# Patient Record
Sex: MALE | Race: WHITE | NOT HISPANIC OR LATINO | Employment: OTHER | URBAN - METROPOLITAN AREA
[De-identification: names, ages, dates, MRNs, and addresses within clinical notes are randomized per-mention and may not be internally consistent; named-entity substitution may affect disease eponyms.]

---

## 2018-11-15 LAB — HCV AB SER-ACNC: NEGATIVE

## 2019-11-20 LAB
CHOLEST SERPL-MSCNC: 86 MG/DL (ref 0–200)
HDLC SERPL-MCNC: 35 MG/DL
LDLC SERPL CALC-MCNC: 34 MG/DL (ref 0–160)
TRIGL SERPL-MCNC: 86 MG/DL

## 2020-11-03 ENCOUNTER — OFFICE VISIT (OUTPATIENT)
Dept: FAMILY MEDICINE | Facility: CLINIC | Age: 70
End: 2020-11-03
Payer: MEDICARE

## 2020-11-03 VITALS
BODY MASS INDEX: 32.74 KG/M2 | DIASTOLIC BLOOD PRESSURE: 74 MMHG | HEIGHT: 73 IN | OXYGEN SATURATION: 97 % | WEIGHT: 247 LBS | HEART RATE: 60 BPM | SYSTOLIC BLOOD PRESSURE: 130 MMHG | RESPIRATION RATE: 18 BRPM | TEMPERATURE: 98 F

## 2020-11-03 DIAGNOSIS — I10 HTN, GOAL BELOW 130/80: ICD-10-CM

## 2020-11-03 DIAGNOSIS — S90.821A BLISTER OF PLANTAR ASPECT OF RIGHT FOOT, INITIAL ENCOUNTER: Primary | ICD-10-CM

## 2020-11-03 PROCEDURE — 99212 PR OFFICE/OUTPT VISIT, EST, LEVL II, 10-19 MIN: ICD-10-PCS | Mod: S$GLB,,, | Performed by: INTERNAL MEDICINE

## 2020-11-03 PROCEDURE — 99212 OFFICE O/P EST SF 10 MIN: CPT | Mod: S$GLB,,, | Performed by: INTERNAL MEDICINE

## 2020-11-03 RX ORDER — LEVOTHYROXINE SODIUM 50 UG/1
TABLET ORAL
COMMUNITY
Start: 2020-08-23 | End: 2020-11-23

## 2020-11-03 RX ORDER — FERROUS SULFATE 325(65) MG
325 TABLET ORAL
COMMUNITY
End: 2022-05-20

## 2020-11-03 RX ORDER — IRBESARTAN 150 MG/1
TABLET ORAL
COMMUNITY
Start: 2020-08-25 | End: 2020-11-23

## 2020-11-03 RX ORDER — PANTOPRAZOLE SODIUM 40 MG/1
TABLET, DELAYED RELEASE ORAL
COMMUNITY
Start: 2020-08-25 | End: 2020-11-23

## 2020-11-03 RX ORDER — ATORVASTATIN CALCIUM 40 MG/1
TABLET, FILM COATED ORAL
COMMUNITY
Start: 2020-08-25 | End: 2020-11-23

## 2020-11-03 RX ORDER — INSULIN DEGLUDEC 200 U/ML
26 INJECTION, SOLUTION SUBCUTANEOUS
COMMUNITY
End: 2020-11-17

## 2020-11-03 RX ORDER — INFLUENZA A VIRUS A/MICHIGAN/45/2015 X-275 (H1N1) ANTIGEN (FORMALDEHYDE INACTIVATED), INFLUENZA A VIRUS A/SINGAPORE/INFIMH-16-0019/2016 IVR-186 (H3N2) ANTIGEN (FORMALDEHYDE INACTIVATED), INFLUENZA B VIRUS B/PHUKET/3073/2013 ANTIGEN (FORMALDEHYDE INACTIVATED), AND INFLUENZA B VIRUS B/MARYLAND/15/2016 BX-69A ANTIGEN (FORMALDEHYDE INACTIVATED) 60; 60; 60; 60 UG/.7ML; UG/.7ML; UG/.7ML; UG/.7ML
INJECTION, SUSPENSION INTRAMUSCULAR
COMMUNITY
Start: 2020-09-17 | End: 2021-03-31

## 2020-11-03 RX ORDER — LORATADINE 10 MG/1
10 TABLET ORAL DAILY
COMMUNITY

## 2020-11-03 RX ORDER — METFORMIN HYDROCHLORIDE 500 MG/1
TABLET, EXTENDED RELEASE ORAL
COMMUNITY
Start: 2020-08-23 | End: 2020-11-23

## 2020-11-03 RX ORDER — AMLODIPINE BESYLATE 5 MG/1
TABLET ORAL
COMMUNITY
Start: 2020-09-15 | End: 2020-12-14

## 2020-11-03 RX ORDER — INSULIN DEGLUDEC 200 U/ML
INJECTION, SOLUTION SUBCUTANEOUS
COMMUNITY
Start: 2020-09-10 | End: 2020-12-01

## 2020-11-03 RX ORDER — GABAPENTIN 300 MG/1
300 CAPSULE ORAL
COMMUNITY
End: 2020-11-30

## 2020-11-03 RX ORDER — CALCIUM CARB/VITAMIN D3/VIT K1 650MG-12.5
1 TABLET,CHEWABLE ORAL
COMMUNITY
End: 2022-05-20

## 2020-11-03 RX ORDER — DAPAGLIFLOZIN AND SAXAGLIPTIN 10; 5 MG/1; MG/1
TABLET, FILM COATED ORAL
COMMUNITY
Start: 2020-09-07 | End: 2020-12-07

## 2020-11-03 NOTE — PROGRESS NOTES
"Subjective:       Patient ID: John Martini is a 70 y.o. male.    Chief Complaint: Follow-up (Blister noted to ball of right foot)    Mr. Martini comes to the office today with a complaint of new:  Blood blister on the Right foot pad between the right 2nd toes area ".  His wife was going to help in trimming his toenails last week and that's when they noticed it.  The area is not painful nor does it appear infected.  There is no surrounding erythema, it is not coming to a punctum, seems to have increased some in size because of positioning, the blood has dissected between the planes and now appears further down on the chronic foot pad callus there.     Follow-up  This is a new problem. The current episode started in the past 7 days. The problem has been gradually worsening. Nothing aggravates the symptoms. He has tried nothing for the symptoms. The treatment provided no relief.     Review of Systems   HENT: Negative for drooling and mouth sores.    Respiratory: Negative for stridor.    Gastrointestinal: Negative for rectal pain.   Genitourinary: Negative for decreased urine volume and enuresis.   Allergic/Immunologic: Negative for frequent infections.   All other systems reviewed and are negative.        Objective:      Physical Exam  Vitals signs and nursing note reviewed.   Musculoskeletal:        Feet:          Assessment:       1. Blister of plantar aspect of right foot, initial encounter    2. HTN, goal below 130/80        Plan:       Pt instructed to wear appropriate shoes and protect the area from trauma or cuts. Watch for signs of infection and contact me asap.  "

## 2020-12-01 ENCOUNTER — OFFICE VISIT (OUTPATIENT)
Dept: FAMILY MEDICINE | Facility: CLINIC | Age: 70
End: 2020-12-01
Payer: MEDICARE

## 2020-12-01 VITALS
DIASTOLIC BLOOD PRESSURE: 76 MMHG | HEART RATE: 64 BPM | SYSTOLIC BLOOD PRESSURE: 136 MMHG | BODY MASS INDEX: 31.83 KG/M2 | TEMPERATURE: 98 F | HEIGHT: 74 IN | WEIGHT: 248 LBS | OXYGEN SATURATION: 97 % | RESPIRATION RATE: 18 BRPM

## 2020-12-01 DIAGNOSIS — I10 ESSENTIAL HYPERTENSION: ICD-10-CM

## 2020-12-01 DIAGNOSIS — E11.621 DIABETIC ULCER OF OTHER PART OF RIGHT FOOT ASSOCIATED WITH TYPE 2 DIABETES MELLITUS, WITH FAT LAYER EXPOSED: Primary | ICD-10-CM

## 2020-12-01 DIAGNOSIS — L97.512 DIABETIC ULCER OF OTHER PART OF RIGHT FOOT ASSOCIATED WITH TYPE 2 DIABETES MELLITUS, WITH FAT LAYER EXPOSED: Primary | ICD-10-CM

## 2020-12-01 PROCEDURE — 99214 OFFICE O/P EST MOD 30 MIN: CPT | Mod: 25,S$GLB,, | Performed by: INTERNAL MEDICINE

## 2020-12-01 PROCEDURE — 90471 TDAP VACCINE GREATER THAN OR EQUAL TO 7YO IM: ICD-10-PCS | Mod: S$GLB,,, | Performed by: INTERNAL MEDICINE

## 2020-12-01 PROCEDURE — 90715 TDAP VACCINE GREATER THAN OR EQUAL TO 7YO IM: ICD-10-PCS | Mod: S$GLB,,, | Performed by: INTERNAL MEDICINE

## 2020-12-01 PROCEDURE — 99214 PR OFFICE/OUTPT VISIT, EST, LEVL IV, 30-39 MIN: ICD-10-PCS | Mod: 25,S$GLB,, | Performed by: INTERNAL MEDICINE

## 2020-12-01 PROCEDURE — 90471 IMMUNIZATION ADMIN: CPT | Mod: S$GLB,,, | Performed by: INTERNAL MEDICINE

## 2020-12-01 PROCEDURE — 90715 TDAP VACCINE 7 YRS/> IM: CPT | Mod: S$GLB,,, | Performed by: INTERNAL MEDICINE

## 2020-12-01 RX ORDER — LEVOTHYROXINE SODIUM 50 UG/1
50 TABLET ORAL
COMMUNITY
Start: 2020-08-23 | End: 2021-10-26

## 2020-12-01 RX ORDER — LOSARTAN POTASSIUM AND HYDROCHLOROTHIAZIDE 12.5; 1 MG/1; MG/1
TABLET ORAL
COMMUNITY
End: 2022-04-27

## 2020-12-01 RX ORDER — BLOOD-GLUCOSE METER
EACH MISCELLANEOUS
COMMUNITY
Start: 2020-11-02 | End: 2021-09-20

## 2020-12-01 RX ORDER — AMOXICILLIN AND CLAVULANATE POTASSIUM 875; 125 MG/1; MG/1
1 TABLET, FILM COATED ORAL 2 TIMES DAILY
Qty: 10 TABLET | Refills: 0 | Status: SHIPPED | OUTPATIENT
Start: 2020-12-01 | End: 2021-03-31

## 2020-12-01 NOTE — PROGRESS NOTES
Subjective:       Patient ID: John Martini is a 70 y.o. male.    Chief Complaint: Follow-up (blister on right foot)    Patient is here today for follow-up of the blister on the front side of his right foot.  He has spontaneously started draining and he has developed a fetid smell they noticed this on on November 29, 2020.  There has been no fever chills nausea vomiting diarrhea or any other constitutional symptoms associated to these.  There is no red streak going up the foot.  There had been no recent steroid therapy nor antibiotic therapy either.  The patient did not try cutting into the lesion is spontaneously started draining.  At the beginning it appeared like a bruise and then he has started setting the callus like skin and he has a slight opening to wait in longitudinal fashion following the long axis of the foot.  Is mildly painful.  It is not affecting his sleep.  My assessment at this time if the patient warrants wound care evaluation and therapy and he agrees to be referred to the approximate care unit in [facility].    His BOOSTRIX will be administered now.    BP is to goal.    Review of Systems   HENT: Negative for drooling and mouth sores. Trouble swallowing: augm.    Respiratory: Negative for stridor.    Gastrointestinal: Negative for rectal pain.   Genitourinary: Negative for decreased urine volume and enuresis.   Allergic/Immunologic: Negative for frequent infections.   All other systems reviewed and are negative.        Objective:      Physical Exam  Constitutional:       Appearance: Normal appearance. He is obese.   HENT:      Head: Normocephalic and atraumatic.      Right Ear: Tympanic membrane, ear canal and external ear normal. There is no impacted cerumen.      Left Ear: Tympanic membrane, ear canal and external ear normal. There is no impacted cerumen.      Nose: No congestion or rhinorrhea.      Mouth/Throat:      Mouth: Mucous membranes are moist.      Pharynx: No  oropharyngeal exudate or posterior oropharyngeal erythema.   Eyes:      General: No scleral icterus.        Right eye: No discharge.         Left eye: No discharge.      Extraocular Movements: Extraocular movements intact.      Conjunctiva/sclera: Conjunctivae normal.      Pupils: Pupils are equal, round, and reactive to light.   Neck:      Musculoskeletal: Normal range of motion and neck supple. No neck rigidity or muscular tenderness.      Vascular: No carotid bruit.   Cardiovascular:      Rate and Rhythm: Normal rate and regular rhythm.      Pulses: Normal pulses.      Heart sounds: Normal heart sounds. No murmur. No friction rub. No gallop.    Pulmonary:      Effort: Pulmonary effort is normal. No respiratory distress.      Breath sounds: Normal breath sounds. No stridor. No wheezing, rhonchi or rales.   Chest:      Chest wall: No tenderness.   Abdominal:      General: Abdomen is flat. Bowel sounds are normal. There is no distension.      Palpations: Abdomen is soft. There is no mass.      Tenderness: There is no abdominal tenderness. There is no right CVA tenderness, left CVA tenderness, guarding or rebound.      Hernia: No hernia is present.   Genitourinary:     Penis: Normal.       Scrotum/Testes: Normal.      Prostate: Normal.      Rectum: Normal. Guaiac result negative.   Musculoskeletal: Normal range of motion.         General: No swelling, tenderness, deformity or signs of injury.      Right lower leg: No edema.      Left lower leg: No edema.        Feet:    Feet:      Comments: Area affected. PLEASE SEE PHOTO OF THE AFFECTED AREA INCLUDED.  Lymphadenopathy:      Cervical: No cervical adenopathy.   Skin:     General: Skin is warm and dry.      Capillary Refill: Capillary refill takes 2 to 3 seconds.      Coloration: Skin is not jaundiced or pale.      Findings: No bruising, erythema, lesion or rash.   Neurological:      General: No focal deficit present.      Mental Status: He is alert and oriented to  person, place, and time. Mental status is at baseline.      Cranial Nerves: No cranial nerve deficit.      Sensory: No sensory deficit.      Motor: No weakness.      Coordination: Coordination normal.      Gait: Gait normal.      Deep Tendon Reflexes: Reflexes normal.   Psychiatric:         Mood and Affect: Mood normal.         Behavior: Behavior normal.         Thought Content: Thought content normal.         Judgment: Judgment normal.         Assessment:       1. 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 Essential hypertension        Plan:   Patient will be referred to the [place] wound care clinic.  A copy of the chart will be available for the review with the medications prescribed etc. But also a picture of the affected area has been included patient should be seen in the next 72 hr.    The patient will receive a dose of TDAP today prior to leaving office.      If there is any questions with regard to his care please feel free to contact me at [phone] thank you

## 2020-12-07 ENCOUNTER — OFFICE VISIT (OUTPATIENT)
Dept: WOUND CARE | Facility: HOSPITAL | Age: 70
End: 2020-12-07
Attending: FAMILY MEDICINE
Payer: MEDICARE

## 2020-12-07 VITALS — SYSTOLIC BLOOD PRESSURE: 145 MMHG | DIASTOLIC BLOOD PRESSURE: 77 MMHG | TEMPERATURE: 98 F | HEART RATE: 58 BPM

## 2020-12-07 DIAGNOSIS — L97.419: Primary | ICD-10-CM

## 2020-12-07 PROCEDURE — 11042 PR DEBRIDEMENT, SKIN, SUB-Q TISSUE,=<20 SQ CM: ICD-10-PCS | Mod: ,,, | Performed by: FAMILY MEDICINE

## 2020-12-07 PROCEDURE — 99203 PR OFFICE/OUTPT VISIT, NEW, LEVL III, 30-44 MIN: ICD-10-PCS | Mod: 25,,, | Performed by: FAMILY MEDICINE

## 2020-12-07 PROCEDURE — 99213 OFFICE O/P EST LOW 20 MIN: CPT | Performed by: FAMILY MEDICINE

## 2020-12-07 PROCEDURE — 11042 DBRDMT SUBQ TIS 1ST 20SQCM/<: CPT | Mod: ,,, | Performed by: FAMILY MEDICINE

## 2020-12-07 PROCEDURE — 99203 OFFICE O/P NEW LOW 30 MIN: CPT | Mod: 25,,, | Performed by: FAMILY MEDICINE

## 2020-12-07 NOTE — PROGRESS NOTES
SUBJECTIVE:    Patient ID: John Martini is a 70 y.o. male.    Chief Complaint: Wound Care    HPI    John Martini is a 70 year old male with diabetes who presents with a complaint of right foot ulcer. Patient states he 1st appreciated the wound on 11/29 however, likely has been there longer.  Current symptoms include thickened skin.  Aggravating factors are ambulation. Symptoms have progressively worsened.Treatment to date have included Neosporin ointment. Patients rates 0/10.    12/7:  Pleasant male presents today for 1st wound care evaluation.  He has a right plantar ulcer that he appreciated November 29th.  The lesion has likely been there longer.  He denies any pain with palpation.  Moderate amount of callus shaved down.  Debridement of skin afterwards for removal of devitalized tissue.  Patient's last A1c per patient about 3 months ago was 8%.  He does have pending A1c.  Had discussion with patient regarding need for offloading of the area, wearing shoes in his home, checking his feet daily, carbohydrate control.  All questions answered.  He is established with Podiatry, last visit about a year ago. Strongly encouraged follow up.  Follow-up 1 week    Past Medical History:   Diagnosis Date    Allergy     Diabetes mellitus, type 2     GERD (gastroesophageal reflux disease)     Hyperlipidemia     Hypertension     Hyperthyroidism      Past Surgical History:   Procedure Laterality Date    COLONOSCOPY      GASTRIC BYPASS N/A      Family History   Problem Relation Age of Onset    Hyperlipidemia Mother     Stroke Mother     Heart disease Father        Marital Status:   Alcohol History:  has no history on file for alcohol.  Tobacco History:  has no history on file for tobacco.  Drug History:  has no history on file for drug.    Review of patient's allergies indicates:  No Known Allergies    Current Outpatient Medications:     amLODIPine (NORVASC) 5 MG tablet, , Disp: , Rfl:      amoxicillin-clavulanate 875-125mg (AUGMENTIN) 875-125 mg per tablet, Take 1 tablet by mouth 2 (two) times daily., Disp: 10 tablet, Rfl: 0    atorvastatin (LIPITOR) 40 MG tablet, TAKE 1 TABLET AT BEDTIME, Disp: 90 tablet, Rfl: 3    calcium-vitamin D3-vitamin K (VIACTIV) 650 mg-12.5 mcg-40 mcg Chew, Take 1 tablet by mouth., Disp: , Rfl:     ferrous sulfate (FEOSOL) 325 mg (65 mg iron) Tab tablet, Take 325 mg by mouth., Disp: , Rfl:     FLUZONE HIGHDOSE QUAD 20-21  mcg/0.7 mL Syrg, PHARMACY ADMINISTERED, Disp: , Rfl:     gabapentin (NEURONTIN) 300 MG capsule, TAKE 1 CAPSULE IN THE MORNING AND 2 CAPSULES AT BEDTIME, Disp: 270 capsule, Rfl: 3    irbesartan (AVAPRO) 150 MG tablet, TAKE 1 TABLET DAILY, Disp: 90 tablet, Rfl: 3    levothyroxine (SYNTHROID) 50 MCG tablet, Take 50 mcg by mouth., Disp: , Rfl:     loratadine (CLARITIN) 10 mg tablet, Take 10 mg by mouth once daily., Disp: , Rfl:     losartan-hydrochlorothiazide 100-12.5 mg (HYZAAR) 100-12.5 mg Tab, losartan 100 mg-hydrochlorothiazide 12.5 mg tablet  Take 1 tablet every day by oral route for 90 days., Disp: , Rfl:     metFORMIN (GLUCOPHAGE-XR) 500 MG ER 24hr tablet, TAKE 3 TABLETS EVERY EVENING, Disp: 270 tablet, Rfl: 3    multivitamin (MULTIPLE VITAMINS DAILY ORAL), Take by mouth., Disp: , Rfl:     ONETOUCH VERIO TEST STRIPS Strp, USE 1 STRIP VIA METER TWICE A DAY AS DIRECTED, Disp: , Rfl:     pantoprazole (PROTONIX) 40 MG tablet, TAKE 1 TABLET DAILY, Disp: 90 tablet, Rfl: 3    QTERN 10-5 mg Tab, , Disp: , Rfl:     TRESIBA FLEXTOUCH U-200 200 unit/mL (3 mL) InPn, INJECT 26 UNITS UNDER THE SKIN AT BEDTIME, Disp: 18 mL, Rfl: 2    Review of Systems   Constitutional: Negative for activity change, appetite change, chills, fatigue and fever.   Respiratory: Negative for shortness of breath.    Cardiovascular: Negative for chest pain.   Skin: Positive for wound.   Psychiatric/Behavioral: Negative for agitation and behavioral problems.        Physical  Exam   Constitutional: He is oriented to person, place, and time and well-developed, well-nourished, and in no distress. No distress.   HENT:   Head: Normocephalic and atraumatic.   Eyes: EOM are normal.   Pulmonary/Chest: Effort normal. No respiratory distress.   Neurological: He is alert and oriented to person, place, and time.   Skin: Lesion noted. He is not diaphoretic.        Psychiatric: Affect and judgment normal.        Assessment:       1. Ulcer of midfoot, right, with unspecified severity         Plan:       Ulcer of midfoot, right, with unspecified severity      Follow up in about 1 week (around 12/14/2020).           Counseling:      I provided patient education verbally regarding:   Patient diagnosis, treatment options, as well as alternatives, risks, and benefits.       Much of the documentation for this visit was completed in the Wound Docs system.  Please see the attached documentation for further details about the patient's care. Scanned under the Media tab.      Kateryna Love MD

## 2020-12-14 ENCOUNTER — OFFICE VISIT (OUTPATIENT)
Dept: WOUND CARE | Facility: HOSPITAL | Age: 70
End: 2020-12-14
Attending: FAMILY MEDICINE
Payer: MEDICARE

## 2020-12-14 VITALS
DIASTOLIC BLOOD PRESSURE: 95 MMHG | TEMPERATURE: 98 F | SYSTOLIC BLOOD PRESSURE: 136 MMHG | HEART RATE: 67 BPM | RESPIRATION RATE: 18 BRPM

## 2020-12-14 DIAGNOSIS — E11.621 DIABETIC ULCER OF OTHER PART OF RIGHT FOOT ASSOCIATED WITH TYPE 2 DIABETES MELLITUS, WITH FAT LAYER EXPOSED: ICD-10-CM

## 2020-12-14 DIAGNOSIS — L97.512 DIABETIC ULCER OF OTHER PART OF RIGHT FOOT ASSOCIATED WITH TYPE 2 DIABETES MELLITUS, WITH FAT LAYER EXPOSED: ICD-10-CM

## 2020-12-14 DIAGNOSIS — L97.419: Primary | ICD-10-CM

## 2020-12-14 PROCEDURE — 11042 PR DEBRIDEMENT, SKIN, SUB-Q TISSUE,=<20 SQ CM: ICD-10-PCS | Mod: ,,, | Performed by: FAMILY MEDICINE

## 2020-12-14 PROCEDURE — 11042 DBRDMT SUBQ TIS 1ST 20SQCM/<: CPT | Performed by: FAMILY MEDICINE

## 2020-12-14 PROCEDURE — 11042 DBRDMT SUBQ TIS 1ST 20SQCM/<: CPT | Mod: ,,, | Performed by: FAMILY MEDICINE

## 2020-12-14 RX ORDER — AMLODIPINE BESYLATE 5 MG/1
TABLET ORAL
Qty: 90 TABLET | Refills: 3 | Status: SHIPPED | OUTPATIENT
Start: 2020-12-14 | End: 2021-11-16

## 2020-12-14 NOTE — PROGRESS NOTES
SUBJECTIVE:    Patient ID: John Martini is a 70 y.o. male.    Chief Complaint: Wound Care    HPI    John Martini is a 70 year old male with diabetes who presents with a complaint of right foot ulcer. Patient states he 1st appreciated the wound on 11/29 however, likely has been there longer.  Current symptoms include thickened skin.  Aggravating factors are ambulation. Symptoms have progressively worsened.Treatment to date have included Neosporin ointment. Patients rates 0/10.     12/7:  Pleasant male presents today for 1st wound care evaluation.  He has a right plantar ulcer that he appreciated November 29th.  The lesion has likely been there longer.  He denies any pain with palpation.  Moderate amount of callus shaved down.  Debridement of skin afterwards for removal of devitalized tissue.  Patient's last A1c per patient about 3 months ago was 8%.  He does have pending A1c.  Had discussion with patient regarding need for offloading of the area, wearing shoes in his home, checking his feet daily, carbohydrate control.  All questions answered.  He is established with Podiatry, last visit about a year ago. Strongly encouraged follow up.  Follow-up 1 week    12/14:  Patient presents for wound care follow-up.  Since last visit he has established with Podiatry.  Will receive shoe inserts and had callus shaved down again.  Today, callus shaved down.  Small area debrided for removal of devitalized tissue.  He will continue follow-up with Podiatry.  Encouraged offloading and wearing shoe inserts as prescribed.  All of his questions were answered today.  He may follow-up as needed.    Past Medical History:   Diagnosis Date    Allergy     Diabetes mellitus, type 2     GERD (gastroesophageal reflux disease)     Hyperlipidemia     Hypertension     Hyperthyroidism      Past Surgical History:   Procedure Laterality Date    COLONOSCOPY      GASTRIC BYPASS N/A      Family History   Problem Relation Age of Onset     Hyperlipidemia Mother     Stroke Mother     Heart disease Father        Marital Status:   Alcohol History:  has no history on file for alcohol.  Tobacco History:  has no history on file for tobacco.  Drug History:  has no history on file for drug.    Review of patient's allergies indicates:  No Known Allergies    Current Outpatient Medications:     amLODIPine (NORVASC) 5 MG tablet, TAKE 1 TABLET DAILY, Disp: 90 tablet, Rfl: 3    amoxicillin-clavulanate 875-125mg (AUGMENTIN) 875-125 mg per tablet, Take 1 tablet by mouth 2 (two) times daily., Disp: 10 tablet, Rfl: 0    atorvastatin (LIPITOR) 40 MG tablet, TAKE 1 TABLET AT BEDTIME, Disp: 90 tablet, Rfl: 3    calcium-vitamin D3-vitamin K (VIACTIV) 650 mg-12.5 mcg-40 mcg Chew, Take 1 tablet by mouth., Disp: , Rfl:     ferrous sulfate (FEOSOL) 325 mg (65 mg iron) Tab tablet, Take 325 mg by mouth., Disp: , Rfl:     FLUZONE HIGHDOSE QUAD 20-21  mcg/0.7 mL Syrg, PHARMACY ADMINISTERED, Disp: , Rfl:     gabapentin (NEURONTIN) 300 MG capsule, TAKE 1 CAPSULE IN THE MORNING AND 2 CAPSULES AT BEDTIME, Disp: 270 capsule, Rfl: 3    irbesartan (AVAPRO) 150 MG tablet, TAKE 1 TABLET DAILY, Disp: 90 tablet, Rfl: 3    levothyroxine (SYNTHROID) 50 MCG tablet, Take 50 mcg by mouth., Disp: , Rfl:     loratadine (CLARITIN) 10 mg tablet, Take 10 mg by mouth once daily., Disp: , Rfl:     losartan-hydrochlorothiazide 100-12.5 mg (HYZAAR) 100-12.5 mg Tab, losartan 100 mg-hydrochlorothiazide 12.5 mg tablet  Take 1 tablet every day by oral route for 90 days., Disp: , Rfl:     metFORMIN (GLUCOPHAGE-XR) 500 MG ER 24hr tablet, TAKE 3 TABLETS EVERY EVENING, Disp: 270 tablet, Rfl: 3    multivitamin (MULTIPLE VITAMINS DAILY ORAL), Take by mouth., Disp: , Rfl:     ONETOUCH VERIO TEST STRIPS Strp, USE 1 STRIP VIA METER TWICE A DAY AS DIRECTED, Disp: , Rfl:     pantoprazole (PROTONIX) 40 MG tablet, TAKE 1 TABLET DAILY, Disp: 90 tablet, Rfl: 3    QTERN 10-5 mg Tab, TAKE 1  TABLET ONCE DAILY, Disp: 90 tablet, Rfl: 3    TRESIBA FLEXTOUCH U-200 200 unit/mL (3 mL) InPn, INJECT 26 UNITS UNDER THE SKIN AT BEDTIME, Disp: 18 mL, Rfl: 2    Review of Systems     Physical Exam   Constitutional: He is oriented to person, place, and time and well-developed, well-nourished, and in no distress. No distress.   HENT:   Head: Normocephalic and atraumatic.   Eyes: EOM are normal.   Pulmonary/Chest: Effort normal. No respiratory distress.   Neurological: He is alert and oriented to person, place, and time.   Skin: Lesion noted. He is not diaphoretic.        Psychiatric: Affect and judgment normal.        Assessment:       1. Ulcer of midfoot, right, with unspecified severity         Plan:       Ulcer of midfoot, right, with unspecified severity      Follow up if symptoms worsen or fail to improve.           Counseling:      I provided patient education verbally regarding:   Patient diagnosis, treatment options, as well as alternatives, risks, and benefits.       Much of the documentation for this visit was completed in the Wound Docs system.  Please see the attached documentation for further details about the patient's care. Scanned under the Media tab.      Kateryna Love MD

## 2020-12-14 NOTE — TELEPHONE ENCOUNTER
Received message to call patient back regarding his diabetic shoes.  Paperwork filled out by Dr. Lawson. Faxed per Dr. Lawson. Patient notified that paperwork had been faxed. Patient verbalized understanding. GILES Ellsworth RN

## 2020-12-14 NOTE — TELEPHONE ENCOUNTER
----- Message from Sherin Strauss, Patient Care Assistant sent at 12/11/2020  1:45 PM CST -----  Regarding: advice  Contact: pt  Type: Needs Medical Advice  Who Called:  pt   Best Call Back Number: [phone] (home) or [phone]  Additional Information: pt states would like a callback regarding his diabetic shoes order. Thanks!

## 2020-12-16 ENCOUNTER — TELEPHONE (OUTPATIENT)
Dept: FAMILY MEDICINE | Facility: CLINIC | Age: 70
End: 2020-12-16

## 2020-12-16 NOTE — TELEPHONE ENCOUNTER
Spoke with Lara in [facility].  States patient was seen 12/7 as well as 12/14.  Informed making sure patient was seen and orders addressed as Basket Message was not addressed.

## 2020-12-16 NOTE — TELEPHONE ENCOUNTER
----- Message from Sha Mcgarry sent at 12/4/2020  8:33 AM CST -----  Contact: Lara from [facility] [phone]  Type: Needs Medical Advice  Who Called:  Lara Ordoñez Call Back Number: [phone]  Additional Information: Lara from [facility] is calling to let you know the pt has been schedule for 12/7/2020 for 9AM. Please call back and advise

## 2020-12-29 ENCOUNTER — APPOINTMENT (OUTPATIENT)
Dept: LAB | Facility: HOSPITAL | Age: 70
End: 2020-12-29
Attending: INTERNAL MEDICINE
Payer: MEDICARE

## 2020-12-29 ENCOUNTER — LAB VISIT (OUTPATIENT)
Dept: FAMILY MEDICINE | Facility: CLINIC | Age: 70
End: 2020-12-29
Payer: MEDICARE

## 2020-12-29 DIAGNOSIS — E03.9 MYXEDEMA HEART DISEASE: ICD-10-CM

## 2020-12-29 DIAGNOSIS — E11.9 DIABETES MELLITUS WITHOUT COMPLICATION: Primary | ICD-10-CM

## 2020-12-29 DIAGNOSIS — I51.9 MYXEDEMA HEART DISEASE: ICD-10-CM

## 2020-12-29 DIAGNOSIS — Z79.899 ENCOUNTER FOR LONG-TERM (CURRENT) USE OF OTHER MEDICATIONS: ICD-10-CM

## 2020-12-29 LAB
ESTIMATED AVG GLUCOSE: 171 MG/DL (ref 68–131)
HBA1C MFR BLD HPLC: 7.6 % (ref 4–5.6)
T4 FREE SERPL-MCNC: 0.85 NG/DL (ref 0.71–1.51)
TSH SERPL DL<=0.005 MIU/L-ACNC: 5.07 UIU/ML (ref 0.4–4)

## 2020-12-29 PROCEDURE — 83036 HEMOGLOBIN GLYCOSYLATED A1C: CPT

## 2020-12-29 PROCEDURE — 84443 ASSAY THYROID STIM HORMONE: CPT

## 2020-12-29 PROCEDURE — 84439 ASSAY OF FREE THYROXINE: CPT

## 2020-12-31 ENCOUNTER — OFFICE VISIT (OUTPATIENT)
Dept: FAMILY MEDICINE | Facility: CLINIC | Age: 70
End: 2020-12-31
Payer: MEDICARE

## 2020-12-31 VITALS
HEART RATE: 65 BPM | TEMPERATURE: 98 F | HEIGHT: 73 IN | OXYGEN SATURATION: 97 % | RESPIRATION RATE: 18 BRPM | WEIGHT: 240 LBS | BODY MASS INDEX: 31.81 KG/M2 | DIASTOLIC BLOOD PRESSURE: 74 MMHG | SYSTOLIC BLOOD PRESSURE: 134 MMHG

## 2020-12-31 DIAGNOSIS — L97.419 DIABETIC ULCER OF RIGHT MIDFOOT ASSOCIATED WITH DIABETES MELLITUS DUE TO UNDERLYING CONDITION, UNSPECIFIED ULCER STAGE: ICD-10-CM

## 2020-12-31 DIAGNOSIS — E08.621 DIABETIC ULCER OF RIGHT MIDFOOT ASSOCIATED WITH DIABETES MELLITUS DUE TO UNDERLYING CONDITION, UNSPECIFIED ULCER STAGE: ICD-10-CM

## 2020-12-31 DIAGNOSIS — E11.9 DIABETES MELLITUS WITHOUT COMPLICATION: Primary | ICD-10-CM

## 2020-12-31 DIAGNOSIS — I10 ESSENTIAL HYPERTENSION: ICD-10-CM

## 2020-12-31 PROCEDURE — 99213 PR OFFICE/OUTPT VISIT, EST, LEVL III, 20-29 MIN: ICD-10-PCS | Mod: S$GLB,,, | Performed by: INTERNAL MEDICINE

## 2020-12-31 PROCEDURE — 99213 OFFICE O/P EST LOW 20 MIN: CPT | Mod: S$GLB,,, | Performed by: INTERNAL MEDICINE

## 2020-12-31 RX ORDER — MUPIROCIN 20 MG/G
OINTMENT TOPICAL
COMMUNITY
Start: 2020-12-11 | End: 2022-05-20

## 2020-12-31 RX ORDER — ASPIRIN 81 MG/1
TABLET ORAL
COMMUNITY
Start: 2020-12-28 | End: 2022-05-20

## 2020-12-31 NOTE — PROGRESS NOTES
Subjective:       Patient ID: John Martini is a 70 y.o. male.    Chief Complaint: Follow-up (one month)    Patient is here for follow-up of care for his right foot diabetic foot ulcer.  He was seen by wound care center in [place] and then has been following up lately with Dr. Blackwood the podiatrist in [facility].  The wound is completely healed as per patient's description.    Glucose has not been to goal, the fasting average is about 130 three and he has not been performing 2 hr postprandial glucose capillary blood glucose measurements.    On laboratory evaluation carried out at this facility on December 29th his TSH is slightly elevated at 5.069 on his hemoglobin A1c was 7.6 which is improved from prior.  This gives in average glucose of 168 over the last 60 days.  Our goal is to have the hemoglobin A1c as low as possible while at the same time avoiding hypoglycemic reactions.    Patient does have testing supplies at home.    On medication review he does not need any refills at this time.  He does mention that the acute turn will have to be changed to the split separate drug classes due to coverage.        Review of Systems   HENT: Negative for drooling and mouth sores.    Respiratory: Negative for stridor.    Gastrointestinal: Negative for rectal pain.   Genitourinary: Negative for decreased urine volume and enuresis.   Allergic/Immunologic: Negative for frequent infections.   All other systems reviewed and are negative.        Objective:      Physical Exam  Vitals signs and nursing note reviewed.   Constitutional:       Appearance: Normal appearance. He is obese.   HENT:      Head: Normocephalic and atraumatic.      Right Ear: Tympanic membrane, ear canal and external ear normal. There is no impacted cerumen.      Left Ear: Tympanic membrane, ear canal and external ear normal. There is no impacted cerumen.      Nose: No congestion or rhinorrhea.      Mouth/Throat:      Mouth: Mucous membranes are moist.       Pharynx: No oropharyngeal exudate or posterior oropharyngeal erythema.   Eyes:      General: No scleral icterus.        Right eye: No discharge.         Left eye: No discharge.      Extraocular Movements: Extraocular movements intact.      Conjunctiva/sclera: Conjunctivae normal.      Pupils: Pupils are equal, round, and reactive to light.   Neck:      Musculoskeletal: Normal range of motion and neck supple. No neck rigidity or muscular tenderness.      Vascular: No carotid bruit.   Cardiovascular:      Rate and Rhythm: Normal rate and regular rhythm.      Pulses: Normal pulses.      Heart sounds: Normal heart sounds. No murmur. No friction rub. No gallop.    Pulmonary:      Effort: Pulmonary effort is normal. No respiratory distress.      Breath sounds: Normal breath sounds. No stridor. No wheezing, rhonchi or rales.   Chest:      Chest wall: No tenderness.   Abdominal:      General: Abdomen is flat. Bowel sounds are normal. There is no distension.      Palpations: Abdomen is soft. There is no mass.      Tenderness: There is no abdominal tenderness. There is no right CVA tenderness, left CVA tenderness, guarding or rebound.      Hernia: No hernia is present.   Genitourinary:     Penis: Normal.       Scrotum/Testes: Normal.      Prostate: Normal.      Rectum: Normal. Guaiac result negative.   Musculoskeletal: Normal range of motion.         General: No swelling, tenderness, deformity or signs of injury.      Right lower leg: No edema.      Left lower leg: No edema.   Lymphadenopathy:      Cervical: No cervical adenopathy.   Skin:     General: Skin is warm and dry.      Capillary Refill: Capillary refill takes 2 to 3 seconds.      Coloration: Skin is not jaundiced or pale.      Findings: No bruising, erythema, lesion or rash.   Neurological:      General: No focal deficit present.      Mental Status: He is alert and oriented to person, place, and time. Mental status is at baseline.      Cranial Nerves: No cranial  nerve deficit.      Sensory: No sensory deficit.      Motor: No weakness.      Coordination: Coordination normal.      Gait: Gait normal.      Deep Tendon Reflexes: Reflexes normal.   Psychiatric:         Mood and Affect: Mood normal.         Behavior: Behavior normal.         Thought Content: Thought content normal.         Judgment: Judgment normal.         Assessment:       1. Diabetes mellitus without complication    2. Essential hypertension    3. Diabetic ulcer of right midfoot associated with diabetes mellitus due to underlying condition, unspecified ulcer stage        Plan:       Plans are for patient to continue same diabetic management with continued decrease in the hemoglobin A1c the closer to a 7-6.8 the better.  He has all the medications and supplies or testing at home.  His diabetic foot ulcer is healed per his description is to continue follow-up with Dr. Blackwood as schedule.  Hemoglobin A1c will be carried out here March 30th with a follow-up appointment March 31, 2021.  Patient is interested in getting the coronavirus vaccine once is available to the general population.  Is well aware of the signs and symptoms to watch for and to seek attention in case these appear

## 2021-01-04 ENCOUNTER — PATIENT MESSAGE (OUTPATIENT)
Dept: ADMINISTRATIVE | Facility: HOSPITAL | Age: 71
End: 2021-01-04

## 2021-01-14 DIAGNOSIS — E11.9 TYPE 2 DIABETES MELLITUS WITHOUT COMPLICATION: ICD-10-CM

## 2021-01-22 ENCOUNTER — PATIENT MESSAGE (OUTPATIENT)
Dept: ADMINISTRATIVE | Facility: OTHER | Age: 71
End: 2021-01-22

## 2021-02-01 ENCOUNTER — OFFICE VISIT (OUTPATIENT)
Dept: FAMILY MEDICINE | Facility: CLINIC | Age: 71
End: 2021-02-01
Payer: MEDICARE

## 2021-02-01 VITALS
HEIGHT: 73 IN | WEIGHT: 246 LBS | TEMPERATURE: 97 F | DIASTOLIC BLOOD PRESSURE: 68 MMHG | SYSTOLIC BLOOD PRESSURE: 120 MMHG | RESPIRATION RATE: 16 BRPM | OXYGEN SATURATION: 97 % | HEART RATE: 55 BPM | BODY MASS INDEX: 32.6 KG/M2

## 2021-02-01 DIAGNOSIS — R00.1 BRADYCARDIA: ICD-10-CM

## 2021-02-01 DIAGNOSIS — E11.9 DIABETES MELLITUS WITHOUT COMPLICATION: Primary | ICD-10-CM

## 2021-02-01 PROCEDURE — 99212 OFFICE O/P EST SF 10 MIN: CPT | Mod: S$GLB,,, | Performed by: INTERNAL MEDICINE

## 2021-02-01 PROCEDURE — 99212 PR OFFICE/OUTPT VISIT, EST, LEVL II, 10-19 MIN: ICD-10-PCS | Mod: S$GLB,,, | Performed by: INTERNAL MEDICINE

## 2021-02-01 RX ORDER — AMMONIUM LACTATE 12 G/100G
LOTION TOPICAL
COMMUNITY
Start: 2021-01-31 | End: 2022-05-20

## 2021-02-01 RX ORDER — EMPAGLIFLOZIN AND LINAGLIPTIN 25; 5 MG/1; MG/1
TABLET, FILM COATED ORAL
Qty: 90 TABLET | Refills: 3 | Status: SHIPPED | OUTPATIENT
Start: 2021-02-01 | End: 2022-01-10

## 2021-02-11 ENCOUNTER — IMMUNIZATION (OUTPATIENT)
Dept: PRIMARY CARE CLINIC | Facility: CLINIC | Age: 71
End: 2021-02-11
Payer: MEDICARE

## 2021-02-11 DIAGNOSIS — Z23 NEED FOR VACCINATION: Primary | ICD-10-CM

## 2021-02-11 PROCEDURE — 91300 COVID-19, MRNA, LNP-S, PF, 30 MCG/0.3 ML DOSE VACCINE: ICD-10-PCS | Mod: S$GLB,,, | Performed by: FAMILY MEDICINE

## 2021-02-11 PROCEDURE — 91300 COVID-19, MRNA, LNP-S, PF, 30 MCG/0.3 ML DOSE VACCINE: CPT | Mod: S$GLB,,, | Performed by: FAMILY MEDICINE

## 2021-02-11 PROCEDURE — 0001A COVID-19, MRNA, LNP-S, PF, 30 MCG/0.3 ML DOSE VACCINE: CPT | Mod: S$GLB,,, | Performed by: FAMILY MEDICINE

## 2021-02-11 PROCEDURE — 0001A COVID-19, MRNA, LNP-S, PF, 30 MCG/0.3 ML DOSE VACCINE: ICD-10-PCS | Mod: S$GLB,,, | Performed by: FAMILY MEDICINE

## 2021-02-18 LAB
LEFT EYE DM RETINOPATHY: POSITIVE
RIGHT EYE DM RETINOPATHY: POSITIVE

## 2021-03-04 ENCOUNTER — IMMUNIZATION (OUTPATIENT)
Dept: PRIMARY CARE CLINIC | Facility: CLINIC | Age: 71
End: 2021-03-04
Payer: MEDICARE

## 2021-03-04 ENCOUNTER — PATIENT OUTREACH (OUTPATIENT)
Dept: ADMINISTRATIVE | Facility: HOSPITAL | Age: 71
End: 2021-03-04

## 2021-03-04 DIAGNOSIS — Z23 NEED FOR VACCINATION: Primary | ICD-10-CM

## 2021-03-04 PROCEDURE — 91300 COVID-19, MRNA, LNP-S, PF, 30 MCG/0.3 ML DOSE VACCINE: ICD-10-PCS | Mod: S$GLB,,, | Performed by: FAMILY MEDICINE

## 2021-03-04 PROCEDURE — 0002A COVID-19, MRNA, LNP-S, PF, 30 MCG/0.3 ML DOSE VACCINE: CPT | Mod: CV19,S$GLB,, | Performed by: FAMILY MEDICINE

## 2021-03-04 PROCEDURE — 0002A COVID-19, MRNA, LNP-S, PF, 30 MCG/0.3 ML DOSE VACCINE: ICD-10-PCS | Mod: CV19,S$GLB,, | Performed by: FAMILY MEDICINE

## 2021-03-04 PROCEDURE — 91300 COVID-19, MRNA, LNP-S, PF, 30 MCG/0.3 ML DOSE VACCINE: CPT | Mod: S$GLB,,, | Performed by: FAMILY MEDICINE

## 2021-03-08 ENCOUNTER — PATIENT OUTREACH (OUTPATIENT)
Dept: ADMINISTRATIVE | Facility: HOSPITAL | Age: 71
End: 2021-03-08

## 2021-03-10 ENCOUNTER — PATIENT OUTREACH (OUTPATIENT)
Dept: ADMINISTRATIVE | Facility: HOSPITAL | Age: 71
End: 2021-03-10

## 2021-03-30 ENCOUNTER — APPOINTMENT (OUTPATIENT)
Dept: LAB | Facility: HOSPITAL | Age: 71
End: 2021-03-30
Attending: INTERNAL MEDICINE
Payer: MEDICARE

## 2021-03-30 ENCOUNTER — LAB VISIT (OUTPATIENT)
Dept: FAMILY MEDICINE | Facility: CLINIC | Age: 71
End: 2021-03-30
Payer: MEDICARE

## 2021-03-30 DIAGNOSIS — E11.9 DIABETES MELLITUS WITHOUT COMPLICATION: Primary | ICD-10-CM

## 2021-03-30 DIAGNOSIS — Z79.899 ENCOUNTER FOR LONG-TERM (CURRENT) USE OF OTHER MEDICATIONS: ICD-10-CM

## 2021-03-30 DIAGNOSIS — E78.00 PURE HYPERCHOLESTEROLEMIA: ICD-10-CM

## 2021-03-30 LAB
CHOLEST SERPL-MCNC: 98 MG/DL (ref 120–199)
CHOLEST/HDLC SERPL: 2 {RATIO} (ref 2–5)
ESTIMATED AVG GLUCOSE: 163 MG/DL (ref 68–131)
HBA1C MFR BLD: 7.3 % (ref 4–5.6)
HDLC SERPL-MCNC: 48 MG/DL (ref 40–75)
HDLC SERPL: 49 % (ref 20–50)
LDLC SERPL CALC-MCNC: 37.6 MG/DL (ref 63–159)
NONHDLC SERPL-MCNC: 50 MG/DL
TRIGL SERPL-MCNC: 62 MG/DL (ref 30–150)

## 2021-03-30 PROCEDURE — 80061 LIPID PANEL: CPT | Performed by: INTERNAL MEDICINE

## 2021-03-30 PROCEDURE — 83036 HEMOGLOBIN GLYCOSYLATED A1C: CPT | Performed by: INTERNAL MEDICINE

## 2021-03-31 ENCOUNTER — OFFICE VISIT (OUTPATIENT)
Dept: FAMILY MEDICINE | Facility: CLINIC | Age: 71
End: 2021-03-31
Payer: MEDICARE

## 2021-03-31 VITALS
HEART RATE: 66 BPM | WEIGHT: 242 LBS | SYSTOLIC BLOOD PRESSURE: 118 MMHG | OXYGEN SATURATION: 96 % | TEMPERATURE: 98 F | DIASTOLIC BLOOD PRESSURE: 76 MMHG | HEIGHT: 74 IN | RESPIRATION RATE: 16 BRPM | BODY MASS INDEX: 31.06 KG/M2

## 2021-03-31 DIAGNOSIS — E78.00 PURE HYPERCHOLESTEROLEMIA: ICD-10-CM

## 2021-03-31 DIAGNOSIS — I10 ESSENTIAL HYPERTENSION: ICD-10-CM

## 2021-03-31 DIAGNOSIS — E11.9 DIABETES MELLITUS WITHOUT COMPLICATION: Primary | ICD-10-CM

## 2021-03-31 PROCEDURE — 99213 PR OFFICE/OUTPT VISIT, EST, LEVL III, 20-29 MIN: ICD-10-PCS | Mod: S$GLB,,, | Performed by: INTERNAL MEDICINE

## 2021-03-31 PROCEDURE — 99213 OFFICE O/P EST LOW 20 MIN: CPT | Mod: S$GLB,,, | Performed by: INTERNAL MEDICINE

## 2021-05-12 DIAGNOSIS — E11.9 TYPE 2 DIABETES MELLITUS WITHOUT COMPLICATION: ICD-10-CM

## 2021-07-07 ENCOUNTER — PATIENT MESSAGE (OUTPATIENT)
Dept: ADMINISTRATIVE | Facility: HOSPITAL | Age: 71
End: 2021-07-07

## 2021-07-13 DIAGNOSIS — Z79.899 ENCOUNTER FOR LONG-TERM (CURRENT) USE OF OTHER MEDICATIONS: ICD-10-CM

## 2021-07-13 DIAGNOSIS — E11.9 DIABETES MELLITUS WITHOUT COMPLICATION: Primary | ICD-10-CM

## 2021-08-03 ENCOUNTER — LAB VISIT (OUTPATIENT)
Dept: FAMILY MEDICINE | Facility: CLINIC | Age: 71
End: 2021-08-03
Payer: MEDICARE

## 2021-08-03 DIAGNOSIS — Z79.899 ENCOUNTER FOR LONG-TERM (CURRENT) USE OF OTHER MEDICATIONS: ICD-10-CM

## 2021-08-03 DIAGNOSIS — E11.9 DIABETES MELLITUS WITHOUT COMPLICATION: Primary | ICD-10-CM

## 2021-08-03 LAB
ANION GAP SERPL CALC-SCNC: 11 MMOL/L (ref 8–16)
BUN SERPL-MCNC: 16 MG/DL (ref 8–23)
CALCIUM SERPL-MCNC: 9.8 MG/DL (ref 8.7–10.5)
CHLORIDE SERPL-SCNC: 105 MMOL/L (ref 95–110)
CO2 SERPL-SCNC: 23 MMOL/L (ref 23–29)
CREAT SERPL-MCNC: 1 MG/DL (ref 0.5–1.4)
EST. GFR  (AFRICAN AMERICAN): >60 ML/MIN/1.73 M^2
EST. GFR  (NON AFRICAN AMERICAN): >60 ML/MIN/1.73 M^2
GLUCOSE SERPL-MCNC: 112 MG/DL (ref 70–110)
POTASSIUM SERPL-SCNC: 3.9 MMOL/L (ref 3.5–5.1)
SODIUM SERPL-SCNC: 139 MMOL/L (ref 136–145)

## 2021-08-03 PROCEDURE — 83036 HEMOGLOBIN GLYCOSYLATED A1C: CPT | Performed by: INTERNAL MEDICINE

## 2021-08-03 PROCEDURE — 82043 UR ALBUMIN QUANTITATIVE: CPT | Performed by: INTERNAL MEDICINE

## 2021-08-03 PROCEDURE — 82570 ASSAY OF URINE CREATININE: CPT | Performed by: INTERNAL MEDICINE

## 2021-08-03 PROCEDURE — 80048 BASIC METABOLIC PNL TOTAL CA: CPT | Performed by: INTERNAL MEDICINE

## 2021-08-04 LAB
ALBUMIN/CREAT UR: 86.2 UG/MG (ref 0–30)
CREAT UR-MCNC: 58 MG/DL (ref 23–375)
ESTIMATED AVG GLUCOSE: 183 MG/DL (ref 68–131)
HBA1C MFR BLD: 8 % (ref 4–5.6)
MICROALBUMIN UR DL<=1MG/L-MCNC: 50 UG/ML

## 2021-08-05 ENCOUNTER — OFFICE VISIT (OUTPATIENT)
Dept: FAMILY MEDICINE | Facility: CLINIC | Age: 71
End: 2021-08-05
Payer: MEDICARE

## 2021-08-05 VITALS
DIASTOLIC BLOOD PRESSURE: 66 MMHG | TEMPERATURE: 98 F | SYSTOLIC BLOOD PRESSURE: 120 MMHG | RESPIRATION RATE: 16 BRPM | HEIGHT: 74 IN | OXYGEN SATURATION: 97 % | BODY MASS INDEX: 31.06 KG/M2 | WEIGHT: 242 LBS | HEART RATE: 57 BPM

## 2021-08-05 DIAGNOSIS — E78.00 PURE HYPERCHOLESTEROLEMIA: ICD-10-CM

## 2021-08-05 DIAGNOSIS — I10 ESSENTIAL HYPERTENSION: ICD-10-CM

## 2021-08-05 DIAGNOSIS — E11.9 DIABETES MELLITUS WITHOUT COMPLICATION: Primary | ICD-10-CM

## 2021-08-05 PROCEDURE — 99213 PR OFFICE/OUTPT VISIT, EST, LEVL III, 20-29 MIN: ICD-10-PCS | Mod: S$GLB,,, | Performed by: INTERNAL MEDICINE

## 2021-08-05 PROCEDURE — 99213 OFFICE O/P EST LOW 20 MIN: CPT | Mod: S$GLB,,, | Performed by: INTERNAL MEDICINE

## 2021-08-06 DIAGNOSIS — E11.9 DIABETES MELLITUS WITHOUT COMPLICATION: Primary | ICD-10-CM

## 2021-08-06 DIAGNOSIS — Z12.5 SPECIAL SCREENING FOR MALIGNANT NEOPLASM OF PROSTATE: ICD-10-CM

## 2021-08-06 DIAGNOSIS — I10 ESSENTIAL HYPERTENSION: ICD-10-CM

## 2021-08-06 DIAGNOSIS — E78.00 PURE HYPERCHOLESTEROLEMIA: ICD-10-CM

## 2021-08-06 DIAGNOSIS — Z79.899 ENCOUNTER FOR LONG-TERM (CURRENT) USE OF OTHER MEDICATIONS: ICD-10-CM

## 2021-08-06 DIAGNOSIS — E03.9 PRIMARY HYPOTHYROIDISM: ICD-10-CM

## 2021-08-06 DIAGNOSIS — D50.9 IRON DEFICIENCY ANEMIA, UNSPECIFIED: ICD-10-CM

## 2021-09-21 ENCOUNTER — LAB VISIT (OUTPATIENT)
Dept: FAMILY MEDICINE | Facility: CLINIC | Age: 71
End: 2021-09-21
Payer: MEDICARE

## 2021-09-21 DIAGNOSIS — E78.00 PURE HYPERCHOLESTEROLEMIA: ICD-10-CM

## 2021-09-21 DIAGNOSIS — E03.9 PRIMARY HYPOTHYROIDISM: ICD-10-CM

## 2021-09-21 DIAGNOSIS — Z12.5 SPECIAL SCREENING FOR MALIGNANT NEOPLASM OF PROSTATE: ICD-10-CM

## 2021-09-21 DIAGNOSIS — E11.9 DIABETES MELLITUS WITHOUT COMPLICATION: ICD-10-CM

## 2021-09-21 DIAGNOSIS — Z79.899 ENCOUNTER FOR LONG-TERM (CURRENT) USE OF OTHER MEDICATIONS: ICD-10-CM

## 2021-09-21 DIAGNOSIS — D50.9 IRON DEFICIENCY ANEMIA, UNSPECIFIED: ICD-10-CM

## 2021-09-21 LAB
ALBUMIN SERPL BCP-MCNC: 3.6 G/DL (ref 3.5–5.2)
ALP SERPL-CCNC: 89 U/L (ref 55–135)
ALT SERPL W/O P-5'-P-CCNC: 30 U/L (ref 10–44)
ANION GAP SERPL CALC-SCNC: 10 MMOL/L (ref 8–16)
AST SERPL-CCNC: 26 U/L (ref 10–40)
BASOPHILS # BLD AUTO: 0.12 K/UL (ref 0–0.2)
BASOPHILS NFR BLD: 1.6 % (ref 0–1.9)
BILIRUB SERPL-MCNC: 0.7 MG/DL (ref 0.1–1)
BUN SERPL-MCNC: 16 MG/DL (ref 8–23)
CALCIUM SERPL-MCNC: 9.3 MG/DL (ref 8.7–10.5)
CHLORIDE SERPL-SCNC: 106 MMOL/L (ref 95–110)
CHOLEST SERPL-MCNC: 97 MG/DL (ref 120–199)
CHOLEST/HDLC SERPL: 2.7 {RATIO} (ref 2–5)
CO2 SERPL-SCNC: 24 MMOL/L (ref 23–29)
COMPLEXED PSA SERPL-MCNC: 0.19 NG/ML (ref 0–4)
CREAT SERPL-MCNC: 1 MG/DL (ref 0.5–1.4)
DIFFERENTIAL METHOD: ABNORMAL
EOSINOPHIL # BLD AUTO: 0.2 K/UL (ref 0–0.5)
EOSINOPHIL NFR BLD: 3.1 % (ref 0–8)
ERYTHROCYTE [DISTWIDTH] IN BLOOD BY AUTOMATED COUNT: 15.4 % (ref 11.5–14.5)
EST. GFR  (AFRICAN AMERICAN): >60 ML/MIN/1.73 M^2
EST. GFR  (NON AFRICAN AMERICAN): >60 ML/MIN/1.73 M^2
GLUCOSE SERPL-MCNC: 106 MG/DL (ref 70–110)
HCT VFR BLD AUTO: 39.4 % (ref 40–54)
HDLC SERPL-MCNC: 36 MG/DL (ref 40–75)
HDLC SERPL: 37.1 % (ref 20–50)
HGB BLD-MCNC: 12.5 G/DL (ref 14–18)
IMM GRANULOCYTES # BLD AUTO: 0.02 K/UL (ref 0–0.04)
IMM GRANULOCYTES NFR BLD AUTO: 0.3 % (ref 0–0.5)
LDLC SERPL CALC-MCNC: 48 MG/DL (ref 63–159)
LYMPHOCYTES # BLD AUTO: 1.8 K/UL (ref 1–4.8)
LYMPHOCYTES NFR BLD: 23.9 % (ref 18–48)
MCH RBC QN AUTO: 26.8 PG (ref 27–31)
MCHC RBC AUTO-ENTMCNC: 31.7 G/DL (ref 32–36)
MCV RBC AUTO: 84 FL (ref 82–98)
MONOCYTES # BLD AUTO: 0.6 K/UL (ref 0.3–1)
MONOCYTES NFR BLD: 8.2 % (ref 4–15)
NEUTROPHILS # BLD AUTO: 4.8 K/UL (ref 1.8–7.7)
NEUTROPHILS NFR BLD: 62.9 % (ref 38–73)
NONHDLC SERPL-MCNC: 61 MG/DL
NRBC BLD-RTO: 0 /100 WBC
PLATELET # BLD AUTO: 257 K/UL (ref 150–450)
PMV BLD AUTO: 12 FL (ref 9.2–12.9)
POTASSIUM SERPL-SCNC: 4 MMOL/L (ref 3.5–5.1)
PROT SERPL-MCNC: 7.1 G/DL (ref 6–8.4)
RBC # BLD AUTO: 4.67 M/UL (ref 4.6–6.2)
SODIUM SERPL-SCNC: 140 MMOL/L (ref 136–145)
T4 FREE SERPL-MCNC: 0.85 NG/DL (ref 0.71–1.51)
TRIGL SERPL-MCNC: 65 MG/DL (ref 30–150)
TSH SERPL DL<=0.005 MIU/L-ACNC: 4.1 UIU/ML (ref 0.4–4)
WBC # BLD AUTO: 7.65 K/UL (ref 3.9–12.7)

## 2021-09-21 PROCEDURE — 84153 ASSAY OF PSA TOTAL: CPT | Performed by: INTERNAL MEDICINE

## 2021-09-21 PROCEDURE — 84443 ASSAY THYROID STIM HORMONE: CPT | Performed by: INTERNAL MEDICINE

## 2021-09-21 PROCEDURE — 80053 COMPREHEN METABOLIC PANEL: CPT | Performed by: INTERNAL MEDICINE

## 2021-09-21 PROCEDURE — 80061 LIPID PANEL: CPT | Performed by: INTERNAL MEDICINE

## 2021-09-21 PROCEDURE — 84439 ASSAY OF FREE THYROXINE: CPT | Performed by: INTERNAL MEDICINE

## 2021-09-21 PROCEDURE — 85025 COMPLETE CBC W/AUTO DIFF WBC: CPT | Performed by: INTERNAL MEDICINE

## 2021-09-21 PROCEDURE — 84466 ASSAY OF TRANSFERRIN: CPT | Performed by: INTERNAL MEDICINE

## 2021-09-22 LAB
IRON SERPL-MCNC: 62 UG/DL (ref 45–160)
SATURATED IRON: 17 % (ref 20–50)
TOTAL IRON BINDING CAPACITY: 360 UG/DL (ref 250–450)
TRANSFERRIN SERPL-MCNC: 243 MG/DL (ref 200–375)

## 2021-09-27 ENCOUNTER — OFFICE VISIT (OUTPATIENT)
Dept: FAMILY MEDICINE | Facility: CLINIC | Age: 71
End: 2021-09-27
Payer: MEDICARE

## 2021-09-27 VITALS
TEMPERATURE: 97 F | HEART RATE: 62 BPM | DIASTOLIC BLOOD PRESSURE: 70 MMHG | WEIGHT: 242 LBS | RESPIRATION RATE: 16 BRPM | SYSTOLIC BLOOD PRESSURE: 116 MMHG | HEIGHT: 74 IN | BODY MASS INDEX: 31.06 KG/M2 | OXYGEN SATURATION: 97 %

## 2021-09-27 DIAGNOSIS — D50.0 IRON DEFICIENCY ANEMIA DUE TO CHRONIC BLOOD LOSS: ICD-10-CM

## 2021-09-27 DIAGNOSIS — Z00.00 ROUTINE GENERAL MEDICAL EXAMINATION AT A HEALTH CARE FACILITY: Primary | ICD-10-CM

## 2021-09-27 DIAGNOSIS — E11.9 DIABETES MELLITUS WITHOUT COMPLICATION: ICD-10-CM

## 2021-09-27 DIAGNOSIS — Z98.84 H/O BARIATRIC SURGERY: ICD-10-CM

## 2021-09-27 DIAGNOSIS — D64.9 ANEMIA, UNSPECIFIED TYPE: ICD-10-CM

## 2021-09-27 DIAGNOSIS — E78.00 HYPERCHOLESTEROLEMIA: ICD-10-CM

## 2021-09-27 DIAGNOSIS — I10 ESSENTIAL HYPERTENSION: ICD-10-CM

## 2021-09-27 DIAGNOSIS — K28.9 ANASTOMOTIC ULCER S/P GASTRIC BYPASS: ICD-10-CM

## 2021-09-27 DIAGNOSIS — R00.1 BRADYCARDIA: ICD-10-CM

## 2021-09-27 DIAGNOSIS — E03.9 PRIMARY HYPOTHYROIDISM: ICD-10-CM

## 2021-09-27 PROCEDURE — 99397 PER PM REEVAL EST PAT 65+ YR: CPT | Mod: S$GLB,,, | Performed by: INTERNAL MEDICINE

## 2021-09-27 PROCEDURE — 99397 PR PREVENTIVE VISIT,EST,65 & OVER: ICD-10-PCS | Mod: S$GLB,,, | Performed by: INTERNAL MEDICINE

## 2021-12-23 ENCOUNTER — OFFICE VISIT (OUTPATIENT)
Dept: FAMILY MEDICINE | Facility: CLINIC | Age: 71
End: 2021-12-23
Payer: MEDICARE

## 2021-12-23 VITALS
TEMPERATURE: 97 F | DIASTOLIC BLOOD PRESSURE: 78 MMHG | OXYGEN SATURATION: 96 % | BODY MASS INDEX: 30.81 KG/M2 | HEART RATE: 68 BPM | WEIGHT: 240 LBS | SYSTOLIC BLOOD PRESSURE: 118 MMHG

## 2021-12-23 DIAGNOSIS — E11.9 DIABETES MELLITUS WITHOUT COMPLICATION: Primary | ICD-10-CM

## 2021-12-23 DIAGNOSIS — E78.00 HYPERCHOLESTEROLEMIA: ICD-10-CM

## 2021-12-23 DIAGNOSIS — E03.9 PRIMARY HYPOTHYROIDISM: ICD-10-CM

## 2021-12-23 PROCEDURE — 99213 OFFICE O/P EST LOW 20 MIN: CPT | Mod: S$GLB,,, | Performed by: INTERNAL MEDICINE

## 2021-12-23 PROCEDURE — 99213 PR OFFICE/OUTPT VISIT, EST, LEVL III, 20-29 MIN: ICD-10-PCS | Mod: S$GLB,,, | Performed by: INTERNAL MEDICINE

## 2021-12-23 RX ORDER — LEVOTHYROXINE SODIUM 137 UG/1
TABLET ORAL
Qty: 24 TABLET | Refills: 3 | Status: SHIPPED | OUTPATIENT
Start: 2021-12-23 | End: 2022-11-07

## 2021-12-23 RX ORDER — INSULIN DEGLUDEC 200 U/ML
INJECTION, SOLUTION SUBCUTANEOUS
Qty: 6 PEN | Refills: 6 | Status: SHIPPED | OUTPATIENT
Start: 2021-12-23 | End: 2022-01-03

## 2021-12-23 RX ORDER — INSULIN DEGLUDEC 200 U/ML
INJECTION, SOLUTION SUBCUTANEOUS
Status: CANCELLED | OUTPATIENT
Start: 2021-12-23

## 2022-02-04 ENCOUNTER — PATIENT OUTREACH (OUTPATIENT)
Dept: ADMINISTRATIVE | Facility: HOSPITAL | Age: 72
End: 2022-02-04
Payer: MEDICARE

## 2022-02-04 LAB
LEFT EYE DM RETINOPATHY: NEGATIVE
RIGHT EYE DM RETINOPATHY: NEGATIVE

## 2022-04-26 ENCOUNTER — LAB VISIT (OUTPATIENT)
Dept: FAMILY MEDICINE | Facility: CLINIC | Age: 72
End: 2022-04-26
Payer: MEDICARE

## 2022-04-26 DIAGNOSIS — E03.9 PRIMARY HYPOTHYROIDISM: ICD-10-CM

## 2022-04-26 DIAGNOSIS — E11.9 DIABETES MELLITUS WITHOUT COMPLICATION: ICD-10-CM

## 2022-04-26 LAB
T4 FREE SERPL-MCNC: 0.87 NG/DL (ref 0.71–1.51)
TSH SERPL DL<=0.005 MIU/L-ACNC: 3.51 UIU/ML (ref 0.4–4)

## 2022-04-26 PROCEDURE — 84443 ASSAY THYROID STIM HORMONE: CPT | Performed by: INTERNAL MEDICINE

## 2022-04-26 PROCEDURE — 84439 ASSAY OF FREE THYROXINE: CPT | Performed by: INTERNAL MEDICINE

## 2022-04-26 PROCEDURE — 83036 HEMOGLOBIN GLYCOSYLATED A1C: CPT | Performed by: INTERNAL MEDICINE

## 2022-04-27 ENCOUNTER — OFFICE VISIT (OUTPATIENT)
Dept: FAMILY MEDICINE | Facility: CLINIC | Age: 72
End: 2022-04-27
Payer: MEDICARE

## 2022-04-27 VITALS
DIASTOLIC BLOOD PRESSURE: 78 MMHG | WEIGHT: 242 LBS | HEART RATE: 60 BPM | SYSTOLIC BLOOD PRESSURE: 128 MMHG | TEMPERATURE: 98 F | HEIGHT: 74 IN | BODY MASS INDEX: 31.06 KG/M2 | OXYGEN SATURATION: 96 %

## 2022-04-27 DIAGNOSIS — E03.9 PRIMARY HYPOTHYROIDISM: Primary | ICD-10-CM

## 2022-04-27 DIAGNOSIS — E78.00 HYPERCHOLESTEROLEMIA: ICD-10-CM

## 2022-04-27 DIAGNOSIS — Z98.84 H/O BARIATRIC SURGERY: ICD-10-CM

## 2022-04-27 DIAGNOSIS — R00.1 BRADYCARDIA: ICD-10-CM

## 2022-04-27 DIAGNOSIS — E11.9 DIABETES MELLITUS WITHOUT COMPLICATION: ICD-10-CM

## 2022-04-27 LAB
ESTIMATED AVG GLUCOSE: 203 MG/DL (ref 68–131)
HBA1C MFR BLD: 8.7 % (ref 4–5.6)

## 2022-04-27 PROCEDURE — 3078F DIAST BP <80 MM HG: CPT | Mod: CPTII,S$GLB,, | Performed by: INTERNAL MEDICINE

## 2022-04-27 PROCEDURE — 1126F PR PAIN SEVERITY QUANTIFIED, NO PAIN PRESENT: ICD-10-PCS | Mod: CPTII,S$GLB,, | Performed by: INTERNAL MEDICINE

## 2022-04-27 PROCEDURE — 3288F FALL RISK ASSESSMENT DOCD: CPT | Mod: CPTII,S$GLB,, | Performed by: INTERNAL MEDICINE

## 2022-04-27 PROCEDURE — 3008F PR BODY MASS INDEX (BMI) DOCUMENTED: ICD-10-PCS | Mod: CPTII,S$GLB,, | Performed by: INTERNAL MEDICINE

## 2022-04-27 PROCEDURE — 1101F PR PT FALLS ASSESS DOC 0-1 FALLS W/OUT INJ PAST YR: ICD-10-PCS | Mod: CPTII,S$GLB,, | Performed by: INTERNAL MEDICINE

## 2022-04-27 PROCEDURE — 1159F MED LIST DOCD IN RCRD: CPT | Mod: CPTII,S$GLB,, | Performed by: INTERNAL MEDICINE

## 2022-04-27 PROCEDURE — 3052F HG A1C>EQUAL 8.0%<EQUAL 9.0%: CPT | Mod: CPTII,S$GLB,, | Performed by: INTERNAL MEDICINE

## 2022-04-27 PROCEDURE — 3288F PR FALLS RISK ASSESSMENT DOCUMENTED: ICD-10-PCS | Mod: CPTII,S$GLB,, | Performed by: INTERNAL MEDICINE

## 2022-04-27 PROCEDURE — 4010F ACE/ARB THERAPY RXD/TAKEN: CPT | Mod: CPTII,S$GLB,, | Performed by: INTERNAL MEDICINE

## 2022-04-27 PROCEDURE — 1159F PR MEDICATION LIST DOCUMENTED IN MEDICAL RECORD: ICD-10-PCS | Mod: CPTII,S$GLB,, | Performed by: INTERNAL MEDICINE

## 2022-04-27 PROCEDURE — 1126F AMNT PAIN NOTED NONE PRSNT: CPT | Mod: CPTII,S$GLB,, | Performed by: INTERNAL MEDICINE

## 2022-04-27 PROCEDURE — 99214 OFFICE O/P EST MOD 30 MIN: CPT | Mod: S$GLB,,, | Performed by: INTERNAL MEDICINE

## 2022-04-27 PROCEDURE — 3078F PR MOST RECENT DIASTOLIC BLOOD PRESSURE < 80 MM HG: ICD-10-PCS | Mod: CPTII,S$GLB,, | Performed by: INTERNAL MEDICINE

## 2022-04-27 PROCEDURE — 3008F BODY MASS INDEX DOCD: CPT | Mod: CPTII,S$GLB,, | Performed by: INTERNAL MEDICINE

## 2022-04-27 PROCEDURE — 3074F PR MOST RECENT SYSTOLIC BLOOD PRESSURE < 130 MM HG: ICD-10-PCS | Mod: CPTII,S$GLB,, | Performed by: INTERNAL MEDICINE

## 2022-04-27 PROCEDURE — 3052F PR MOST RECENT HEMOGLOBIN A1C LEVEL 8.0 - < 9.0%: ICD-10-PCS | Mod: CPTII,S$GLB,, | Performed by: INTERNAL MEDICINE

## 2022-04-27 PROCEDURE — 99214 PR OFFICE/OUTPT VISIT, EST, LEVL IV, 30-39 MIN: ICD-10-PCS | Mod: S$GLB,,, | Performed by: INTERNAL MEDICINE

## 2022-04-27 PROCEDURE — 4010F PR ACE/ARB THEARPY RXD/TAKEN: ICD-10-PCS | Mod: CPTII,S$GLB,, | Performed by: INTERNAL MEDICINE

## 2022-04-27 PROCEDURE — 1101F PT FALLS ASSESS-DOCD LE1/YR: CPT | Mod: CPTII,S$GLB,, | Performed by: INTERNAL MEDICINE

## 2022-04-27 PROCEDURE — 3074F SYST BP LT 130 MM HG: CPT | Mod: CPTII,S$GLB,, | Performed by: INTERNAL MEDICINE

## 2022-04-27 NOTE — PROGRESS NOTES
Subjective:       Patient ID: John Martini is a 72 y.o. male.    Chief Complaint: Follow-up (Patient is here to review and discuss lab results from bloodwork drawn on 4/26/2022.)    Protective Sensation (w/ 10 gram monofilament):  Right: Decreased  Left: Absent    Visual Inspection:  Onychomycosis -  Bilateral    Pedal Pulses:   Right: Present  Left: Present    Posterior tibialis:   Right:Present  Left: Present    Presents today for follow-up of his lab work carried out at this facility on April 26, 2022. This included diabetic follow-up on thyroid function testing.  Reports are available at time of exam will be discussed with the patient at length.    With regards to his diabetic control hemoglobin A1c returned at 8.7 giving him an estimated glucose over the last 60 days of 203. Needless to say this is over the goal of a hemoglobin A1c equal to less than 7.5 so patient is to follow a diet better on for therapeutic lifestyle changes and repeat hemoglobin A1c will be done in 90 days.  Specifically the lab will be carried out at this facility July 12, 2022 with follow-up appointment.  He is to increase his Tresiba to 32 units subcutaneously at night.  Continue the Glyxambi 5-251 every day and metformin 500 mg extended release 3 every evening    With regards to the care gaps the foot examination was just carried out as above and he has already been notified by his gastroenterologist Dr. Vides that is due his follow-up colonoscopy.  Patient is to call to arrange for same.    On the rest of the lab work thyroid function testing is unremarkable with a TSH of 3.51 and a free T4 0.87.  He is taking levothyroxine 137 mcg 2 tablets every Sunday.  Lab work can be repeated in a year's time.    Medication list has been reviewed and there is no need for refills except for the test strips for his capillary blood glucose equipment.  This prescription is to be issued today.    Patient is compliant to his atorvastatin  without any signs of statin myalgia.    Patient is up-to-date with his eye examination with Dr. Montelongo    Review of Systems   All other systems reviewed and are negative.        Objective:      Physical Exam  Vitals and nursing note reviewed.   Constitutional:       General: He is not in acute distress.     Appearance: Normal appearance. He is obese. He is not ill-appearing, toxic-appearing or diaphoretic.   HENT:      Head: Normocephalic and atraumatic.   Cardiovascular:      Rate and Rhythm: Normal rate and regular rhythm.      Pulses: Normal pulses.      Heart sounds: Normal heart sounds. No murmur heard.  Pulmonary:      Effort: Pulmonary effort is normal. Respiratory distress:         Breath sounds: Normal breath sounds.   Musculoskeletal:      Right lower leg: No edema.      Left lower leg: No edema.   Skin:     General: Skin is warm and dry.      Capillary Refill: Capillary refill takes 2 to 3 seconds.      Coloration: Skin is not jaundiced or pale.   Neurological:      Mental Status: He is alert and oriented to person, place, and time. Mental status is at baseline.      Cranial Nerves: No cranial nerve deficit.      Sensory: No sensory deficit.      Motor: No weakness.      Coordination: Coordination normal.      Gait: Gait normal.   Psychiatric:         Mood and Affect: Mood normal.         Behavior: Behavior normal.         Thought Content: Thought content normal.         Judgment: Judgment normal.         Assessment:       Problem List Items Addressed This Visit    None     Visit Diagnoses     Primary hypothyroidism    -  Primary    Diabetes mellitus without complication        Hypercholesterolemia        Bradycardia        H/O bariatric surgery              Plan:       Patient's diabetes is not controlled as expected with a goal of hemoglobin A1c equal to or less than 7.5.  The Tresiba will be increased to 32 units subcutaneously at night and the Glyxambi and metformin are to be continued as now.   Hemoglobin A1c scheduled for July 12, 2022 with a follow-up appointment.    Foot examination was carried out today.    Patient is up-to-date with his eye examination.    Hypothyroidism is appropriately supplemented with the levothyroxine 137 mcg 2 tablets every Sunday.  This lab work can be repeated in a year's time.    Patient is usually bradycardic and today his of 60 per which is in his usual range and definitely not symptomatic.    He is up-to-date with the microalbumin screen.    Medication list was reviewed and there is no need for any refills except for his testing strips which will be issued today.    Patient is to be scheduled for his colonoscopy for colorectal cancer screening which was due as of April 16, 2022. He sees Dr. Vides     Blood pressure is well controlled with current therapy with the Avapro 150 mg every day.    As it pertains to the hypercholesterolemia his LDL was 48 when last tested September of 2021 and will be repeated

## 2022-05-06 ENCOUNTER — PATIENT OUTREACH (OUTPATIENT)
Dept: ADMINISTRATIVE | Facility: HOSPITAL | Age: 72
End: 2022-05-06
Payer: MEDICARE

## 2022-05-17 ENCOUNTER — TELEPHONE (OUTPATIENT)
Dept: FAMILY MEDICINE | Facility: CLINIC | Age: 72
End: 2022-05-17
Payer: MEDICARE

## 2022-05-17 DIAGNOSIS — E11.9 DIABETES MELLITUS WITHOUT COMPLICATION: Primary | ICD-10-CM

## 2022-05-17 RX ORDER — PEN NEEDLE, DIABETIC 30 GX3/16"
NEEDLE, DISPOSABLE MISCELLANEOUS
Qty: 100 EACH | Refills: 3 | Status: SHIPPED | OUTPATIENT
Start: 2022-05-17

## 2022-05-20 ENCOUNTER — HOSPITAL ENCOUNTER (OUTPATIENT)
Facility: HOSPITAL | Age: 72
Discharge: HOME HEALTH | End: 2022-05-25
Attending: EMERGENCY MEDICINE | Admitting: FAMILY MEDICINE
Payer: MEDICARE

## 2022-05-20 DIAGNOSIS — R31.9 HEMATURIA, UNSPECIFIED TYPE: ICD-10-CM

## 2022-05-20 DIAGNOSIS — D64.9 ANEMIA, UNSPECIFIED TYPE: ICD-10-CM

## 2022-05-20 DIAGNOSIS — S30.1XXA CONTUSION OF ABDOMINAL WALL, INITIAL ENCOUNTER: ICD-10-CM

## 2022-05-20 DIAGNOSIS — E87.6 HYPOKALEMIA: ICD-10-CM

## 2022-05-20 DIAGNOSIS — R53.1 WEAKNESS: Primary | ICD-10-CM

## 2022-05-20 DIAGNOSIS — R07.9 CHEST PAIN: ICD-10-CM

## 2022-05-20 PROBLEM — E86.0 DEHYDRATION: Status: ACTIVE | Noted: 2022-05-20

## 2022-05-20 PROBLEM — R53.81 PHYSICAL DECONDITIONING: Status: ACTIVE | Noted: 2022-05-20

## 2022-05-20 PROBLEM — K59.1 FUNCTIONAL DIARRHEA: Status: ACTIVE | Noted: 2022-05-20

## 2022-05-20 PROBLEM — E11.9 DM2 (DIABETES MELLITUS, TYPE 2): Status: ACTIVE | Noted: 2022-05-20

## 2022-05-20 PROBLEM — E05.90 HYPERTHYROIDISM: Status: ACTIVE | Noted: 2022-05-20

## 2022-05-20 PROBLEM — K21.9 GERD (GASTROESOPHAGEAL REFLUX DISEASE): Status: ACTIVE | Noted: 2022-05-20

## 2022-05-20 PROBLEM — I10 HTN (HYPERTENSION): Status: ACTIVE | Noted: 2022-05-20

## 2022-05-20 LAB
ALBUMIN SERPL BCP-MCNC: 3.4 G/DL (ref 3.5–5.2)
ALP SERPL-CCNC: 57 U/L (ref 55–135)
ALT SERPL W/O P-5'-P-CCNC: 23 U/L (ref 10–44)
ANION GAP SERPL CALC-SCNC: 11 MMOL/L (ref 8–16)
ANION GAP SERPL CALC-SCNC: 13 MMOL/L (ref 8–16)
AST SERPL-CCNC: 25 U/L (ref 10–40)
BACTERIA #/AREA URNS HPF: NEGATIVE /HPF
BASOPHILS # BLD AUTO: 0.11 K/UL (ref 0–0.2)
BASOPHILS NFR BLD: 1.2 % (ref 0–1.9)
BILIRUB SERPL-MCNC: 1 MG/DL (ref 0.1–1)
BILIRUB UR QL STRIP: NEGATIVE
BNP SERPL-MCNC: 87 PG/ML (ref 0–99)
BUN SERPL-MCNC: 9 MG/DL (ref 8–23)
BUN SERPL-MCNC: 9 MG/DL (ref 8–23)
CALCIUM SERPL-MCNC: 8 MG/DL (ref 8.7–10.5)
CALCIUM SERPL-MCNC: 8.2 MG/DL (ref 8.7–10.5)
CHLORIDE SERPL-SCNC: 96 MMOL/L (ref 95–110)
CHLORIDE SERPL-SCNC: 97 MMOL/L (ref 95–110)
CLARITY UR: CLEAR
CO2 SERPL-SCNC: 22 MMOL/L (ref 23–29)
CO2 SERPL-SCNC: 24 MMOL/L (ref 23–29)
COLOR UR: YELLOW
CREAT SERPL-MCNC: 0.9 MG/DL (ref 0.5–1.4)
CREAT SERPL-MCNC: 1 MG/DL (ref 0.5–1.4)
DIFFERENTIAL METHOD: ABNORMAL
EOSINOPHIL # BLD AUTO: 0 K/UL (ref 0–0.5)
EOSINOPHIL NFR BLD: 0.3 % (ref 0–8)
ERYTHROCYTE [DISTWIDTH] IN BLOOD BY AUTOMATED COUNT: 14.6 % (ref 11.5–14.5)
EST. GFR  (AFRICAN AMERICAN): >60 ML/MIN/1.73 M^2
EST. GFR  (AFRICAN AMERICAN): >60 ML/MIN/1.73 M^2
EST. GFR  (NON AFRICAN AMERICAN): >60 ML/MIN/1.73 M^2
EST. GFR  (NON AFRICAN AMERICAN): >60 ML/MIN/1.73 M^2
GLUCOSE SERPL-MCNC: 100 MG/DL (ref 70–110)
GLUCOSE SERPL-MCNC: 105 MG/DL (ref 70–110)
GLUCOSE SERPL-MCNC: 77 MG/DL (ref 70–110)
GLUCOSE SERPL-MCNC: 86 MG/DL (ref 70–110)
GLUCOSE UR QL STRIP: ABNORMAL
HCT VFR BLD AUTO: 38 % (ref 40–54)
HGB BLD-MCNC: 12.4 G/DL (ref 14–18)
HGB UR QL STRIP: ABNORMAL
HYALINE CASTS #/AREA URNS LPF: 3 /LPF
IMM GRANULOCYTES # BLD AUTO: 0.1 K/UL (ref 0–0.04)
IMM GRANULOCYTES NFR BLD AUTO: 1.1 % (ref 0–0.5)
INFLUENZA A, MOLECULAR: NEGATIVE
INFLUENZA B, MOLECULAR: NEGATIVE
INR PPP: 1.1
KETONES UR QL STRIP: ABNORMAL
LACTATE SERPL-SCNC: 0.8 MMOL/L (ref 0.5–1.9)
LEUKOCYTE ESTERASE UR QL STRIP: NEGATIVE
LYMPHOCYTES # BLD AUTO: 1.6 K/UL (ref 1–4.8)
LYMPHOCYTES NFR BLD: 17.3 % (ref 18–48)
MAGNESIUM SERPL-MCNC: 1.8 MG/DL (ref 1.6–2.6)
MCH RBC QN AUTO: 26.3 PG (ref 27–31)
MCHC RBC AUTO-ENTMCNC: 32.6 G/DL (ref 32–36)
MCV RBC AUTO: 81 FL (ref 82–98)
MICROSCOPIC COMMENT: ABNORMAL
MONOCYTES # BLD AUTO: 0.9 K/UL (ref 0.3–1)
MONOCYTES NFR BLD: 9.6 % (ref 4–15)
NEUTROPHILS # BLD AUTO: 6.5 K/UL (ref 1.8–7.7)
NEUTROPHILS NFR BLD: 70.5 % (ref 38–73)
NITRITE UR QL STRIP: NEGATIVE
NRBC BLD-RTO: 0 /100 WBC
PH UR STRIP: 7 [PH] (ref 5–8)
PLATELET # BLD AUTO: 286 K/UL (ref 150–450)
PLATELET BLD QL SMEAR: ABNORMAL
PMV BLD AUTO: 11.6 FL (ref 9.2–12.9)
POTASSIUM SERPL-SCNC: 2.8 MMOL/L (ref 3.5–5.1)
POTASSIUM SERPL-SCNC: 2.9 MMOL/L (ref 3.5–5.1)
PROCALCITONIN SERPL IA-MCNC: <0.05 NG/ML (ref 0–0.5)
PROT SERPL-MCNC: 6.8 G/DL (ref 6–8.4)
PROT UR QL STRIP: ABNORMAL
PROTHROMBIN TIME: 13.8 SEC (ref 11.4–13.7)
RBC # BLD AUTO: 4.71 M/UL (ref 4.6–6.2)
RBC #/AREA URNS HPF: 28 /HPF (ref 0–4)
SARS-COV-2 RDRP RESP QL NAA+PROBE: NEGATIVE
SODIUM SERPL-SCNC: 131 MMOL/L (ref 136–145)
SODIUM SERPL-SCNC: 132 MMOL/L (ref 136–145)
SP GR UR STRIP: 1.02 (ref 1–1.03)
SPECIMEN SOURCE: NORMAL
SQUAMOUS #/AREA URNS HPF: 2 /HPF
TROPONIN I SERPL DL<=0.01 NG/ML-MCNC: 0.03 NG/ML
TROPONIN I SERPL DL<=0.01 NG/ML-MCNC: 0.03 NG/ML
TROPONIN I SERPL DL<=0.01 NG/ML-MCNC: <0.03 NG/ML
TSH SERPL DL<=0.005 MIU/L-ACNC: 2.15 UIU/ML (ref 0.34–5.6)
URN SPEC COLLECT METH UR: ABNORMAL
UROBILINOGEN UR STRIP-ACNC: NEGATIVE EU/DL
WBC # BLD AUTO: 9.17 K/UL (ref 3.9–12.7)
WBC #/AREA URNS HPF: 2 /HPF (ref 0–5)
YEAST URNS QL MICRO: ABNORMAL

## 2022-05-20 PROCEDURE — 87040 BLOOD CULTURE FOR BACTERIA: CPT | Performed by: EMERGENCY MEDICINE

## 2022-05-20 PROCEDURE — 99285 EMERGENCY DEPT VISIT HI MDM: CPT | Mod: 25

## 2022-05-20 PROCEDURE — G0378 HOSPITAL OBSERVATION PER HR: HCPCS

## 2022-05-20 PROCEDURE — 83605 ASSAY OF LACTIC ACID: CPT | Performed by: EMERGENCY MEDICINE

## 2022-05-20 PROCEDURE — 25000003 PHARM REV CODE 250: Performed by: FAMILY MEDICINE

## 2022-05-20 PROCEDURE — 36415 COLL VENOUS BLD VENIPUNCTURE: CPT | Performed by: FAMILY MEDICINE

## 2022-05-20 PROCEDURE — 63600175 PHARM REV CODE 636 W HCPCS: Performed by: FAMILY MEDICINE

## 2022-05-20 PROCEDURE — 83036 HEMOGLOBIN GLYCOSYLATED A1C: CPT | Performed by: FAMILY MEDICINE

## 2022-05-20 PROCEDURE — 84484 ASSAY OF TROPONIN QUANT: CPT | Mod: 91 | Performed by: FAMILY MEDICINE

## 2022-05-20 PROCEDURE — 80053 COMPREHEN METABOLIC PANEL: CPT | Performed by: EMERGENCY MEDICINE

## 2022-05-20 PROCEDURE — 85610 PROTHROMBIN TIME: CPT | Performed by: EMERGENCY MEDICINE

## 2022-05-20 PROCEDURE — 93010 EKG 12-LEAD: ICD-10-PCS | Mod: ,,, | Performed by: SPECIALIST

## 2022-05-20 PROCEDURE — 93010 ELECTROCARDIOGRAM REPORT: CPT | Mod: ,,, | Performed by: SPECIALIST

## 2022-05-20 PROCEDURE — 85025 COMPLETE CBC W/AUTO DIFF WBC: CPT | Performed by: EMERGENCY MEDICINE

## 2022-05-20 PROCEDURE — 83735 ASSAY OF MAGNESIUM: CPT | Performed by: EMERGENCY MEDICINE

## 2022-05-20 PROCEDURE — 25000003 PHARM REV CODE 250: Performed by: EMERGENCY MEDICINE

## 2022-05-20 PROCEDURE — 94799 UNLISTED PULMONARY SVC/PX: CPT

## 2022-05-20 PROCEDURE — 93005 ELECTROCARDIOGRAM TRACING: CPT | Performed by: SPECIALIST

## 2022-05-20 PROCEDURE — 84145 PROCALCITONIN (PCT): CPT | Mod: 91 | Performed by: EMERGENCY MEDICINE

## 2022-05-20 PROCEDURE — 84484 ASSAY OF TROPONIN QUANT: CPT | Mod: 91 | Performed by: EMERGENCY MEDICINE

## 2022-05-20 PROCEDURE — 96375 TX/PRO/DX INJ NEW DRUG ADDON: CPT

## 2022-05-20 PROCEDURE — 63600175 PHARM REV CODE 636 W HCPCS: Performed by: EMERGENCY MEDICINE

## 2022-05-20 PROCEDURE — 83880 ASSAY OF NATRIURETIC PEPTIDE: CPT | Performed by: EMERGENCY MEDICINE

## 2022-05-20 PROCEDURE — 96372 THER/PROPH/DIAG INJ SC/IM: CPT | Performed by: FAMILY MEDICINE

## 2022-05-20 PROCEDURE — U0002 COVID-19 LAB TEST NON-CDC: HCPCS | Performed by: EMERGENCY MEDICINE

## 2022-05-20 PROCEDURE — 99900031 HC PATIENT EDUCATION (STAT)

## 2022-05-20 PROCEDURE — 96366 THER/PROPH/DIAG IV INF ADDON: CPT

## 2022-05-20 PROCEDURE — 84443 ASSAY THYROID STIM HORMONE: CPT | Performed by: EMERGENCY MEDICINE

## 2022-05-20 PROCEDURE — 96365 THER/PROPH/DIAG IV INF INIT: CPT

## 2022-05-20 PROCEDURE — 96367 TX/PROPH/DG ADDL SEQ IV INF: CPT

## 2022-05-20 PROCEDURE — 81001 URINALYSIS AUTO W/SCOPE: CPT | Performed by: EMERGENCY MEDICINE

## 2022-05-20 PROCEDURE — 80048 BASIC METABOLIC PNL TOTAL CA: CPT | Mod: XB | Performed by: FAMILY MEDICINE

## 2022-05-20 PROCEDURE — 96361 HYDRATE IV INFUSION ADD-ON: CPT

## 2022-05-20 PROCEDURE — 99900035 HC TECH TIME PER 15 MIN (STAT)

## 2022-05-20 PROCEDURE — 25000003 PHARM REV CODE 250: Performed by: INTERNAL MEDICINE

## 2022-05-20 PROCEDURE — 94761 N-INVAS EAR/PLS OXIMETRY MLT: CPT

## 2022-05-20 PROCEDURE — 36415 COLL VENOUS BLD VENIPUNCTURE: CPT | Performed by: EMERGENCY MEDICINE

## 2022-05-20 PROCEDURE — 87502 INFLUENZA DNA AMP PROBE: CPT | Performed by: EMERGENCY MEDICINE

## 2022-05-20 RX ORDER — SIMETHICONE 80 MG
1 TABLET,CHEWABLE ORAL 4 TIMES DAILY PRN
Status: DISCONTINUED | OUTPATIENT
Start: 2022-05-20 | End: 2022-05-25 | Stop reason: HOSPADM

## 2022-05-20 RX ORDER — SODIUM CHLORIDE 9 MG/ML
INJECTION, SOLUTION INTRAVENOUS CONTINUOUS
Status: DISCONTINUED | OUTPATIENT
Start: 2022-05-20 | End: 2022-05-21

## 2022-05-20 RX ORDER — HYDROCODONE BITARTRATE AND ACETAMINOPHEN 5; 325 MG/1; MG/1
1 TABLET ORAL EVERY 4 HOURS PRN
Status: DISCONTINUED | OUTPATIENT
Start: 2022-05-20 | End: 2022-05-25 | Stop reason: HOSPADM

## 2022-05-20 RX ORDER — TALC
6 POWDER (GRAM) TOPICAL NIGHTLY PRN
Status: DISCONTINUED | OUTPATIENT
Start: 2022-05-20 | End: 2022-05-25 | Stop reason: HOSPADM

## 2022-05-20 RX ORDER — IBUPROFEN 200 MG
16 TABLET ORAL
Status: DISCONTINUED | OUTPATIENT
Start: 2022-05-20 | End: 2022-05-25 | Stop reason: HOSPADM

## 2022-05-20 RX ORDER — NALOXONE HCL 0.4 MG/ML
0.02 VIAL (ML) INJECTION
Status: DISCONTINUED | OUTPATIENT
Start: 2022-05-20 | End: 2022-05-25 | Stop reason: HOSPADM

## 2022-05-20 RX ORDER — POLYETHYLENE GLYCOL 3350 17 G/17G
17 POWDER, FOR SOLUTION ORAL 2 TIMES DAILY PRN
Status: DISCONTINUED | OUTPATIENT
Start: 2022-05-20 | End: 2022-05-25 | Stop reason: HOSPADM

## 2022-05-20 RX ORDER — ENOXAPARIN SODIUM 100 MG/ML
40 INJECTION SUBCUTANEOUS EVERY 24 HOURS
Status: DISCONTINUED | OUTPATIENT
Start: 2022-05-20 | End: 2022-05-25 | Stop reason: HOSPADM

## 2022-05-20 RX ORDER — SODIUM CHLORIDE 0.9 % (FLUSH) 0.9 %
10 SYRINGE (ML) INJECTION
Status: DISCONTINUED | OUTPATIENT
Start: 2022-05-20 | End: 2022-05-25 | Stop reason: HOSPADM

## 2022-05-20 RX ORDER — GLUCAGON 1 MG
1 KIT INJECTION
Status: DISCONTINUED | OUTPATIENT
Start: 2022-05-20 | End: 2022-05-25 | Stop reason: HOSPADM

## 2022-05-20 RX ORDER — INSULIN ASPART 100 [IU]/ML
1-10 INJECTION, SOLUTION INTRAVENOUS; SUBCUTANEOUS
Status: DISCONTINUED | OUTPATIENT
Start: 2022-05-20 | End: 2022-05-25 | Stop reason: HOSPADM

## 2022-05-20 RX ORDER — PANTOPRAZOLE SODIUM 40 MG/1
40 TABLET, DELAYED RELEASE ORAL
Status: DISCONTINUED | OUTPATIENT
Start: 2022-05-21 | End: 2022-05-25 | Stop reason: HOSPADM

## 2022-05-20 RX ORDER — POTASSIUM CHLORIDE 20 MEQ/1
40 TABLET, EXTENDED RELEASE ORAL ONCE
Status: COMPLETED | OUTPATIENT
Start: 2022-05-20 | End: 2022-05-20

## 2022-05-20 RX ORDER — ATORVASTATIN CALCIUM 40 MG/1
40 TABLET, FILM COATED ORAL NIGHTLY
Status: DISCONTINUED | OUTPATIENT
Start: 2022-05-20 | End: 2022-05-25 | Stop reason: HOSPADM

## 2022-05-20 RX ORDER — HYDRALAZINE HYDROCHLORIDE 20 MG/ML
5 INJECTION INTRAMUSCULAR; INTRAVENOUS EVERY 4 HOURS PRN
Status: DISCONTINUED | OUTPATIENT
Start: 2022-05-20 | End: 2022-05-25 | Stop reason: HOSPADM

## 2022-05-20 RX ORDER — CEFEPIME HYDROCHLORIDE 1 G/50ML
2 INJECTION, SOLUTION INTRAVENOUS
Status: COMPLETED | OUTPATIENT
Start: 2022-05-20 | End: 2022-05-20

## 2022-05-20 RX ORDER — VANCOMYCIN HCL IN 5 % DEXTROSE 1G/250ML
1000 PLASTIC BAG, INJECTION (ML) INTRAVENOUS ONCE
Status: DISCONTINUED | OUTPATIENT
Start: 2022-05-20 | End: 2022-05-20

## 2022-05-20 RX ORDER — AMOXICILLIN 250 MG
1 CAPSULE ORAL 2 TIMES DAILY PRN
Status: DISCONTINUED | OUTPATIENT
Start: 2022-05-20 | End: 2022-05-25 | Stop reason: HOSPADM

## 2022-05-20 RX ORDER — AMLODIPINE BESYLATE 5 MG/1
5 TABLET ORAL DAILY
Status: DISCONTINUED | OUTPATIENT
Start: 2022-05-21 | End: 2022-05-22

## 2022-05-20 RX ORDER — ACETAMINOPHEN 325 MG/1
650 TABLET ORAL EVERY 8 HOURS PRN
Status: DISCONTINUED | OUTPATIENT
Start: 2022-05-20 | End: 2022-05-25 | Stop reason: HOSPADM

## 2022-05-20 RX ORDER — IPRATROPIUM BROMIDE AND ALBUTEROL SULFATE 2.5; .5 MG/3ML; MG/3ML
3 SOLUTION RESPIRATORY (INHALATION) EVERY 4 HOURS PRN
Status: DISCONTINUED | OUTPATIENT
Start: 2022-05-20 | End: 2022-05-25 | Stop reason: HOSPADM

## 2022-05-20 RX ORDER — GABAPENTIN 300 MG/1
600 CAPSULE ORAL NIGHTLY
COMMUNITY
End: 2022-05-20

## 2022-05-20 RX ORDER — AMOXICILLIN AND CLAVULANATE POTASSIUM 875; 125 MG/1; MG/1
1 TABLET, FILM COATED ORAL 2 TIMES DAILY
Status: ON HOLD | COMMUNITY
Start: 2022-05-15 | End: 2022-05-25 | Stop reason: HOSPADM

## 2022-05-20 RX ORDER — IBUPROFEN 200 MG
24 TABLET ORAL
Status: DISCONTINUED | OUTPATIENT
Start: 2022-05-20 | End: 2022-05-25 | Stop reason: HOSPADM

## 2022-05-20 RX ORDER — ONDANSETRON 2 MG/ML
4 INJECTION INTRAMUSCULAR; INTRAVENOUS EVERY 6 HOURS PRN
Status: DISCONTINUED | OUTPATIENT
Start: 2022-05-20 | End: 2022-05-25 | Stop reason: HOSPADM

## 2022-05-20 RX ORDER — HYDRALAZINE HYDROCHLORIDE 20 MG/ML
10 INJECTION INTRAMUSCULAR; INTRAVENOUS EVERY 4 HOURS PRN
Status: DISCONTINUED | OUTPATIENT
Start: 2022-05-20 | End: 2022-05-25 | Stop reason: HOSPADM

## 2022-05-20 RX ORDER — GABAPENTIN 300 MG/1
300 CAPSULE ORAL 2 TIMES DAILY
Status: DISCONTINUED | OUTPATIENT
Start: 2022-05-20 | End: 2022-05-25 | Stop reason: HOSPADM

## 2022-05-20 RX ORDER — DEXTROSE 50 % IN WATER (D50W) INTRAVENOUS SYRINGE
25
Status: DISCONTINUED | OUTPATIENT
Start: 2022-05-20 | End: 2022-05-25 | Stop reason: HOSPADM

## 2022-05-20 RX ADMIN — CEFEPIME HYDROCHLORIDE 2 G: 2 INJECTION, SOLUTION INTRAVENOUS at 12:05

## 2022-05-20 RX ADMIN — ENOXAPARIN SODIUM 40 MG: 40 INJECTION SUBCUTANEOUS at 04:05

## 2022-05-20 RX ADMIN — HYDRALAZINE HYDROCHLORIDE 5 MG: 20 INJECTION INTRAMUSCULAR; INTRAVENOUS at 03:05

## 2022-05-20 RX ADMIN — GABAPENTIN 300 MG: 300 CAPSULE ORAL at 09:05

## 2022-05-20 RX ADMIN — POTASSIUM BICARBONATE 50 MEQ: 977.5 TABLET, EFFERVESCENT ORAL at 01:05

## 2022-05-20 RX ADMIN — ATORVASTATIN CALCIUM 40 MG: 40 TABLET, FILM COATED ORAL at 09:05

## 2022-05-20 RX ADMIN — SODIUM CHLORIDE: 0.9 INJECTION, SOLUTION INTRAVENOUS at 03:05

## 2022-05-20 RX ADMIN — POTASSIUM CHLORIDE 40 MEQ: 1500 TABLET, EXTENDED RELEASE ORAL at 11:05

## 2022-05-20 RX ADMIN — SODIUM CHLORIDE, SODIUM LACTATE, POTASSIUM CHLORIDE, AND CALCIUM CHLORIDE 3294 ML: .6; .31; .03; .02 INJECTION, SOLUTION INTRAVENOUS at 10:05

## 2022-05-20 RX ADMIN — PIPERACILLIN SODIUM,TAZOBACTAM SODIUM 3.38 G: 3; .375 INJECTION, POWDER, FOR SOLUTION INTRAVENOUS at 04:05

## 2022-05-20 RX ADMIN — LACTOBACILLUS TAB 4 TABLET: TAB at 04:05

## 2022-05-20 NOTE — NURSING
Patient arrived on unit via bed AAO,vitals stable and wnl ,call bell in reach.All safety measures met. Instructed patient to call for assistance if needed.

## 2022-05-20 NOTE — ED PROVIDER NOTES
Encounter Date: 5/20/2022       History     Chief Complaint   Patient presents with    Fatigue     Fatigue x 1 week with hematuria x 3 days     Hematuria     72-year-old male with history of type 2 diabetes, hypertension, hyperlipidemia, hypothyroidism, GERD status post gastric bypass, kidney stones in the past, who had a UTI diagnosed on 05/13 and was admitted until the 15th at [facility] and since going home has not improved.  He has been on Augmentin at home and completed therapy today.  He reports that last week he had weakness and ended up sliding out of a chair which prompted him to go to the hospital.  He had been found have UTI was on IV antibiotics while inpatient he believes he had a CT scan of the abdomen but does not remember the results.  He does not have a urologist.  Since going back home he reports he has become weaker and weaker to the point where today he could not even roll over in bed without his wife's assistance or lifting leg.  He is normally very active mowing 3 yd on a riding lawnmower and we eating out in the yd.  He reports no usual weakness shortness of breath or fatigue with these exertional activities.  Last night he had a fever of 100.7.  He was nauseous but no vomiting.  He reports he developed diarrhea while in the hospital and has continued to have diarrhea that is green on a daily basis.  He reports he is also feeling chills but not having diaphoresis.  He reports no abdominal pain.  He does have dysuria and has had hematuria for the past 2 days.  With associated urgency.  He reports he takes no blood thinners and is not even taking aspirin.  Wife reports he has issues with some incontinence but has been having urge incontinence due to inability to get to the restroom quick enough while having this UTI.  Wife reports he has not had any confusion but has been slow to answer questions        Review of patient's allergies indicates:   Allergen Reactions    Glipizide Hives      Past Medical History:   Diagnosis Date    Allergy     Diabetes mellitus, type 2     GERD (gastroesophageal reflux disease)     Hyperlipidemia     Hypertension     Hyperthyroidism      Past Surgical History:   Procedure Laterality Date    COLONOSCOPY      GASTRIC BYPASS N/A      Family History   Problem Relation Age of Onset    Hyperlipidemia Mother     Stroke Mother     Heart disease Father      Social History     Tobacco Use    Smoking status: Former Smoker    Smokeless tobacco: Never Used     Review of Systems   Constitutional: Negative for activity change, appetite change, chills, diaphoresis, fatigue and fever.   HENT: Negative for congestion, postnasal drip and rhinorrhea.    Respiratory: Negative for cough, chest tightness and shortness of breath.    Cardiovascular: Negative for chest pain and palpitations.   Gastrointestinal: Positive for diarrhea and nausea. Negative for abdominal pain, constipation and vomiting.   Genitourinary: Positive for dysuria, frequency, hematuria and urgency. Negative for decreased urine volume and flank pain.   Musculoskeletal: Negative for myalgias and neck pain.   Skin: Negative for pallor.   Allergic/Immunologic: Positive for immunocompromised state.   Neurological: Positive for weakness and headaches. Negative for dizziness, light-headedness and numbness.   Psychiatric/Behavioral: Negative for agitation, behavioral problems and confusion.   All other systems reviewed and are negative.      Physical Exam     Initial Vitals [05/20/22 0901]   BP Pulse Resp Temp SpO2   (!) 142/65 (!) 57 18 98.7 °F (37.1 °C) 96 %      MAP       --         Physical Exam    Nursing note and vitals reviewed.  Constitutional: He appears well-developed and well-nourished. He is not diaphoretic. No distress.   HENT:   Head: Normocephalic and atraumatic.   Right Ear: External ear normal.   Left Ear: External ear normal.   Nose: Nose normal.   Mouth/Throat: Oropharynx is clear and moist.    Eyes: Conjunctivae and EOM are normal. Pupils are equal, round, and reactive to light.   Neck: Neck supple. No tracheal deviation present.   Normal range of motion.  Cardiovascular: Normal rate, regular rhythm, normal heart sounds and intact distal pulses. Exam reveals no gallop and no friction rub.    No murmur heard.  Blood pressure 142/65 pulse 59   Pulmonary/Chest: Breath sounds normal. No stridor. No respiratory distress. He has no wheezes. He has no rhonchi. He has no rales. He exhibits no tenderness.   Sats 95% on room air respirations 18 clear breath sounds bilaterally   Abdominal: Abdomen is soft. Bowel sounds are normal. He exhibits no distension and no mass. There is no abdominal tenderness. There is no rebound and no guarding.   Musculoskeletal:         General: No edema. Normal range of motion.      Cervical back: Normal range of motion and neck supple.     Neurological: He is alert and oriented to person, place, and time. He has normal strength. No cranial nerve deficit or sensory deficit.   Skin: Skin is warm and dry. No rash noted. No erythema. No pallor.   Psychiatric: He has a normal mood and affect. His behavior is normal. Judgment and thought content normal.         ED Course   Procedures  Labs Reviewed   CBC W/ AUTO DIFFERENTIAL - Abnormal; Notable for the following components:       Result Value    Hemoglobin 12.4 (*)     Hematocrit 38.0 (*)     MCV 81 (*)     MCH 26.3 (*)     RDW 14.6 (*)     Immature Granulocytes 1.1 (*)     Immature Grans (Abs) 0.10 (*)     Lymph % 17.3 (*)     All other components within normal limits   COMPREHENSIVE METABOLIC PANEL - Abnormal; Notable for the following components:    Sodium 131 (*)     Potassium 2.8 (*)     CO2 22 (*)     Calcium 8.2 (*)     Albumin 3.4 (*)     All other components within normal limits    Narrative:     Potassium critical result(s) repeated. Called and verbal readback   obtained from Norma Lauren RN ER.  by CW1 05/20/2022  12:40  Potassium critical result(s) repeated. Called and verbal readback   obtained from Norma Lauren RN ER.  by CW1 05/20/2022 12:38   PROTIME-INR - Abnormal; Notable for the following components:    PT 13.8 (*)     All other components within normal limits   URINALYSIS, REFLEX TO URINE CULTURE - Abnormal; Notable for the following components:    Protein, UA 2+ (*)     Glucose, UA 4+ (*)     Ketones, UA 2+ (*)     Occult Blood UA 2+ (*)     All other components within normal limits    Narrative:     Specimen Source->Urine   URINALYSIS MICROSCOPIC - Abnormal; Notable for the following components:    RBC, UA 28 (*)     Hyaline Casts, UA 3 (*)     All other components within normal limits    Narrative:     Specimen Source->Urine   CULTURE, BLOOD   CULTURE, BLOOD   B-TYPE NATRIURETIC PEPTIDE   TROPONIN I   LACTIC ACID, PLASMA   INFLUENZA A AND B ANTIGEN    Narrative:     Specimen Source->Nasopharyngeal Swab   MAGNESIUM   PROCALCITONIN   SARS-COV-2 RNA AMPLIFICATION, QUAL   LACTIC ACID, PLASMA     EKG Readings: (Independently Interpreted)   Initial Reading: No STEMI. Rhythm: Sinus Bradycardia. Heart Rate: 58. Ectopy: No Ectopy. Conduction: incomplete LBBB      ECG Results          EKG 12-lead (In process)  Result time 05/20/22 09:42:07    In process by Interface, Lab In [facility] (05/20/22 09:42:07)                 Narrative:    Test Reason : R07.9,    Vent. Rate : 058 BPM     Atrial Rate : 058 BPM     P-R Int : 194 ms          QRS Dur : 116 ms      QT Int : 442 ms       P-R-T Axes : 024 -18 002 degrees     QTc Int : 433 ms    Sinus bradycardia  Incomplete left bundle branch block  Borderline Abnormal ECG  When compared with ECG of 28-JUL-1994 09:20,  Incomplete left bundle branch block has replaced Nonspecific  intraventricular conduction delay  Nonspecific T wave abnormality now evident in Inferior leads  Nonspecific T wave abnormality, improved in Lateral leads    Referred By: [email or web address]   SELF           Confirmed By:                              Imaging Results          X-Ray Chest AP Portable (Final result)  Result time 05/20/22 09:36:31    Final result by Martinez Berger MD (05/20/22 09:36:31)                 Narrative:    HISTORY: Fatigue  hematuria.    FINDINGS: Portable chest radiograph at 0922 hours compared to 04/14/2015 shows the cardiomediastinal silhouette and pulmonary vasculature are within normal limits.    The lungs are normally expanded, with no consolidation, large pleural effusion, or evidence of pulmonary edema. No confluent infiltrates or pneumothorax. There are no significant osseous abnormalities.    IMPRESSION: No evidence of active cardiopulmonary disease.    Electronically signed by:  Martinez Berger MD  5/20/2022 9:36 AM CDT Workstation: [id number]                            X-Rays:   Independently Interpreted Readings:   Chest X-Ray: Normal heart size.  No infiltrates.  No acute abnormalities.     Medications   vancomycin - pharmacy to dose (has no administration in time range)   vancomycin in dextrose 5 % 1 gram/250 mL IVPB 1,000 mg (has no administration in time range)     Followed by   vancomycin in dextrose 5 % 1 gram/250 mL IVPB 1,000 mg (has no administration in time range)   potassium bicarbonate disintegrating tablet 50 mEq (has no administration in time range)   lactated ringers bolus 3,294 mL (3,294 mLs Intravenous New Bag 5/20/22 1043)   cefepime in dextrose 5 % IVPB 2 g (0 g Intravenous Stopped 5/20/22 1300)     Medical Decision Making:   Independently Interpreted Test(s):   I have ordered and independently interpreted X-rays - see prior notes.  I have ordered and independently interpreted EKG Reading(s) - see prior notes  Clinical Tests:   Lab Tests: Ordered and Reviewed  The following lab test(s) were unremarkable: Lactate, BNP and Troponin       <> Summary of Lab: Urine with 2+ protein 4+ glucose 2+ ketones 2+ blood urine micro with 20 red blood cells 2 white blood cells no bacteria no  yeast  Flu and COVID negative  Negative lactate  White count 9.17 ANC 6.5  H&H 12.4 and 38  Potassium 2.8 sodium 131 bicarb 22 calcium 8.2 albumin 3.4, Mag 1.8  PT 13.8 INR 1.1  Blood cultures pending x2  Radiological Study: Ordered and Reviewed  Medical Tests: Ordered and Reviewed  ED Management:  72-year-old male with history of type 2 diabetes, hypertension, hyperlipidemia, hypothyroidism, GERD status post gastric bypass, kidney stones in the past, who had a UTI diagnosed on 05/13 and was admitted until the 15th at [facility] and since going home has not improved.  He has been on Augmentin at home and completed therapy today.  He reports that last week he had weakness and ended up sliding out of a chair which prompted him to go to the hospital.  He had been found have UTI was on IV antibiotics while inpatient he believes he had a CT scan of the abdomen but does not remember the results.  He does not have a urologist.  Since going back home he reports he has become weaker and weaker to the point where today he could not even roll over in bed without his wife's assistance or lifting leg.  He is normally very active mowing 3 yd on a riding lawnmower and we eating out in the yd.  He reports no usual weakness shortness of breath or fatigue with these exertional activities.  Last night he had a fever of 100.7.  He was nauseous but no vomiting.  He reports he developed diarrhea while in the hospital and has continued to have diarrhea that is green on a daily basis.  He reports he is also feeling chills but not having diaphoresis.  He reports no abdominal pain.  He does have dysuria and has had hematuria for the past 2 days.  With associated urgency.  He reports he takes no blood thinners and is not even taking aspirin.  Wife reports he has issues with some incontinence but has been having urge incontinence due to inability to get to the restroom quick enough while having this UTI.  On physical exam patient is  laying in bed in no distress blood pressure 142/65 pulse 59 afebrile 98.7 respirations 18 sats 95% on room air clear breath sounds bilaterally.  Normal cardiac exam soft nontender abdomen.  GCS of 15.  Patient is generally weak.  Full septic workup has been ordered due to progressively worsening weakness to the point were patient can not even sit up or roll over on his own with slow ability to answer questions, and known UTI last week with ongoing symptoms having failed outpatient antibiotics with Augmentin.  Blood cultures x2 lactate x2 in all labs are pending.  I have written for lactated Ringer's at a 30 milliliter/kilogram IV bolus.  Patient has no cardiac history.  And have written for IV vancomycin and cefepime 2 g. Patient will require admission to the hospital.  Destiney Suarez M.D.  10:45 AM 5/20/2022\  Patient's labs have returned he has a normal white count, normal ANC, normal lactate.  Urinalysis showed 2+ blood with 28 red blood cells but no signs UTI.  Flu and COVID were negative.  Patient does have electrolyte abnormalities which may be contributing to the weakness including potassium 2.8.  This will be replaced orally and IV.  He will be admitted to Hospital Medicine for further evaluation of his weakness.  Destiney Suarez M.D.  1:13 PM 5/20/2022      Other:   I have discussed this case with another health care provider.                      Clinical Impression:   Final diagnoses:  [R07.9] Chest pain  [R53.1] Weakness  [S30.1XXA] Contusion of abdominal wall, initial encounter  [E87.6] Hypokalemia (Primary)  [R31.9] Hematuria, unspecified type  [D64.9] Anemia, unspecified type          ED Disposition Condition    Admit               Destiney Suarez MD  05/20/22 1251

## 2022-05-20 NOTE — H&P
[facility] Medicine   History & Physical   Patient Name: John Martini  MRN: [id number]  Admission Date: 5/20/2022  8:42 AM  Attending Physician: Melissa Zavaleta MD  Primary Care Provider: Eric Freeman MD  Face-to-Face encounter date: 05/20/2022    Patient information was obtained from patient, past medical records, ER physician, and ER records.     HISTORY OF PRESENT ILLNESS:     John Martini is a 72 y.o. White male   With PMH of gastric bypass,  DM2, HTN, HLD,  who presents with generalized weakness.    He was admitted to [place] for UTI from 5/13 to 5/15  He had generalized weakness before admission  He continues with physical deconditioning afterwards  He was discharged on augmentin  He now has diarrhea by report  However he hasn't had a BM in the ER yet today    He reports fever at home, Tmax 100.7  No fever in ER so far; no leukocytosis on CBC today  +nausea without vomiting  No dysuria (though he reported it to ER physician)  No abdominal pain  No diaphoresis  No SOB  No CP  Pt came to ER and got admitted    REVIEW OF SYSTEMS:     All systems reviewed and are negative except as noted per above.    PAST MEDICAL HISTORY:     Past Medical History:   Diagnosis Date    Allergy     Diabetes mellitus, type 2     GERD (gastroesophageal reflux disease)     Hyperlipidemia     Hypertension     Hyperthyroidism        PAST SURGICAL HISTORY:     Past Surgical History:   Procedure Laterality Date    COLONOSCOPY      GASTRIC BYPASS N/A        ALLERGIES:   Glipizide    FAMILY HISTORY:     Family History   Problem Relation Age of Onset    Hyperlipidemia Mother     Stroke Mother     Heart disease Father        SOCIAL HISTORY:     Social History     Tobacco Use    Smoking status: Former Smoker    Smokeless tobacco: Never Used   Substance Use Topics    Alcohol use: Not on file        Social History     Substance and Sexual Activity   Sexual Activity Not on file     "    HOME MEDICATIONS:     Prior to Admission medications    Medication Sig Start Date End Date Taking? Authorizing Provider   amLODIPine (NORVASC) 5 MG tablet TAKE 1 TABLET DAILY 11/16/21  Yes Eric Freeman MD   amoxicillin-clavulanate 875-125mg (AUGMENTIN) 875-125 mg per tablet Take 1 tablet by mouth 2 (two) times daily. 5/15/22  Yes Historical Provider   atorvastatin (LIPITOR) 40 MG tablet TAKE 1 TABLET AT BEDTIME 10/26/21  Yes Eric Freeman MD   empagliflozin-linagliptin (GLYXAMBI) 25-5 mg Tab TAKE 1 TABLET EVERY MORNING 1/10/22  Yes Eric Freeman MD   gabapentin (NEURONTIN) 300 MG capsule TAKE 1 CAPSULE IN THE MORNING AND 2 CAPSULES AT BEDTIME 11/4/21  Yes Eric Freeman MD   irbesartan (AVAPRO) 150 MG tablet TAKE 1 TABLET DAILY 10/26/21  Yes Eric Freeman MD   levothyroxine (SYNTHROID) 137 MCG Tab tablet Take 2 tablets by mouth on an empty stomach every Sunday 12/23/21  Yes Eric Freeman MD   loratadine (CLARITIN) 10 mg tablet Take 10 mg by mouth once daily.   Yes Historical Provider   metFORMIN (GLUCOPHAGE-XR) 500 MG ER 24hr tablet TAKE 3 TABLETS EVERY EVENING 10/26/21  Yes Eric Freeman MD   pantoprazole (PROTONIX) 40 MG tablet TAKE 1 TABLET DAILY 10/26/21  Yes Eric Freeman MD   TRESIBA FLEXTOUCH U-200 200 unit/mL (3 mL) insulin pen INJECT 26 UNITS UNDER THE SKIN AT BEDTIME  Patient taking differently: Inject 34 Units into the skin every evening. INJECT 26 UNITS UNDER THE SKIN AT BEDTIME 1/3/22  Yes Eric Freeman MD   blood sugar diagnostic (ONETOUCH VERIO TEST STRIPS) Strp USE 1 STRIP VIA METER TWICE A DAY AS DIRECTED 4/27/22   Eric Freeman MD   multivitamin (MULTIPLE VITAMINS DAILY ORAL) Take 1 tablet by mouth once daily.    Historical Provider   pen needle, diabetic (BD ULTRA-FINE SHORT PEN NEEDLE) 31 gauge x 5/16" Ndle Use to administer Tresiba every night 5/17/22   Eric KELLER" "MD Pete   ammonium lactate (LAC-HYDRIN) 12 % lotion  1/31/21 5/20/22  Historical Provider   aspirin (ECOTRIN) 81 MG EC tablet  12/28/20 5/20/22  Historical Provider   calcium-vitamin D3-vitamin K 650 mg-12.5 mcg-40 mcg Chew Take 1 tablet by mouth.  5/20/22  Historical Provider   ferrous sulfate (FEOSOL) 325 mg (65 mg iron) Tab tablet Take 325 mg by mouth.  5/20/22  Historical Provider   gabapentin (NEURONTIN) 300 MG capsule Take 600 mg by mouth every evening.  5/20/22  Historical Provider   mupirocin (BACTROBAN) 2 % ointment  12/11/20 5/20/22  Historical Provider       PHYSICAL EXAM:     BP (!) 165/76   Pulse 62   Temp 98.7 °F (37.1 °C) (Oral)   Resp 18   Ht 6' 2" (1.88 m)   Wt 109.8 kg (242 lb)   SpO2 95%   BMI 31.07 kg/m²     Gen: alert, responsive  HEENT:  Eyes - no pallor  External ears with no lesions  Nares patent  Mouth - lips chapped  CV: RRR  Lungs: CTA B/L  Abd: +BS, soft, NT, ND  Ext: no atrophy or edema  Skin: warm, dry  Neuro: grossly intact  Psych: pleasant     LABS AND IMAGING:     Labs Reviewed   CBC W/ AUTO DIFFERENTIAL - Abnormal; Notable for the following components:       Result Value    Hemoglobin 12.4 (*)     Hematocrit 38.0 (*)     MCV 81 (*)     MCH 26.3 (*)     RDW 14.6 (*)     Immature Granulocytes 1.1 (*)     Immature Grans (Abs) 0.10 (*)     Lymph % 17.3 (*)     All other components within normal limits   COMPREHENSIVE METABOLIC PANEL - Abnormal; Notable for the following components:    Sodium 131 (*)     Potassium 2.8 (*)     CO2 22 (*)     Calcium 8.2 (*)     Albumin 3.4 (*)     All other components within normal limits    Narrative:     Potassium critical result(s) repeated. Called and verbal readback   obtained from Norma Lauren RN ER.  by [facility] 05/20/2022 12:40  Potassium critical result(s) repeated. Called and verbal readback   obtained from Norma Lauren RN ER.  by [facility] 05/20/2022 12:38   PROTIME-INR - Abnormal; Notable for the following components:    PT 13.8 (*)  "    All other components within normal limits   URINALYSIS, REFLEX TO URINE CULTURE - Abnormal; Notable for the following components:    Protein, UA 2+ (*)     Glucose, UA 4+ (*)     Ketones, UA 2+ (*)     Occult Blood UA 2+ (*)     All other components within normal limits    Narrative:     Specimen Source->Urine   URINALYSIS MICROSCOPIC - Abnormal; Notable for the following components:    RBC, UA 28 (*)     Hyaline Casts, UA 3 (*)     All other components within normal limits    Narrative:     Specimen Source->Urine   CULTURE, BLOOD   CULTURE, BLOOD   CLOSTRIDIUM DIFFICILE   CULTURE, STOOL   B-TYPE NATRIURETIC PEPTIDE   TROPONIN I   LACTIC ACID, PLASMA   INFLUENZA A AND B ANTIGEN    Narrative:     Specimen Source->Nasopharyngeal Swab   MAGNESIUM   PROCALCITONIN   SARS-COV-2 RNA AMPLIFICATION, QUAL   OCCULT BLOOD X 1, STOOL   WBC, STOOL   STOOL EXAM-OVA,CYSTS,PARASITES   TROPONIN I   TSH     Imaging Results          X-Ray Chest AP Portable (Final result)  Result time 05/20/22 09:36:31    Final result by Martinez Berger MD (05/20/22 09:36:31)                 Narrative:    HISTORY: Fatigue  hematuria.    FINDINGS: Portable chest radiograph at 0922 hours compared to 04/14/2015 shows the cardiomediastinal silhouette and pulmonary vasculature are within normal limits.    The lungs are normally expanded, with no consolidation, large pleural effusion, or evidence of pulmonary edema. No confluent infiltrates or pneumothorax. There are no significant osseous abnormalities.    IMPRESSION: No evidence of active cardiopulmonary disease.    Electronically signed by:  Martinez Berger MD  5/20/2022 9:36 AM CDT Workstation: [id number]                                ASSESSMENT & PLAN:   John Martini is a 72 y.o. male admitted for    Active Hospital Problems    Diagnosis  POA    *Hypokalemia [E87.6]  Yes    Physical deconditioning [R53.81]  Yes    Dehydration [E86.0]  Yes    Generalized weakness [R53.1]  Yes    Functional  diarrhea, after antibiotic usage [K59.1]  Yes    DM2 (diabetes mellitus, type 2) [E11.9]  Yes    GERD (gastroesophageal reflux disease) [K21.9]  Yes    HTN (hypertension) [I10]  Yes    Hyperthyroidism [E05.90]  Yes      Resolved Hospital Problems   No resolved problems to display.        Plan    Diarrhea  Hypokalemia  Complicated by antibiotic usage (augmentin)  Complicated by previous gastric bypass  - IVFs  - infection workup, including stool studies, BCx  - c.diff testing  - empiric zosyn, de-escalate prn  - probiotics  - replace potassium  - trending BMP    DM2  - basal insulin  - SSI    Physical Deconditioning  - PT/OT    Chronic conditions as noted above/below; home medications reviewed personally by me and restarted as appropriate  Electrolyte derangement:  Trending BMP; Mg; replacement prn  DVT ppx: lovenox  FULL CODE    Melissa Zavaleta MD  [facility] Hospitalist  05/20/2022

## 2022-05-20 NOTE — ED NOTES
Bed: 07  Expected date:   Expected time:   Means of arrival:   Comments:  EMS GENERALIZED WEAKNESS

## 2022-05-21 LAB
ALBUMIN SERPL BCP-MCNC: 3 G/DL (ref 3.5–5.2)
ALP SERPL-CCNC: 52 U/L (ref 55–135)
ALT SERPL W/O P-5'-P-CCNC: 19 U/L (ref 10–44)
ANION GAP SERPL CALC-SCNC: 10 MMOL/L (ref 8–16)
AST SERPL-CCNC: 22 U/L (ref 10–40)
BASOPHILS # BLD AUTO: 0.11 K/UL (ref 0–0.2)
BASOPHILS NFR BLD: 1.4 % (ref 0–1.9)
BILIRUB SERPL-MCNC: 1 MG/DL (ref 0.1–1)
BUN SERPL-MCNC: 10 MG/DL (ref 8–23)
C DIFF GDH STL QL: NEGATIVE
C DIFF TOX A+B STL QL IA: NEGATIVE
CALCIUM SERPL-MCNC: 8.2 MG/DL (ref 8.7–10.5)
CHLORIDE SERPL-SCNC: 97 MMOL/L (ref 95–110)
CO2 SERPL-SCNC: 25 MMOL/L (ref 23–29)
CREAT SERPL-MCNC: 0.9 MG/DL (ref 0.5–1.4)
DIFFERENTIAL METHOD: ABNORMAL
EOSINOPHIL # BLD AUTO: 0.1 K/UL (ref 0–0.5)
EOSINOPHIL NFR BLD: 1.8 % (ref 0–8)
ERYTHROCYTE [DISTWIDTH] IN BLOOD BY AUTOMATED COUNT: 14.7 % (ref 11.5–14.5)
EST. GFR  (AFRICAN AMERICAN): >60 ML/MIN/1.73 M^2
EST. GFR  (NON AFRICAN AMERICAN): >60 ML/MIN/1.73 M^2
ESTIMATED AVG GLUCOSE: 197 MG/DL (ref 68–131)
FOLATE SERPL-MCNC: >24.8 NG/ML (ref 4–24)
GLUCOSE SERPL-MCNC: 109 MG/DL (ref 70–110)
GLUCOSE SERPL-MCNC: 145 MG/DL (ref 70–110)
GLUCOSE SERPL-MCNC: 176 MG/DL (ref 70–110)
GLUCOSE SERPL-MCNC: 71 MG/DL (ref 70–110)
GLUCOSE SERPL-MCNC: 75 MG/DL (ref 70–110)
HBA1C MFR BLD: 8.5 % (ref 4.5–6.2)
HCT VFR BLD AUTO: 36.9 % (ref 40–54)
HGB BLD-MCNC: 12 G/DL (ref 14–18)
IMM GRANULOCYTES # BLD AUTO: 0.11 K/UL (ref 0–0.04)
IMM GRANULOCYTES NFR BLD AUTO: 1.4 % (ref 0–0.5)
IRON SERPL-MCNC: 30 UG/DL (ref 45–160)
LYMPHOCYTES # BLD AUTO: 1.8 K/UL (ref 1–4.8)
LYMPHOCYTES NFR BLD: 22.6 % (ref 18–48)
MAGNESIUM SERPL-MCNC: 1.8 MG/DL (ref 1.6–2.6)
MCH RBC QN AUTO: 26.4 PG (ref 27–31)
MCHC RBC AUTO-ENTMCNC: 32.5 G/DL (ref 32–36)
MCV RBC AUTO: 81 FL (ref 82–98)
MONOCYTES # BLD AUTO: 1 K/UL (ref 0.3–1)
MONOCYTES NFR BLD: 12.9 % (ref 4–15)
NEUTROPHILS # BLD AUTO: 4.7 K/UL (ref 1.8–7.7)
NEUTROPHILS NFR BLD: 59.9 % (ref 38–73)
NRBC BLD-RTO: 0 /100 WBC
OB PNL STL: POSITIVE
PLATELET # BLD AUTO: 294 K/UL (ref 150–450)
PMV BLD AUTO: 11.1 FL (ref 9.2–12.9)
POTASSIUM SERPL-SCNC: 3.5 MMOL/L (ref 3.5–5.1)
PROT SERPL-MCNC: 6.3 G/DL (ref 6–8.4)
RBC # BLD AUTO: 4.54 M/UL (ref 4.6–6.2)
SATURATED IRON: 11 % (ref 20–50)
SODIUM SERPL-SCNC: 132 MMOL/L (ref 136–145)
TOTAL IRON BINDING CAPACITY: 262 UG/DL (ref 250–450)
TRANSFERRIN SERPL-MCNC: 187 MG/DL (ref 200–375)
VIT B12 SERPL-MCNC: 466 PG/ML (ref 210–950)
WBC # BLD AUTO: 7.82 K/UL (ref 3.9–12.7)
WBC #/AREA STL HPF: NORMAL /[HPF]

## 2022-05-21 PROCEDURE — 96367 TX/PROPH/DG ADDL SEQ IV INF: CPT

## 2022-05-21 PROCEDURE — 25000003 PHARM REV CODE 250: Performed by: STUDENT IN AN ORGANIZED HEALTH CARE EDUCATION/TRAINING PROGRAM

## 2022-05-21 PROCEDURE — 99900031 HC PATIENT EDUCATION (STAT)

## 2022-05-21 PROCEDURE — 99900035 HC TECH TIME PER 15 MIN (STAT)

## 2022-05-21 PROCEDURE — 96366 THER/PROPH/DIAG IV INF ADDON: CPT

## 2022-05-21 PROCEDURE — 89055 LEUKOCYTE ASSESSMENT FECAL: CPT | Performed by: FAMILY MEDICINE

## 2022-05-21 PROCEDURE — 94799 UNLISTED PULMONARY SVC/PX: CPT

## 2022-05-21 PROCEDURE — 63600175 PHARM REV CODE 636 W HCPCS: Performed by: FAMILY MEDICINE

## 2022-05-21 PROCEDURE — 94761 N-INVAS EAR/PLS OXIMETRY MLT: CPT

## 2022-05-21 PROCEDURE — 97116 GAIT TRAINING THERAPY: CPT

## 2022-05-21 PROCEDURE — 97165 OT EVAL LOW COMPLEX 30 MIN: CPT

## 2022-05-21 PROCEDURE — 80053 COMPREHEN METABOLIC PANEL: CPT | Performed by: FAMILY MEDICINE

## 2022-05-21 PROCEDURE — 85025 COMPLETE CBC W/AUTO DIFF WBC: CPT | Performed by: FAMILY MEDICINE

## 2022-05-21 PROCEDURE — 97161 PT EVAL LOW COMPLEX 20 MIN: CPT

## 2022-05-21 PROCEDURE — 25000003 PHARM REV CODE 250: Performed by: FAMILY MEDICINE

## 2022-05-21 PROCEDURE — 36415 COLL VENOUS BLD VENIPUNCTURE: CPT | Performed by: STUDENT IN AN ORGANIZED HEALTH CARE EDUCATION/TRAINING PROGRAM

## 2022-05-21 PROCEDURE — 82746 ASSAY OF FOLIC ACID SERUM: CPT | Performed by: STUDENT IN AN ORGANIZED HEALTH CARE EDUCATION/TRAINING PROGRAM

## 2022-05-21 PROCEDURE — 63600175 PHARM REV CODE 636 W HCPCS: Performed by: STUDENT IN AN ORGANIZED HEALTH CARE EDUCATION/TRAINING PROGRAM

## 2022-05-21 PROCEDURE — G0378 HOSPITAL OBSERVATION PER HR: HCPCS

## 2022-05-21 PROCEDURE — 87177 OVA AND PARASITES SMEARS: CPT | Performed by: FAMILY MEDICINE

## 2022-05-21 PROCEDURE — 36415 COLL VENOUS BLD VENIPUNCTURE: CPT | Performed by: FAMILY MEDICINE

## 2022-05-21 PROCEDURE — 96361 HYDRATE IV INFUSION ADD-ON: CPT

## 2022-05-21 PROCEDURE — 87045 FECES CULTURE AEROBIC BACT: CPT | Performed by: FAMILY MEDICINE

## 2022-05-21 PROCEDURE — C9399 UNCLASSIFIED DRUGS OR BIOLOG: HCPCS | Performed by: FAMILY MEDICINE

## 2022-05-21 PROCEDURE — 82272 OCCULT BLD FECES 1-3 TESTS: CPT | Performed by: FAMILY MEDICINE

## 2022-05-21 PROCEDURE — 82607 VITAMIN B-12: CPT | Performed by: STUDENT IN AN ORGANIZED HEALTH CARE EDUCATION/TRAINING PROGRAM

## 2022-05-21 PROCEDURE — 96372 THER/PROPH/DIAG INJ SC/IM: CPT | Mod: 59 | Performed by: FAMILY MEDICINE

## 2022-05-21 PROCEDURE — 84466 ASSAY OF TRANSFERRIN: CPT | Performed by: STUDENT IN AN ORGANIZED HEALTH CARE EDUCATION/TRAINING PROGRAM

## 2022-05-21 PROCEDURE — 87209 SMEAR COMPLEX STAIN: CPT | Performed by: FAMILY MEDICINE

## 2022-05-21 PROCEDURE — 87449 NOS EACH ORGANISM AG IA: CPT | Performed by: FAMILY MEDICINE

## 2022-05-21 PROCEDURE — 87046 STOOL CULTR AEROBIC BACT EA: CPT | Mod: 59 | Performed by: FAMILY MEDICINE

## 2022-05-21 PROCEDURE — 83735 ASSAY OF MAGNESIUM: CPT | Performed by: FAMILY MEDICINE

## 2022-05-21 PROCEDURE — 97530 THERAPEUTIC ACTIVITIES: CPT

## 2022-05-21 RX ORDER — LOPERAMIDE HYDROCHLORIDE 2 MG/1
2 CAPSULE ORAL 4 TIMES DAILY PRN
Status: DISCONTINUED | OUTPATIENT
Start: 2022-05-21 | End: 2022-05-25 | Stop reason: HOSPADM

## 2022-05-21 RX ADMIN — PIPERACILLIN SODIUM,TAZOBACTAM SODIUM 3.38 G: 3; .375 INJECTION, POWDER, FOR SOLUTION INTRAVENOUS at 09:05

## 2022-05-21 RX ADMIN — PIPERACILLIN SODIUM,TAZOBACTAM SODIUM 3.38 G: 3; .375 INJECTION, POWDER, FOR SOLUTION INTRAVENOUS at 02:05

## 2022-05-21 RX ADMIN — GABAPENTIN 300 MG: 300 CAPSULE ORAL at 09:05

## 2022-05-21 RX ADMIN — INSULIN DETEMIR 15 UNITS: 100 INJECTION, SOLUTION SUBCUTANEOUS at 10:05

## 2022-05-21 RX ADMIN — LACTOBACILLUS TAB 4 TABLET: TAB at 05:05

## 2022-05-21 RX ADMIN — GABAPENTIN 300 MG: 300 CAPSULE ORAL at 08:05

## 2022-05-21 RX ADMIN — ENOXAPARIN SODIUM 40 MG: 40 INJECTION SUBCUTANEOUS at 05:05

## 2022-05-21 RX ADMIN — PIPERACILLIN SODIUM,TAZOBACTAM SODIUM 3.38 G: 3; .375 INJECTION, POWDER, FOR SOLUTION INTRAVENOUS at 05:05

## 2022-05-21 RX ADMIN — LACTOBACILLUS TAB 4 TABLET: TAB at 09:05

## 2022-05-21 RX ADMIN — PANTOPRAZOLE SODIUM 40 MG: 40 TABLET, DELAYED RELEASE ORAL at 05:05

## 2022-05-21 RX ADMIN — AMLODIPINE BESYLATE 5 MG: 5 TABLET ORAL at 09:05

## 2022-05-21 RX ADMIN — ATORVASTATIN CALCIUM 40 MG: 40 TABLET, FILM COATED ORAL at 08:05

## 2022-05-21 RX ADMIN — LACTOBACILLUS TAB 4 TABLET: TAB at 12:05

## 2022-05-21 RX ADMIN — SODIUM CHLORIDE 125 MG: 9 INJECTION, SOLUTION INTRAVENOUS at 01:05

## 2022-05-21 NOTE — PT/OT/SLP EVAL
Physical Therapy Evaluation    Patient Name:  John Martini   MRN:  [id number]    Recommendations:     Discharge Recommendations:  home with home health, home health PT, rehabilitation facility (depending on patient functional level at time of discharge)   Discharge Equipment Recommendations: walker, rolling   Barriers to discharge: None    Assessment:     John Martini is a 72 y.o. male admitted with a medical diagnosis of Hypokalemia.  He presents with the following impairments/functional limitations:  weakness, impaired endurance, impaired functional mobilty, gait instability, impaired balance, decreased lower extremity function, decreased ROM . Patient agreeable to PT evaluation this morning but reported feeling dizzy every time attempted to ambulate. Patient presented supine in bed and required min assist to transfer to sitting and then min assist to stand.  Patient then able to ambulate 3 x 4 feet with RW with CGA but got dizzy each times so had to sit down.    Rehab Prognosis: Good; patient would benefit from acute skilled PT services to address these deficits and reach maximum level of function.    Recent Surgery: * No surgery found *      Plan:     During this hospitalization, patient to be seen daily to address the identified rehab impairments via gait training, therapeutic activities, therapeutic exercises and progress toward the following goals:    · Plan of Care Expires:  06/24/22    Subjective     Chief Complaint: feeling dizzy  Patient/Family Comments/goals: get better and safer  Pain/Comfort:  ·      Patients cultural, spiritual, Anabaptism conflicts given the current situation:      Living Environment:  Currently lives with spouse in 1 story home.    Prior to admission, patients level of function was modified independent.  Equipment used at home: none.  DME owned (not currently used): none.  Upon discharge, patient will have assistance from family.    Objective:     Communicated with  nurse prior to session.  Patient found supine with bed alarm  upon PT entry to room.    General Precautions: Standard, fall, special contact   Orthopedic Precautions:N/A   Braces:    Respiratory Status: Room air    Exams:  · RLE ROM: WFL  · RLE Strength: Deficits: 4/5 overall  · LLE ROM: WFL  · LLE Strength: Deficits: 4/5 overall    Functional Mobility:  · Bed Mobility:     · Supine to Sit: minimum assistance  · Transfers:     · Sit to Stand:  minimum assistance with rolling walker  · Gait: 3 x 4 feet with RW with CGA    Therapeutic Activities and Exercises:   gait training 3 x 4 feet RW CGA but had to stop each time due to getting dizzy    AM-PAC 6 CLICK MOBILITY  Total Score:18     Patient left up in chair with call button in reach, chair alarm on and nurse notified.    GOALS:   Multidisciplinary Problems     Physical Therapy Goals        Problem: Physical Therapy    Goal Priority Disciplines Outcome Goal Variances Interventions   Physical Therapy Goal     PT, PT/OT Ongoing, Progressing     Description: Goals to be met by: 6/3/22    Patient will increase functional independence with mobility by performin. Supine to sit with [place]  2. Sit to supine with [place]  3. Sit to stand transfer with Supervision  4. Bed to chair transfer with Stand-by Assistance using Rolling Walker  5. Gait  x 150 feet with Stand-by Assistance using Rolling Walker.                      History:     Past Medical History:   Diagnosis Date    Allergy     Diabetes mellitus, type 2     GERD (gastroesophageal reflux disease)     Hyperlipidemia     Hypertension     Hyperthyroidism        Past Surgical History:   Procedure Laterality Date    COLONOSCOPY      GASTRIC BYPASS N/A        Time Tracking:     PT Received On: 22  PT Start Time: 0855     PT Stop Time: 0920  PT Total Time (min): 25 min     Billable Minutes: Evaluation 15 and Gait Training 10      2022

## 2022-05-21 NOTE — PT/OT/SLP EVAL
Occupational Therapy   Evaluation    Name: John Martini  MRN: [id number]  Admitting Diagnosis:  Hypokalemia  Recent Surgery: * No surgery found *      Recommendations:     Discharge Recommendations: rehabilitation facility  Discharge Equipment Recommendations:   (TBD)  Barriers to discharge:  Decreased caregiver support    Assessment:     John Martini is a 72 y.o. male with a medical diagnosis of Hypokalemia.   Performance deficits affecting function: weakness, impaired endurance, impaired self care skills, impaired functional mobilty, gait instability, impaired balance.      Pt presented up in the chair; he was not dizzy and was able to walk from the chair to/from the sink with Min A; his steps were slow/shuffling; his balance is impaired requiring RW use (no DME used at baseline).  Pt reports that a few weeks ago he was very active and mowing the lawn/doing yard work.  He would like to return to this and is agreeable to inpatient rehab at d/c to reach his goals.      Rehab Prognosis: Good; patient would benefit from acute skilled OT services to address these deficits and reach maximum level of function.       Plan:     Patient to be seen 6 x/week to address the above listed problems via self-care/home management, therapeutic exercises, therapeutic activities  · Plan of Care Expires: 06/20/22  · Plan of Care Reviewed with: patient, spouse    Subjective     Chief Complaint: weakness  Patient/Family Comments/goals: return to PLOF    Occupational Profile:  Living Environment: Lives with his wife in a single story home with 2STE; walk-in shower with shower chair; elevated commode  Previous level of function: Independent; active  Roles and Routines: yard work  Equipment Used at Home:  shower chair, raised toilet  Assistance upon Discharge: wife    Pain/Comfort:  · Pain Rating 1: 0/10  · Pain Rating Post-Intervention 1: 0/10    Objective:     Communicated with: nurse prior to session.  Patient found up in  chair with telemetry upon OT entry to room.    General Precautions: Standard, fall, aspiration, diabetic   Orthopedic Precautions:N/A   Braces: N/A  Respiratory Status: Room air    Occupational Performance:    Bed Mobility:    · Patient completed Sit to Supine with minimum assistance    Functional Mobility/Transfers:  · Patient completed Sit <> Stand Transfer with minimum assistance  with  rolling walker   · Functional Mobility: Min A with RW ~10ft x 2 trials from chair to/from sink; small/shuffling steps; unstable; needs edu/cues for correct RW management    Activities of Daily Living:  · Grooming: minimum assistance for stability in standing    Cognitive/Visual Perceptual:  Cognitive/Psychosocial Skills:     -       Oriented to: Person, Place, Time and Situation   -       Follows Commands/attention:Follows multistep  commands  -       Communication: clear/fluent  -       Memory: No Deficits noted  -       Safety awareness/insight to disability: intact   -       Mood/Affect/Coping skills/emotional control: Appropriate to situation    Physical Exam:  Upper Extremity Range of Motion:     -       Right Upper Extremity: WFL  -       Left Upper Extremity: WFL  Upper Extremity Strength:    -       Right Upper Extremity: WFL  -       Left Upper Extremity: WFL   Strength:    -       Right Upper Extremity: WFL  -       Left Upper Extremity: WFL  Fine Motor Coordination:    -       Intact  Gross motor coordination:   WFL    AMPAC 6 Click ADL:  AMPAC Total Score: 16    Treatment & Education:   Therapist provided facilitation and instruction of proper body mechanics and fall prevention strategies during tasks listed above.    Instructed patient to sit in bedside chair as tolerated daily to increase OOB/activity tolerance.    Instructed patient to use call light to have nursing staff assist with needs/transfers.    Discussed OT POC and answered all questions within OT scope of practice.  Education:    Patient left HOB  elevated with all lines intact, call button in reach, bed alarm on and wife present    GOALS:   Multidisciplinary Problems     Occupational Therapy Goals        Problem: Occupational Therapy    Goal Priority Disciplines Outcome Interventions   Occupational Therapy Goal     OT, PT/OT     Description: Goals to be met by: d/c     Patient will increase functional independence with ADLs by performing:    UE Dressing with Supervision.  LE Dressing with Supervision and Assistive Devices as needed.  Grooming while standing at sink with Supervision.  Toileting from toilet with Supervision for hygiene and clothing management.   Toilet transfer to toilet with Supervision.                     History:     Past Medical History:   Diagnosis Date    Allergy     Diabetes mellitus, type 2     GERD (gastroesophageal reflux disease)     Hyperlipidemia     Hypertension     Hyperthyroidism        Past Surgical History:   Procedure Laterality Date    COLONOSCOPY      GASTRIC BYPASS N/A        Time Tracking:     OT Date of Treatment: 05/21/22  OT Start Time: 1423  OT Stop Time: 1447  OT Total Time (min): 24 min    Billable Minutes:Evaluation 10  Self Care/Home Management 05   Therapeutic Activity 08      5/21/2022

## 2022-05-21 NOTE — PLAN OF CARE
[facility]  Initial Discharge Assessment       Primary Care Provider: Eric Freeman MD    Admission Diagnosis: Hypokalemia [E87.6]    Admission Date: 5/20/2022  Expected Discharge Date: 5/21/2022    Discharge Barriers Identified: (P) None    Assessment completed at bedside with pt and his wife, Saskia Martini, [phone]. Pt lives with his wife and wants to be evaluated for home health PT if he were to be sent home. Pt is also open to rehab option if deemed medically appropriate. Pt and his wife are concerned about his mobility and safety if he were to be sent home. Pt does not currently use any DME at home but is requesting to be evaluated for a rolling walker for home use.     Pt does not have a POA/AD and is not requesting any information.     Payor: [employer] MANAGED MEDICARE / Plan: [employer] MEDICARE PLAN PPO / Product Type: Medicare Advantage /     Extended Emergency Contact Information  Primary Emergency Contact: Saskia Martini  Address: [address] [place] [address] [facility]  Home Phone: [phone]  Mobile Phone: [phone]  Relation: Spouse  Preferred language: English   needed? No    Discharge Plan A: (P) Home with family  Discharge Plan B: (P) Home Health, Rehab      [facility] - [address] [address]  Phone: [phone] Fax: [phone]    [id number] - [place] [place] - [address] AT [facility]  [address]  [place] [address]  Phone: [phone] Fax: [phone]      Initial Assessment (most recent)       Adult Discharge Assessment - 05/21/22 0945          Discharge Assessment    Assessment Type Discharge Planning Assessment (P)      Confirmed/corrected address, phone number and insurance Yes (P)      Confirmed Demographics Correct on Facesheet (P)      Source of Information patient (P)      When was your last doctors  appointment? 04/27/22 (P)    w/ PCP    Does patient/caregiver understand observation status Yes (P)      Communicated JAMES with patient/caregiver Date not available/Unable to determine (P)      Reason For Admission couldn't walk, hard to move (P)      Lives With spouse (P)      Facility Arrived From: home (P)      Do you expect to return to your current living situation? Yes (P)      Do you have help at home or someone to help you manage your care at home? Yes (P)      Who are your caregiver(s) and their phone number(s)? Saskia Martini, [phone], spouse (P)      Prior to hospitilization cognitive status: Alert/Oriented (P)      Current cognitive status: Alert/Oriented (P)      Walking or Climbing Stairs Difficulty none (P)      Dressing/Bathing Difficulty none (P)      Home Accessibility not wheelchair accessible (P)      Home Layout Able to live on 1st floor (P)      Equipment Currently Used at Home none (P)      Readmission within 30 days? No (P)      Patient currently being followed by outpatient case management? No (P)      Do you currently have service(s) that help you manage your care at home? No (P)      Do you take prescription medications? Yes (P)      Do you have prescription coverage? Yes (P)      Coverage [employer] (P)      Do you have any problems affording any of your prescribed medications? No (P)      Is the patient taking medications as prescribed? yes (P)      Who is going to help you get home at discharge? Saskia Martini, [phone], spouse (P)      How do you get to doctors appointments? car, drives self (P)      Are you on dialysis? No (P)      Do you take coumadin? No (P)      Discharge Plan A Home with family (P)      Discharge Plan B Home Health;Rehab (P)      DME Needed Upon Discharge  walker, rolling (P)      Discharge Plan discussed with: Patient;Spouse/sig other (P)      Name(s) and Number(s) Saskia Martini, [phone], spouse (P)      Discharge Barriers Identified None (P)

## 2022-05-21 NOTE — PLAN OF CARE
Problem: Physical Therapy  Goal: Physical Therapy Goal  Description: Goals to be met by: 6/3/22    Patient will increase functional independence with mobility by performin. Supine to sit with [place]  2. Sit to supine with [place]  3. Sit to stand transfer with Supervision  4. Bed to chair transfer with Stand-by Assistance using Rolling Walker  5. Gait  x 150 feet with Stand-by Assistance using Rolling Walker.     Outcome: Ongoing, Progressing

## 2022-05-21 NOTE — PLAN OF CARE
05/21/22 0848   Patient Assessment/Suction   Level of Consciousness (AVPU) alert   Respiratory Effort Unlabored   All Lung Fields Breath Sounds clear;diminished   PRE-TX-O2   O2 Device (Oxygen Therapy) room air   SpO2 96 %   Pulse Oximetry Type Intermittent   $ Pulse Oximetry - Multiple Charge Pulse Oximetry - Multiple   Pulse 63   Resp 18   Respiratory Evaluation   $ Care Plan Tech Time 15 min

## 2022-05-21 NOTE — PROGRESS NOTES
[facility]    Progress Note    Patient Name: John Martini  MRN: [id number]  Patient Class: OP- Observation   Admission Date: 5/20/2022  8:42 AM  Length of Stay: 0  Attending Physician: Joe Ibarra MD  Primary Care Provider: Eric Freeman MD  Face-to-Face encounter date: 05/21/2022  Code status:  Chief Complaint: Fatigue (Fatigue x 1 week with hematuria x 3 days ) and Hematuria        Subjective:    HPI: Jose Angel Zavaleta MD  John Martini is a 72 y.o. White male   With PMH of gastric bypass,  DM2, HTN, HLD,  who presents with generalized weakness.     He was admitted to [facility] for UTI from 5/13 to 5/15  He had generalized weakness before admission  He continues with physical deconditioning afterwards  He was discharged on augmentin  He now has diarrhea by report  However he hasn't had a BM in the ER yet today     He reports fever at home, Tmax 100.7  No fever in ER so far; no leukocytosis on CBC today  +nausea without vomiting  No dysuria (though he reported it to ER physician)  No abdominal pain  No diaphoresis  No SOB  No CP  Pt came to ER and got admitted    Interval History:   5/21:  Patient had another episode of diarrhea overnight , C diff negative occult positive.  H&H stable.  Abdominal CT to rule out intra-abdominal mass.  Patient has a history of bypass, would check B12, folate and iron levels. No concerns/issues overnight reported by the patient or the nursing staff.    Review of Systems All other Review of Systems were found to be negative expect for that mentioned already in HPI.     Objective:     Vitals:    05/21/22 0443 05/21/22 0801 05/21/22 0848 05/21/22 1148   BP: (!) 175/94 (!) 153/85  138/78   Pulse: (!) 58 (!) 55 63 61   Resp: 18 18 18 18   Temp: 98.8 °F (37.1 °C) 98.2 °F (36.8 °C)  98.1 °F (36.7 °C)   TempSrc: Oral Oral  Oral   SpO2: 95% 95% 96% 96%   Weight:       Height:            Vitals reviewed.  Constitutional: No distress.    HENT: NC  Head: Atraumatic.   Cardiovascular: Normal rate, regular rhythm and normal heart sounds.   Pulmonary/Chest: Effort normal. No wheezes.   Abdominal: Soft. Bowel sounds are normal. No distension and no mass. No tenderness  Neurological: Alert.   Skin: Skin is warm and dry.   Psych: Appropriate mood and affect    Following labs were Reviewed   CBC:  Recent Labs   Lab 05/21/22 0513   WBC 7.82   HGB 12.0*   HCT 36.9*        CMP:  Recent Labs   Lab 05/21/22 0513   CALCIUM 8.2*   ALBUMIN 3.0*   PROT 6.3   *   K 3.5   CO2 25   CL 97   BUN 10   CREATININE 0.9   ALKPHOS 52*   ALT 19   AST 22   BILITOT 1.0       Micro Results  Microbiology Results (last 7 days)     Procedure Component Value Units Date/Time    Clostridium difficile EIA [[id number]] Collected: 05/21/22 0754    Order Status: Completed Specimen: Stool Updated: 05/21/22 0929     C. diff Antigen Negative     C difficile Toxins A+B, EIA Negative     Comment: Testing not recommended for children <24 months old.       Stool culture [[id number]] Collected: 05/21/22 0754    Order Status: Sent Specimen: Stool Updated: 05/21/22 0808    Blood culture x two cultures. Draw prior to antibiotics. [[id number]] Collected: 05/20/22 1202    Order Status: Completed Specimen: Blood from Peripheral, Hand, Right Updated: 05/20/22 1917     Blood Culture, Routine No Growth to date    Narrative:      Aerobic and anaerobic    Blood culture x two cultures. Draw prior to antibiotics. [[id number]] Collected: 05/20/22 1029    Order Status: Completed Specimen: Blood from Peripheral, Upper Arm, Left Updated: 05/20/22 1717     Blood Culture, Routine No Growth to date    Narrative:      Aerobic and anaerobic           Radiology Reports  X-Ray Chest AP Portable  Result Date: 5/20/2022  IMPRESSION: No evidence of active cardiopulmonary disease. Electronically signed by:  Martinez Berger MD  5/20/2022 9:36 AM CDT Workstation: [id number]       Meds  Scheduled Meds:   amLODIPine  5  mg Oral Daily    atorvastatin  40 mg Oral Nightly    enoxaparin  40 mg Subcutaneous Daily    gabapentin  300 mg Oral BID    insulin detemir U-100  15 Units Subcutaneous Daily    Lactobacillus acidoph-L.bulgar  4 tablet Oral TID WM    pantoprazole  40 mg Oral Before breakfast    piperacillin-tazobactam (ZOSYN) IVPB  3.375 g Intravenous Q8H     Continuous Infusions:   sodium chloride 0.9% 75 mL/hr at 05/20/22 1500     PRN Meds:.acetaminophen, albuterol-ipratropium, dextrose 50 % in water (D50W), dextrose 50%, dextrose 50%, dextrose 50%, glucagon (human recombinant), glucose, glucose, hydrALAZINE, hydrALAZINE, HYDROcodone-acetaminophen, insulin aspart U-100, melatonin, naloxone, ondansetron, polyethylene glycol, senna-docusate 8.6-50 mg, simethicone, sodium chloride 0.9%.    Assessment & Plan:     Diarrhea  Hypokalemia  Complicated by antibiotic usage (augmentin)  Complicated by previous gastric bypass  - IVFs  - infection workup, including stool studies, BCx  - c.diff testing   - empiric zosyn, de-escalate prn  - probiotics  - replace potassium  - trending BMP     DM2  - basal insulin  - SSI     Physical Deconditioning  - PT/OT          Discharge Planning:   Is the patient medically ready for discharge?: no    Reason for patient still in hospital (select all that apply): Patient trending condition and Treatment    Above encounter included review of the medical records, interviewing and examining the patient face-to-face, discussion with family and other health care providers, ordering and interpreting lab/test results and formulating a plan of care.     Medical Decision Making:      [_] Low Complexity  [_] Moderate Complexity  [x] High Complexity      Joe Ibarra MD  Department of Hospital Medicine   [facility]

## 2022-05-21 NOTE — CARE UPDATE
05/20/22 1942   Patient Assessment/Suction   Level of Consciousness (AVPU) alert   Respiratory Effort Normal;Unlabored   Expansion/Accessory Muscles/Retractions no use of accessory muscles;no retractions;expansion symmetric   All Lung Fields Breath Sounds Anterior:;clear;diminished   Rhythm/Pattern, Respiratory unlabored;pattern regular;depth regular   PRE-TX-O2   O2 Device (Oxygen Therapy) room air   SpO2 97 %   Pulse Oximetry Type Intermittent   $ Pulse Oximetry - Multiple Charge Pulse Oximetry - Multiple   Aerosol Therapy   $ Aerosol Therapy Charges PRN treatment not required   Education   $ Education Bronchodilator;15 min   Respiratory Evaluation   $ Care Plan Tech Time 15 min   $ Eval/Re-eval Charges Evaluation

## 2022-05-21 NOTE — PLAN OF CARE
05/21/22 0955   URIBE Message   Medicare Outpatient and Observation Notification regarding financial responsibility Given to patient/caregiver;Explained to patient/caregiver;Signed/date by patient/caregiver   Date URIBE was signed 05/21/22   Time URIBE was signed 0950

## 2022-05-22 PROBLEM — N28.89 LEFT RENAL MASS: Status: ACTIVE | Noted: 2022-05-22

## 2022-05-22 LAB
ALBUMIN SERPL BCP-MCNC: 3 G/DL (ref 3.5–5.2)
ALP SERPL-CCNC: 60 U/L (ref 55–135)
ALT SERPL W/O P-5'-P-CCNC: 19 U/L (ref 10–44)
ANION GAP SERPL CALC-SCNC: 8 MMOL/L (ref 8–16)
AST SERPL-CCNC: 23 U/L (ref 10–40)
BASOPHILS # BLD AUTO: 0.12 K/UL (ref 0–0.2)
BASOPHILS NFR BLD: 1.6 % (ref 0–1.9)
BILIRUB SERPL-MCNC: 0.9 MG/DL (ref 0.1–1)
BUN SERPL-MCNC: 8 MG/DL (ref 8–23)
CALCIUM SERPL-MCNC: 8 MG/DL (ref 8.7–10.5)
CHLORIDE SERPL-SCNC: 95 MMOL/L (ref 95–110)
CO2 SERPL-SCNC: 26 MMOL/L (ref 23–29)
CREAT SERPL-MCNC: 0.9 MG/DL (ref 0.5–1.4)
DIFFERENTIAL METHOD: ABNORMAL
EOSINOPHIL # BLD AUTO: 0.3 K/UL (ref 0–0.5)
EOSINOPHIL NFR BLD: 3.4 % (ref 0–8)
ERYTHROCYTE [DISTWIDTH] IN BLOOD BY AUTOMATED COUNT: 14.8 % (ref 11.5–14.5)
EST. GFR  (AFRICAN AMERICAN): >60 ML/MIN/1.73 M^2
EST. GFR  (NON AFRICAN AMERICAN): >60 ML/MIN/1.73 M^2
GLUCOSE SERPL-MCNC: 113 MG/DL (ref 70–110)
GLUCOSE SERPL-MCNC: 121 MG/DL (ref 70–110)
GLUCOSE SERPL-MCNC: 144 MG/DL (ref 70–110)
GLUCOSE SERPL-MCNC: 177 MG/DL (ref 70–110)
HCT VFR BLD AUTO: 37.7 % (ref 40–54)
HGB BLD-MCNC: 12.5 G/DL (ref 14–18)
IMM GRANULOCYTES # BLD AUTO: 0.1 K/UL (ref 0–0.04)
IMM GRANULOCYTES NFR BLD AUTO: 1.3 % (ref 0–0.5)
LYMPHOCYTES # BLD AUTO: 1.4 K/UL (ref 1–4.8)
LYMPHOCYTES NFR BLD: 18.6 % (ref 18–48)
MAGNESIUM SERPL-MCNC: 1.8 MG/DL (ref 1.6–2.6)
MCH RBC QN AUTO: 26.5 PG (ref 27–31)
MCHC RBC AUTO-ENTMCNC: 33.2 G/DL (ref 32–36)
MCV RBC AUTO: 80 FL (ref 82–98)
MONOCYTES # BLD AUTO: 0.8 K/UL (ref 0.3–1)
MONOCYTES NFR BLD: 10.2 % (ref 4–15)
NEUTROPHILS # BLD AUTO: 5 K/UL (ref 1.8–7.7)
NEUTROPHILS NFR BLD: 64.9 % (ref 38–73)
NRBC BLD-RTO: 0 /100 WBC
PLATELET # BLD AUTO: 307 K/UL (ref 150–450)
PMV BLD AUTO: 10.6 FL (ref 9.2–12.9)
POTASSIUM SERPL-SCNC: 3 MMOL/L (ref 3.5–5.1)
PROT SERPL-MCNC: 6.4 G/DL (ref 6–8.4)
RBC # BLD AUTO: 4.71 M/UL (ref 4.6–6.2)
SODIUM SERPL-SCNC: 129 MMOL/L (ref 136–145)
WBC # BLD AUTO: 7.64 K/UL (ref 3.9–12.7)

## 2022-05-22 PROCEDURE — 36415 COLL VENOUS BLD VENIPUNCTURE: CPT | Performed by: FAMILY MEDICINE

## 2022-05-22 PROCEDURE — 25000003 PHARM REV CODE 250: Performed by: STUDENT IN AN ORGANIZED HEALTH CARE EDUCATION/TRAINING PROGRAM

## 2022-05-22 PROCEDURE — 94761 N-INVAS EAR/PLS OXIMETRY MLT: CPT

## 2022-05-22 PROCEDURE — 83735 ASSAY OF MAGNESIUM: CPT | Performed by: FAMILY MEDICINE

## 2022-05-22 PROCEDURE — 25500020 PHARM REV CODE 255: Performed by: STUDENT IN AN ORGANIZED HEALTH CARE EDUCATION/TRAINING PROGRAM

## 2022-05-22 PROCEDURE — 99900035 HC TECH TIME PER 15 MIN (STAT)

## 2022-05-22 PROCEDURE — 96366 THER/PROPH/DIAG IV INF ADDON: CPT

## 2022-05-22 PROCEDURE — G0378 HOSPITAL OBSERVATION PER HR: HCPCS

## 2022-05-22 PROCEDURE — 25000003 PHARM REV CODE 250: Performed by: FAMILY MEDICINE

## 2022-05-22 PROCEDURE — 96372 THER/PROPH/DIAG INJ SC/IM: CPT | Mod: 59 | Performed by: FAMILY MEDICINE

## 2022-05-22 PROCEDURE — 99900031 HC PATIENT EDUCATION (STAT)

## 2022-05-22 PROCEDURE — 96376 TX/PRO/DX INJ SAME DRUG ADON: CPT

## 2022-05-22 PROCEDURE — 85025 COMPLETE CBC W/AUTO DIFF WBC: CPT | Performed by: FAMILY MEDICINE

## 2022-05-22 PROCEDURE — A9585 GADOBUTROL INJECTION: HCPCS | Performed by: STUDENT IN AN ORGANIZED HEALTH CARE EDUCATION/TRAINING PROGRAM

## 2022-05-22 PROCEDURE — 63600175 PHARM REV CODE 636 W HCPCS: Performed by: FAMILY MEDICINE

## 2022-05-22 PROCEDURE — 97110 THERAPEUTIC EXERCISES: CPT

## 2022-05-22 PROCEDURE — 63600175 PHARM REV CODE 636 W HCPCS: Performed by: STUDENT IN AN ORGANIZED HEALTH CARE EDUCATION/TRAINING PROGRAM

## 2022-05-22 PROCEDURE — 80053 COMPREHEN METABOLIC PANEL: CPT | Performed by: FAMILY MEDICINE

## 2022-05-22 PROCEDURE — C9399 UNCLASSIFIED DRUGS OR BIOLOG: HCPCS | Performed by: FAMILY MEDICINE

## 2022-05-22 PROCEDURE — 82962 GLUCOSE BLOOD TEST: CPT

## 2022-05-22 RX ORDER — GADOBUTROL 604.72 MG/ML
7 INJECTION INTRAVENOUS
Status: COMPLETED | OUTPATIENT
Start: 2022-05-22 | End: 2022-05-22

## 2022-05-22 RX ORDER — POTASSIUM CHLORIDE 20 MEQ/1
20 TABLET, EXTENDED RELEASE ORAL
Status: DISCONTINUED | OUTPATIENT
Start: 2022-05-22 | End: 2022-05-25 | Stop reason: HOSPADM

## 2022-05-22 RX ORDER — POTASSIUM CHLORIDE 7.45 MG/ML
40 INJECTION INTRAVENOUS
Status: DISCONTINUED | OUTPATIENT
Start: 2022-05-22 | End: 2022-05-25 | Stop reason: HOSPADM

## 2022-05-22 RX ORDER — MAGNESIUM SULFATE 1 G/100ML
1 INJECTION INTRAVENOUS
Status: DISCONTINUED | OUTPATIENT
Start: 2022-05-22 | End: 2022-05-25 | Stop reason: HOSPADM

## 2022-05-22 RX ORDER — MAGNESIUM SULFATE HEPTAHYDRATE 40 MG/ML
2 INJECTION, SOLUTION INTRAVENOUS
Status: DISCONTINUED | OUTPATIENT
Start: 2022-05-22 | End: 2022-05-25 | Stop reason: HOSPADM

## 2022-05-22 RX ORDER — POTASSIUM CHLORIDE 20 MEQ/1
40 TABLET, EXTENDED RELEASE ORAL
Status: DISCONTINUED | OUTPATIENT
Start: 2022-05-22 | End: 2022-05-25 | Stop reason: HOSPADM

## 2022-05-22 RX ORDER — LANOLIN ALCOHOL/MO/W.PET/CERES
800 CREAM (GRAM) TOPICAL
Status: DISCONTINUED | OUTPATIENT
Start: 2022-05-22 | End: 2022-05-25 | Stop reason: HOSPADM

## 2022-05-22 RX ORDER — POTASSIUM CHLORIDE 7.45 MG/ML
20 INJECTION INTRAVENOUS
Status: DISCONTINUED | OUTPATIENT
Start: 2022-05-22 | End: 2022-05-25 | Stop reason: HOSPADM

## 2022-05-22 RX ORDER — AMLODIPINE BESYLATE 5 MG/1
5 TABLET ORAL DAILY
Status: DISCONTINUED | OUTPATIENT
Start: 2022-05-22 | End: 2022-05-25 | Stop reason: HOSPADM

## 2022-05-22 RX ORDER — LOSARTAN POTASSIUM 50 MG/1
50 TABLET ORAL DAILY
Status: DISCONTINUED | OUTPATIENT
Start: 2022-05-22 | End: 2022-05-25 | Stop reason: HOSPADM

## 2022-05-22 RX ORDER — MAGNESIUM SULFATE HEPTAHYDRATE 40 MG/ML
4 INJECTION, SOLUTION INTRAVENOUS
Status: DISCONTINUED | OUTPATIENT
Start: 2022-05-22 | End: 2022-05-25 | Stop reason: HOSPADM

## 2022-05-22 RX ADMIN — ACETAMINOPHEN 650 MG: 325 TABLET, FILM COATED ORAL at 09:05

## 2022-05-22 RX ADMIN — GABAPENTIN 300 MG: 300 CAPSULE ORAL at 09:05

## 2022-05-22 RX ADMIN — INSULIN DETEMIR 15 UNITS: 100 INJECTION, SOLUTION SUBCUTANEOUS at 09:05

## 2022-05-22 RX ADMIN — PANTOPRAZOLE SODIUM 40 MG: 40 TABLET, DELAYED RELEASE ORAL at 05:05

## 2022-05-22 RX ADMIN — LACTOBACILLUS TAB 4 TABLET: TAB at 11:05

## 2022-05-22 RX ADMIN — ATORVASTATIN CALCIUM 40 MG: 40 TABLET, FILM COATED ORAL at 09:05

## 2022-05-22 RX ADMIN — HYDRALAZINE HYDROCHLORIDE 5 MG: 20 INJECTION INTRAMUSCULAR; INTRAVENOUS at 05:05

## 2022-05-22 RX ADMIN — PIPERACILLIN SODIUM,TAZOBACTAM SODIUM 3.38 G: 3; .375 INJECTION, POWDER, FOR SOLUTION INTRAVENOUS at 04:05

## 2022-05-22 RX ADMIN — POTASSIUM CHLORIDE 40 MEQ: 20 TABLET, EXTENDED RELEASE ORAL at 11:05

## 2022-05-22 RX ADMIN — GADOBUTROL 7 ML: 604.72 INJECTION INTRAVENOUS at 02:05

## 2022-05-22 RX ADMIN — PIPERACILLIN SODIUM,TAZOBACTAM SODIUM 3.38 G: 3; .375 INJECTION, POWDER, FOR SOLUTION INTRAVENOUS at 12:05

## 2022-05-22 RX ADMIN — LACTOBACILLUS TAB 4 TABLET: TAB at 09:05

## 2022-05-22 RX ADMIN — PIPERACILLIN SODIUM,TAZOBACTAM SODIUM 3.38 G: 3; .375 INJECTION, POWDER, FOR SOLUTION INTRAVENOUS at 09:05

## 2022-05-22 RX ADMIN — LOSARTAN POTASSIUM 50 MG: 50 TABLET, FILM COATED ORAL at 09:05

## 2022-05-22 RX ADMIN — LACTOBACILLUS TAB 4 TABLET: TAB at 04:05

## 2022-05-22 RX ADMIN — ENOXAPARIN SODIUM 40 MG: 40 INJECTION SUBCUTANEOUS at 04:05

## 2022-05-22 RX ADMIN — AMLODIPINE BESYLATE 5 MG: 5 TABLET ORAL at 11:05

## 2022-05-22 NOTE — PROGRESS NOTES
[facility]    Progress Note    Patient Name: John Martini  MRN: [id number]  Patient Class: OP- Observation   Admission Date: 5/20/2022  8:42 AM  Length of Stay: 0  Attending Physician: Joe Ibarra MD  Primary Care Provider: Eric Freeman MD  Face-to-Face encounter date: 05/22/2022  Code status:  Chief Complaint: Fatigue (Fatigue x 1 week with hematuria x 3 days ) and Hematuria        Subjective:    HPI: JoseA ngel Zavaleta MD  John Martini is a 72 y.o. White male   With PMH of gastric bypass,  DM2, HTN, HLD,  who presents with generalized weakness.     He was admitted to [place] for UTI from 5/13 to 5/15  He had generalized weakness before admission  He continues with physical deconditioning afterwards  He was discharged on augmentin  He now has diarrhea by report  However he hasn't had a BM in the ER yet today     He reports fever at home, Tmax 100.7  No fever in ER so far; no leukocytosis on CBC today  +nausea without vomiting  No dysuria (though he reported it to ER physician)  No abdominal pain  No diaphoresis  No SOB  No CP  Pt came to ER and got admitted    Interval History:   5/21:  Patient had another episode of diarrhea overnight , C diff negative occult positive.  H&H stable.  Abdominal CT to rule out intra-abdominal mass.  Patient has a history of bypass, would check B12, folate and iron levels. No concerns/issues overnight reported by the patient or the nursing staff.    5/22:  Diarrhea has resolved and H&H stable.  Iron levels low and was replaced IV Ferrlecit.  Abdominal CT was grossly unremarkable and kidney ultrasound showed complex cystic mass of left kidney contrast CT/MRI recommended.  Due to nationwide shortage of contrast, would order an MRI.  Blood pressure uncontrolled as patient's home medication of amlodipine and irbesartan was held, would resume Irbesartan  substituted for losartan and a home medication of amlodipine.  Patient  states that he would like to go to rehab, consult has been placed in for .    Review of Systems All other Review of Systems were found to be negative expect for that mentioned already in HPI.     Objective:     Vitals:    05/22/22 0438 05/22/22 0753 05/22/22 0822 05/22/22 1207   BP: (!) 159/78 (!) 160/85  (!) 141/91   Pulse: (!) 57 61 60 (!) 56   Resp: 18 18 18 18   Temp: 98.2 °F (36.8 °C) 100.1 °F (37.8 °C)  98 °F (36.7 °C)   TempSrc: Oral Oral  Oral   SpO2: (!) 93% 95% 96% (!) 94%   Weight:       Height:            Vitals reviewed.  Constitutional: No distress.   HENT: NC  Head: Atraumatic.   Cardiovascular: Normal rate, regular rhythm and normal heart sounds.   Pulmonary/Chest: Effort normal. No wheezes.   Abdominal: Soft. Bowel sounds are normal. No distension and no mass. No tenderness  Neurological: Alert.   Skin: Skin is warm and dry.   Psych: Appropriate mood and affect    Following labs were Reviewed   CBC:  Recent Labs   Lab 05/22/22  0912   WBC 7.64   HGB 12.5*   HCT 37.7*        CMP:  Recent Labs   Lab 05/22/22  0912   CALCIUM 8.0*   ALBUMIN 3.0*   PROT 6.4   *   K 3.0*   CO2 26   CL 95   BUN 8   CREATININE 0.9   ALKPHOS 60   ALT 19   AST 23   BILITOT 0.9       Micro Results  Microbiology Results (last 7 days)     Procedure Component Value Units Date/Time    Blood culture x two cultures. Draw prior to antibiotics. [[id number]] Collected: 05/20/22 1029    Order Status: Completed Specimen: Blood from Peripheral, Upper Arm, Left Updated: 05/22/22 1232     Blood Culture, Routine No Growth to date      No Growth to date      No Growth to date    Narrative:      Aerobic and anaerobic    Blood culture x two cultures. Draw prior to antibiotics. [[id number]] Collected: 05/20/22 1202    Order Status: Completed Specimen: Blood from Peripheral, Hand, Right Updated: 05/22/22 1232     Blood Culture, Routine No Growth to date      No Growth to date      No Growth to date    Narrative:       Aerobic and anaerobic    Stool culture [[id number]] Collected: 05/21/22 0754    Order Status: Completed Specimen: Stool Updated: 05/22/22 0943     Stool Culture Nothing significant to date    Clostridium difficile EIA [[id number]] Collected: 05/21/22 0754    Order Status: Completed Specimen: Stool Updated: 05/21/22 0929     C. diff Antigen Negative     C difficile Toxins A+B, EIA Negative     Comment: Testing not recommended for children <24 months old.              Radiology Reports  X-Ray Chest AP Portable  Result Date: 5/20/2022  IMPRESSION: No evidence of active cardiopulmonary disease. Electronically signed by:  Martinez Berger MD  5/20/2022 9:36 AM CDT Workstation: [id number]       Meds  Scheduled Meds:   amLODIPine  5 mg Oral Daily    atorvastatin  40 mg Oral Nightly    enoxaparin  40 mg Subcutaneous Daily    gabapentin  300 mg Oral BID    insulin detemir U-100  15 Units Subcutaneous Daily    Lactobacillus acidoph-L.bulgar  4 tablet Oral TID WM    losartan  50 mg Oral Daily    pantoprazole  40 mg Oral Before breakfast    piperacillin-tazobactam (ZOSYN) IVPB  3.375 g Intravenous Q8H     Continuous Infusions:    PRN Meds:.acetaminophen, albuterol-ipratropium, calcium chloride IVPB, calcium chloride IVPB, calcium chloride IVPB, dextrose 50 % in water (D50W), dextrose 50%, dextrose 50%, dextrose 50%, glucagon (human recombinant), glucose, glucose, hydrALAZINE, hydrALAZINE, HYDROcodone-acetaminophen, insulin aspart U-100, loperamide, magnesium oxide, magnesium sulfate IVPB, magnesium sulfate IVPB, magnesium sulfate IVPB, magnesium sulfate IVPB, melatonin, naloxone, ondansetron, polyethylene glycol, potassium chloride in water, potassium chloride in water, potassium chloride in water, potassium chloride in water, potassium chloride, potassium chloride, potassium chloride, potassium chloride, senna-docusate 8.6-50 mg, simethicone, sodium chloride 0.9%, sodium phosphate IVPB, sodium phosphate IVPB, sodium  phosphate IVPB, sodium phosphate IVPB, sodium phosphate IVPB.    Assessment & Plan:     Diarrhea  Resolved  C diff negative  Continue probiotics    Hypokalemia  Replace    Renal mass   MRI    HTN  Resume home medication of losartan and amlodipine    DM2  - basal insulin  - SSI     Physical Deconditioning  - PT/OT          Discharge Planning:   Is the patient medically ready for discharge?: no    Reason for patient still in hospital (select all that apply): Patient trending condition and Treatment    Above encounter included review of the medical records, interviewing and examining the patient face-to-face, discussion with family and other health care providers, ordering and interpreting lab/test results and formulating a plan of care.     Medical Decision Making:      [_] Low Complexity  [_] Moderate Complexity  [x] High Complexity      Joe Ibarra MD  Department of Hospital Medicine   [facility]

## 2022-05-22 NOTE — CARE UPDATE
05/22/22 0822   Patient Assessment/Suction   Level of Consciousness (AVPU) alert   Respiratory Effort Normal;Unlabored   Expansion/Accessory Muscles/Retractions no use of accessory muscles;no retractions;expansion symmetric   All Lung Fields Breath Sounds clear   Rhythm/Pattern, Respiratory unlabored;pattern regular;depth regular;chest wiggle adequate;tachypneic   Cough Frequency no cough   PRE-TX-O2   O2 Device (Oxygen Therapy) room air   SpO2 96 %   Pulse Oximetry Type Intermittent   $ Pulse Oximetry - Multiple Charge Pulse Oximetry - Multiple   Pulse 60   Resp 18   Aerosol Therapy   $ Aerosol Therapy Charges PRN treatment not required   Education   $ Education Bronchodilator;15 min   Respiratory Evaluation   $ Care Plan Tech Time 15 min

## 2022-05-22 NOTE — CARE UPDATE
05/21/22 1952   Patient Assessment/Suction   Level of Consciousness (AVPU) alert   Respiratory Effort Normal;Unlabored   Expansion/Accessory Muscles/Retractions no use of accessory muscles   All Lung Fields Breath Sounds Anterior:;diminished;clear   Rhythm/Pattern, Respiratory no shortness of breath reported;depth regular;pattern regular;unlabored   Cough Frequency no cough   PRE-TX-O2   O2 Device (Oxygen Therapy) room air   SpO2 97 %   Pulse Oximetry Type Intermittent   $ Pulse Oximetry - Multiple Charge Pulse Oximetry - Multiple   Positioning   Body Position position changed independently   Head of Bed (HOB) Positioning HOB at 30-45 degrees   Positioning/Transfer Devices pillows;in use   Aerosol Therapy   $ Aerosol Therapy Charges PRN treatment not required   Education   $ Education Bronchodilator;15 min   Respiratory Evaluation   $ Care Plan Tech Time 15 min   $ Eval/Re-eval Charges Re-evaluation

## 2022-05-22 NOTE — PT/OT/SLP PROGRESS
"Physical Therapy Treatment    Patient Name:  John Martini   MRN:  [id number]    Recommendations:     Discharge Recommendations:  rehabilitation facility, home health PT (pending pt progress)   Discharge Equipment Recommendations:  (TBD)   Barriers to discharge: None    Assessment:     John Martini is a 72 y.o. male admitted with a medical diagnosis of Hypokalemia.  He presents with the following impairments/functional limitations:  weakness, impaired endurance, impaired self care skills, impaired functional mobilty, gait instability, impaired balance, pain.    Pt found supine, required encouragement for supine exercises as his back was hurting too much for OOB activity. Pt performed all exercises with cues and instruction for proper technique. Pt left in supine with all needs in reach and nurse present.    Rehab Prognosis: Good; patient would benefit from acute skilled PT services to address these deficits and reach maximum level of function.    Recent Surgery: * No surgery found *      Plan:     During this hospitalization, patient to be seen daily to address the identified rehab impairments via gait training, therapeutic activities, therapeutic exercises and progress toward the following goals:    · Plan of Care Expires:  06/24/22    Subjective     Chief Complaint: back pain  Patient/Family Comments/goals: to feel better  Pain/Comfort:  · Pain Rating 1:  ("bad")  · Location - Orientation 1: lower  · Location 1: back  · Pain Addressed 1: Reposition, Distraction      Objective:     Communicated with nurse prior to session.  Patient found supine with telemetry, bed alarm upon PT entry to room.     General Precautions: Standard, fall   Orthopedic Precautions:N/A   Braces: N/A  Respiratory Status: Room air     Functional Mobility:  · Bed Mobility:     · Scooting: supervision  · Supine to Sit: contact guard assistance  · Sit to Supine: contact guard assistance  · Balance: SBA in seated to change his " gown    Therapeutic Activities and Exercises:   Education, poc, dc planning, fall prevention.  Pt performed LE therapeutic exercise to B LEs 2x10 each: SAQ, glut sets, ankle pumps, hip abduction/adduction.    Patient left supine with all lines intact, call button in reach, bed alarm on and nurse present.    GOALS:   Multidisciplinary Problems     Physical Therapy Goals        Problem: Physical Therapy    Goal Priority Disciplines Outcome Goal Variances Interventions   Physical Therapy Goal     PT, PT/OT Ongoing, Progressing     Description: Goals to be met by: 6/3/22    Patient will increase functional independence with mobility by performin. Supine to sit with [place]  2. Sit to supine with [place]  3. Sit to stand transfer with Supervision  4. Bed to chair transfer with Stand-by Assistance using Rolling Walker  5. Gait  x 150 feet with Stand-by Assistance using Rolling Walker.                      Time Tracking:     PT Received On: 22  PT Start Time: 917     PT Stop Time: 929  PT Total Time (min): 12 min     Billable Minutes: Therapeutic Exercise 12    Treatment Type: Treatment  PT/PTA: PT     PTA Visit Number: 0     2022

## 2022-05-22 NOTE — PLAN OF CARE
Problem: Adult Inpatient Plan of Care  Goal: Plan of Care Review  Outcome: Ongoing, Progressing  Goal: Patient-Specific Goal (Individualized)  Outcome: Ongoing, Progressing  Goal: Absence of Hospital-Acquired Illness or Injury  Outcome: Ongoing, Progressing  Goal: Optimal Comfort and Wellbeing  Outcome: Ongoing, Progressing  Goal: Readiness for Transition of Care  Outcome: Ongoing, Progressing     Problem: Diabetes Comorbidity  Goal: Blood Glucose Level Within Targeted Range  Outcome: Ongoing, Progressing     Problem: Infection  Goal: Absence of Infection Signs and Symptoms  Outcome: Ongoing, Progressing     Problem: Skin Injury Risk Increased  Goal: Skin Health and Integrity  Outcome: Ongoing, Progressing     Problem: Impaired Wound Healing  Goal: Optimal Wound Healing  Outcome: Ongoing, Progressing

## 2022-05-22 NOTE — PROGRESS NOTES
MRI of Abd was done w/wo contrast. Patient had a spot found on left kidney on us. Patient came in with fatigue and hematuria. Patient received 7.0ml of gadavist in r-arm iv.

## 2022-05-23 LAB
ALBUMIN SERPL BCP-MCNC: 3.1 G/DL (ref 3.5–5.2)
ALP SERPL-CCNC: 57 U/L (ref 55–135)
ALT SERPL W/O P-5'-P-CCNC: 20 U/L (ref 10–44)
ANION GAP SERPL CALC-SCNC: 11 MMOL/L (ref 8–16)
AST SERPL-CCNC: 21 U/L (ref 10–40)
BASOPHILS # BLD AUTO: 0.09 K/UL (ref 0–0.2)
BASOPHILS NFR BLD: 1.2 % (ref 0–1.9)
BILIRUB SERPL-MCNC: 0.8 MG/DL (ref 0.1–1)
BUN SERPL-MCNC: 7 MG/DL (ref 8–23)
CALCIUM SERPL-MCNC: 8.2 MG/DL (ref 8.7–10.5)
CHLORIDE SERPL-SCNC: 99 MMOL/L (ref 95–110)
CO2 SERPL-SCNC: 24 MMOL/L (ref 23–29)
CREAT SERPL-MCNC: 0.8 MG/DL (ref 0.5–1.4)
DIFFERENTIAL METHOD: ABNORMAL
EOSINOPHIL # BLD AUTO: 0.2 K/UL (ref 0–0.5)
EOSINOPHIL NFR BLD: 2.5 % (ref 0–8)
ERYTHROCYTE [DISTWIDTH] IN BLOOD BY AUTOMATED COUNT: 14.7 % (ref 11.5–14.5)
EST. GFR  (AFRICAN AMERICAN): >60 ML/MIN/1.73 M^2
EST. GFR  (NON AFRICAN AMERICAN): >60 ML/MIN/1.73 M^2
GLUCOSE SERPL-MCNC: 111 MG/DL (ref 70–110)
GLUCOSE SERPL-MCNC: 120 MG/DL (ref 70–110)
GLUCOSE SERPL-MCNC: 152 MG/DL (ref 70–110)
GLUCOSE SERPL-MCNC: 164 MG/DL (ref 70–110)
GLUCOSE SERPL-MCNC: 207 MG/DL (ref 70–110)
HCT VFR BLD AUTO: 36.9 % (ref 40–54)
HGB BLD-MCNC: 12.2 G/DL (ref 14–18)
IMM GRANULOCYTES # BLD AUTO: 0.1 K/UL (ref 0–0.04)
IMM GRANULOCYTES NFR BLD AUTO: 1.3 % (ref 0–0.5)
LYMPHOCYTES # BLD AUTO: 2.1 K/UL (ref 1–4.8)
LYMPHOCYTES NFR BLD: 27.7 % (ref 18–48)
MAGNESIUM SERPL-MCNC: 1.9 MG/DL (ref 1.6–2.6)
MCH RBC QN AUTO: 26.6 PG (ref 27–31)
MCHC RBC AUTO-ENTMCNC: 33.1 G/DL (ref 32–36)
MCV RBC AUTO: 80 FL (ref 82–98)
MONOCYTES # BLD AUTO: 0.7 K/UL (ref 0.3–1)
MONOCYTES NFR BLD: 9.3 % (ref 4–15)
NEUTROPHILS # BLD AUTO: 4.4 K/UL (ref 1.8–7.7)
NEUTROPHILS NFR BLD: 58 % (ref 38–73)
NRBC BLD-RTO: 0 /100 WBC
PLATELET # BLD AUTO: 323 K/UL (ref 150–450)
PMV BLD AUTO: 10.8 FL (ref 9.2–12.9)
POTASSIUM SERPL-SCNC: 3.1 MMOL/L (ref 3.5–5.1)
POTASSIUM SERPL-SCNC: 3.3 MMOL/L (ref 3.5–5.1)
PROT SERPL-MCNC: 6.2 G/DL (ref 6–8.4)
RBC # BLD AUTO: 4.59 M/UL (ref 4.6–6.2)
SODIUM SERPL-SCNC: 134 MMOL/L (ref 136–145)
STOOL CULTURE: NORMAL
STOOL CULTURE: NORMAL
WBC # BLD AUTO: 7.51 K/UL (ref 3.9–12.7)

## 2022-05-23 PROCEDURE — 99900035 HC TECH TIME PER 15 MIN (STAT)

## 2022-05-23 PROCEDURE — 97535 SELF CARE MNGMENT TRAINING: CPT

## 2022-05-23 PROCEDURE — 85025 COMPLETE CBC W/AUTO DIFF WBC: CPT | Performed by: FAMILY MEDICINE

## 2022-05-23 PROCEDURE — 63600175 PHARM REV CODE 636 W HCPCS: Performed by: STUDENT IN AN ORGANIZED HEALTH CARE EDUCATION/TRAINING PROGRAM

## 2022-05-23 PROCEDURE — 96366 THER/PROPH/DIAG IV INF ADDON: CPT

## 2022-05-23 PROCEDURE — 36415 COLL VENOUS BLD VENIPUNCTURE: CPT | Performed by: STUDENT IN AN ORGANIZED HEALTH CARE EDUCATION/TRAINING PROGRAM

## 2022-05-23 PROCEDURE — 80053 COMPREHEN METABOLIC PANEL: CPT | Performed by: FAMILY MEDICINE

## 2022-05-23 PROCEDURE — 99900031 HC PATIENT EDUCATION (STAT)

## 2022-05-23 PROCEDURE — 84132 ASSAY OF SERUM POTASSIUM: CPT | Mod: 91 | Performed by: STUDENT IN AN ORGANIZED HEALTH CARE EDUCATION/TRAINING PROGRAM

## 2022-05-23 PROCEDURE — C9399 UNCLASSIFIED DRUGS OR BIOLOG: HCPCS | Performed by: FAMILY MEDICINE

## 2022-05-23 PROCEDURE — 94799 UNLISTED PULMONARY SVC/PX: CPT

## 2022-05-23 PROCEDURE — 96372 THER/PROPH/DIAG INJ SC/IM: CPT | Mod: 59 | Performed by: FAMILY MEDICINE

## 2022-05-23 PROCEDURE — 25000003 PHARM REV CODE 250: Performed by: STUDENT IN AN ORGANIZED HEALTH CARE EDUCATION/TRAINING PROGRAM

## 2022-05-23 PROCEDURE — 83735 ASSAY OF MAGNESIUM: CPT | Performed by: FAMILY MEDICINE

## 2022-05-23 PROCEDURE — 63600175 PHARM REV CODE 636 W HCPCS: Performed by: FAMILY MEDICINE

## 2022-05-23 PROCEDURE — 25000003 PHARM REV CODE 250: Performed by: FAMILY MEDICINE

## 2022-05-23 PROCEDURE — 94761 N-INVAS EAR/PLS OXIMETRY MLT: CPT

## 2022-05-23 PROCEDURE — G0378 HOSPITAL OBSERVATION PER HR: HCPCS

## 2022-05-23 PROCEDURE — 36415 COLL VENOUS BLD VENIPUNCTURE: CPT | Performed by: FAMILY MEDICINE

## 2022-05-23 PROCEDURE — 97116 GAIT TRAINING THERAPY: CPT

## 2022-05-23 RX ADMIN — PIPERACILLIN SODIUM,TAZOBACTAM SODIUM 3.38 G: 3; .375 INJECTION, POWDER, FOR SOLUTION INTRAVENOUS at 04:05

## 2022-05-23 RX ADMIN — AMLODIPINE BESYLATE 5 MG: 5 TABLET ORAL at 09:05

## 2022-05-23 RX ADMIN — ACETAMINOPHEN 650 MG: 325 TABLET, FILM COATED ORAL at 02:05

## 2022-05-23 RX ADMIN — PANTOPRAZOLE SODIUM 40 MG: 40 TABLET, DELAYED RELEASE ORAL at 06:05

## 2022-05-23 RX ADMIN — LACTOBACILLUS TAB 4 TABLET: TAB at 04:05

## 2022-05-23 RX ADMIN — LACTOBACILLUS TAB 4 TABLET: TAB at 09:05

## 2022-05-23 RX ADMIN — GABAPENTIN 300 MG: 300 CAPSULE ORAL at 08:05

## 2022-05-23 RX ADMIN — ATORVASTATIN CALCIUM 40 MG: 40 TABLET, FILM COATED ORAL at 08:05

## 2022-05-23 RX ADMIN — INSULIN DETEMIR 15 UNITS: 100 INJECTION, SOLUTION SUBCUTANEOUS at 09:05

## 2022-05-23 RX ADMIN — POTASSIUM CHLORIDE 40 MEQ: 20 TABLET, EXTENDED RELEASE ORAL at 01:05

## 2022-05-23 RX ADMIN — POTASSIUM CHLORIDE 40 MEQ: 20 TABLET, EXTENDED RELEASE ORAL at 09:05

## 2022-05-23 RX ADMIN — PIPERACILLIN SODIUM,TAZOBACTAM SODIUM 3.38 G: 3; .375 INJECTION, POWDER, FOR SOLUTION INTRAVENOUS at 01:05

## 2022-05-23 RX ADMIN — LOSARTAN POTASSIUM 50 MG: 50 TABLET, FILM COATED ORAL at 09:05

## 2022-05-23 RX ADMIN — LACTOBACILLUS TAB 4 TABLET: TAB at 11:05

## 2022-05-23 RX ADMIN — ENOXAPARIN SODIUM 40 MG: 40 INJECTION SUBCUTANEOUS at 04:05

## 2022-05-23 RX ADMIN — PIPERACILLIN SODIUM,TAZOBACTAM SODIUM 3.38 G: 3; .375 INJECTION, POWDER, FOR SOLUTION INTRAVENOUS at 09:05

## 2022-05-23 RX ADMIN — GABAPENTIN 300 MG: 300 CAPSULE ORAL at 09:05

## 2022-05-23 RX ADMIN — ACETAMINOPHEN 650 MG: 325 TABLET, FILM COATED ORAL at 08:05

## 2022-05-23 NOTE — PLAN OF CARE
Rehab consult sent via [facility] to [facility] [place], [facility] and [facility].    Patient choice form signed and scanned into media.       05/23/22 0205   Post-Acute Status   Post-Acute Authorization Placement   Post-Acute Placement Status Referrals Sent   Patient choice form signed by patient/caregiver List with quality metrics by geographic area provided

## 2022-05-23 NOTE — PT/OT/SLP PROGRESS
Physical Therapy Treatment    Patient Name:  John Martini   MRN:  [id number]    Recommendations:     Discharge Recommendations:  rehabilitation facility, home health PT   Discharge Equipment Recommendations: other (see comments) (TBD)   Barriers to discharge: increase assist with mobility    Assessment:     John Martini is a 72 y.o. male admitted with a medical diagnosis of Hypokalemia.  He presents with the following impairments/functional limitations:  weakness, impaired endurance, impaired functional mobilty, impaired self care skills, gait instability, impaired balance, decreased lower extremity function, decreased safety awareness, pain, impaired cardiopulmonary response to activity.    Wife present throughout visit. Reports significant improvement in pain and agreeable to ambulation this date. Pt ambulated 90 ft with RW and min A on the straight away but required mod A upon exiting the room due to loss of balance. Pt presents with decrease stride length and foot clearance throughout ambulation.      Rehab Prognosis: Fair; patient would benefit from acute skilled PT services to address these deficits and reach maximum level of function.    Recent Surgery: * No surgery found *      Plan:     During this hospitalization, patient to be seen daily to address the identified rehab impairments via gait training, therapeutic activities, therapeutic exercises, neuromuscular re-education and progress toward the following goals:    · Plan of Care Expires:  06/23/22    Subjective     Chief Complaint: none, reports feeling better  Patient/Family Comments/goals: get better  Pain/Comfort:  · Pain Rating 1: 0/10      Objective:     Communicated with RN prior to session.  Patient found HOB elevated with telemetry, peripheral IV upon PT entry to room.     General Precautions: Standard, fall   Orthopedic Precautions:N/A   Braces: N/A  Respiratory Status: Room air     Functional Mobility:  · Bed Mobility:     · Supine  to Sit: supervision  · Transfers:     · Sit to Stand:  supervision with rolling walker  · Gait: 90 ft with RW and min A on the straight away and mod A upon exiting the room      AM-PAC 6 CLICK MOBILITY          Therapeutic Activities and Exercises:   Pt educated on POC, discharge recommendation, importance of time OOB, RW management, pacing/energy conservation, optimal gait pattern, need for assist with mobility, use of call bell to seek assistance as needed and fall prevention      Patient left up in chair with all lines intact, call button in reach, chair alarm on and wife and extender present..    GOALS:   Multidisciplinary Problems     Physical Therapy Goals        Problem: Physical Therapy    Goal Priority Disciplines Outcome Goal Variances Interventions   Physical Therapy Goal     PT, PT/OT Ongoing, Progressing     Description: Goals to be met by: 6/3/22    Patient will increase functional independence with mobility by performin. Supine to sit with [place]  2. Sit to supine with [place]  3. Sit to stand transfer with Supervision  4. Bed to chair transfer with Stand-by Assistance using Rolling Walker  5. Gait  x 150 feet with Stand-by Assistance using Rolling Walker.                      Time Tracking:     PT Received On: 22  PT Start Time: 1110     PT Stop Time: 1121  PT Total Time (min): 11 min     Billable Minutes: Gait Training 11    Treatment Type: Evaluation  PT/PTA: PT     PTA Visit Number: 0     2022

## 2022-05-23 NOTE — PLAN OF CARE
05/23/22 4054   Respiratory Evaluation   $ Care Plan Tech Time   (UNABLE TO ASSESS PT DUE TO EMERGENT SITUATION)

## 2022-05-23 NOTE — PT/OT/SLP PROGRESS
Occupational Therapy   Treatment    Name: John Martini  MRN: [id number]  Admitting Diagnosis:  Hypokalemia       Recommendations:     Discharge Recommendations: rehabilitation facility  Discharge Equipment Recommendations:   (TBD)  Barriers to discharge:  Decreased caregiver support    Assessment:     John Martini is a 72 y.o. male with a medical diagnosis of Hypokalemia.  He presents with improving medical acuity and functional mobility. Patient participated in LB dressing sitting in chair, functional transfer, grooming standing at sink and ambulation in the hospital room using a rolling walker. Performance deficits affecting function are weakness, impaired endurance, impaired self care skills, impaired functional mobilty, gait instability, impaired balance, decreased safety awareness.     Rehab Prognosis:  Fair; patient would benefit from acute skilled OT services to address these deficits and reach maximum level of function.       Plan:     Patient to be seen 6 x/week to address the above listed problems via self-care/home management, therapeutic activities, therapeutic exercises  · Plan of Care Expires: 06/20/22  · Plan of Care Reviewed with: patient    Subjective     Pain/Comfort:  · Pain Rating 1: 0/10  · Pain Rating Post-Intervention 1: 0/10    Objective:     Communicated with: nurse prior to session.  Patient found up in chair with telemetry, peripheral IV upon OT entry to room.    General Precautions: Standard, fall   Orthopedic Precautions:N/A   Braces: N/A  Respiratory Status: Room air     Occupational Performance:     Bed Mobility:    · Patient completed Sit to Supine with contact guard assistance     Functional Mobility/Transfers:  · Patient completed Sit <> Stand Transfer with minimum assistance  with  rolling walker   · Functional Mobility: ambulated 15 feet using rolling walker with minimal assistance.    Activities of Daily Living:  · Grooming: minimum assistance to wash hands standing  at sink.  · Lower Body Dressing: contact guard assistance to don/doff socks sitting in chair.      [facility] 6 Click ADL:      Treatment & Education:  Patient had 2 losses of balance during session; once while ambulating to sink and then while standing at sink.    Patient left HOB elevated with all lines intact, call button in reach and bed alarm onEducation:      GOALS:   Multidisciplinary Problems     Occupational Therapy Goals        Problem: Occupational Therapy    Goal Priority Disciplines Outcome Interventions   Occupational Therapy Goal     OT, PT/OT     Description: Goals to be met by: d/c     Patient will increase functional independence with ADLs by performing:    UE Dressing with Supervision.  LE Dressing with Supervision and Assistive Devices as needed.  Grooming while standing at sink with Supervision.  Toileting from toilet with Supervision for hygiene and clothing management.   Toilet transfer to toilet with Supervision.                     Time Tracking:     OT Date of Treatment: 05/23/22  OT Start Time: 1320  OT Stop Time: 1336  OT Total Time (min): 16 min    Billable Minutes:Self Care/Home Management 16    OT/SIN: OT          5/23/2022

## 2022-05-23 NOTE — PROGRESS NOTES
[facility]    Progress Note    Patient Name: John Martini  MRN: [id number]  Patient Class: OP- Observation   Admission Date: 5/20/2022  8:42 AM  Length of Stay: 0  Attending Physician: Joe Ibarra MD  Primary Care Provider: Eric Freeman MD  Face-to-Face encounter date: 05/23/2022  Code status:  Chief Complaint: Fatigue (Fatigue x 1 week with hematuria x 3 days ) and Hematuria        Subjective:    HPI: Jose Angel Zavaleta MD  John Martini is a 72 y.o. White male   With PMH of gastric bypass,  DM2, HTN, HLD,  who presents with generalized weakness.     He was admitted to [place] for UTI from 5/13 to 5/15  He had generalized weakness before admission  He continues with physical deconditioning afterwards  He was discharged on augmentin  He now has diarrhea by report  However he hasn't had a BM in the ER yet today     He reports fever at home, Tmax 100.7  No fever in ER so far; no leukocytosis on CBC today  +nausea without vomiting  No dysuria (though he reported it to ER physician)  No abdominal pain  No diaphoresis  No SOB  No CP  Pt came to ER and got admitted    Interval History:   5/21:  Patient had another episode of diarrhea overnight , C diff negative occult positive.  H&H stable.  Abdominal CT to rule out intra-abdominal mass.  Patient has a history of bypass, would check B12, folate and iron levels. No concerns/issues overnight reported by the patient or the nursing staff.    5/22:  Diarrhea has resolved and H&H stable.  Iron levels low and was replaced IV Ferrlecit.  Abdominal CT was grossly unremarkable and kidney ultrasound showed complex cystic mass of left kidney contrast CT/MRI recommended.  Due to nationwide shortage of contrast, would order an MRI.  Blood pressure uncontrolled as patient's home medication of amlodipine and irbesartan was held, would resume Irbesartan  substituted for losartan and a home medication of amlodipine.  Patient  states that he would like to go to rehab, consult has been placed in for .    5/23:  Patient doing well, blood pressure is now better controlled since home medications have been resumed.  Awaiting rehab placement.  MRI is negative for renal ca.    Review of Systems All other Review of Systems were found to be negative expect for that mentioned already in HPI.     Objective:     Vitals:    05/22/22 2351 05/23/22 0435 05/23/22 0722 05/23/22 1202   BP: (!) 152/83 (!) 154/90 123/76 125/88   BP Location:       Patient Position:       Pulse: 61 (!) 54 (!) 56 (!) 55   Resp: 18 18 18 18   Temp: 98.8 °F (37.1 °C) 97.7 °F (36.5 °C) 97.5 °F (36.4 °C) 98.1 °F (36.7 °C)   TempSrc: Oral Oral Oral Oral   SpO2: (!) 94% 96% 98% 96%   Weight:       Height:            Vitals reviewed.  Constitutional: No distress.   HENT: NC  Head: Atraumatic.   Cardiovascular: Normal rate, regular rhythm and normal heart sounds.   Pulmonary/Chest: Effort normal. No wheezes.   Abdominal: Soft. Bowel sounds are normal. No distension and no mass. No tenderness  Neurological: Alert.   Skin: Skin is warm and dry.   Psych: Appropriate mood and affect    Following labs were Reviewed   CBC:  Recent Labs   Lab 05/23/22  0512   WBC 7.51   HGB 12.2*   HCT 36.9*        CMP:  Recent Labs   Lab 05/23/22  0512 05/23/22  1234   CALCIUM 8.2*  --    ALBUMIN 3.1*  --    PROT 6.2  --    *  --    K 3.1* 3.3*   CO2 24  --    CL 99  --    BUN 7*  --    CREATININE 0.8  --    ALKPHOS 57  --    ALT 20  --    AST 21  --    BILITOT 0.8  --        Micro Results  Microbiology Results (last 7 days)     Procedure Component Value Units Date/Time    Blood culture x two cultures. Draw prior to antibiotics. [[id number]] Collected: 05/20/22 1029    Order Status: Completed Specimen: Blood from Peripheral, Upper Arm, Left Updated: 05/23/22 1232     Blood Culture, Routine No Growth to date      No Growth to date      No Growth to date      No Growth to date     Narrative:      Aerobic and anaerobic    Blood culture x two cultures. Draw prior to antibiotics. [[id number]] Collected: 05/20/22 1202    Order Status: Completed Specimen: Blood from Peripheral, Hand, Right Updated: 05/23/22 1232     Blood Culture, Routine No Growth to date      No Growth to date      No Growth to date      No Growth to date    Narrative:      Aerobic and anaerobic    Stool culture [[id number]] Collected: 05/21/22 0754    Order Status: Completed Specimen: Stool Updated: 05/23/22 0722     Stool Culture No Salmonella,Shigella,Vibrio,Campylobacter.      No E coli 0157:H7 isolated.    Clostridium difficile EIA [[id number]] Collected: 05/21/22 0754    Order Status: Completed Specimen: Stool Updated: 05/21/22 0929     C. diff Antigen Negative     C difficile Toxins A+B, EIA Negative     Comment: Testing not recommended for children <24 months old.              Radiology Reports  X-Ray Chest AP Portable  Result Date: 5/20/2022  IMPRESSION: No evidence of active cardiopulmonary disease. Electronically signed by:  Martinez Berger MD  5/20/2022 9:36 AM CDT Workstation: [id number]       Meds  Scheduled Meds:   amLODIPine  5 mg Oral Daily    atorvastatin  40 mg Oral Nightly    enoxaparin  40 mg Subcutaneous Daily    gabapentin  300 mg Oral BID    insulin detemir U-100  15 Units Subcutaneous Daily    Lactobacillus acidoph-L.bulgar  4 tablet Oral TID WM    losartan  50 mg Oral Daily    pantoprazole  40 mg Oral Before breakfast    piperacillin-tazobactam (ZOSYN) IVPB  3.375 g Intravenous Q8H     Continuous Infusions:    PRN Meds:.acetaminophen, albuterol-ipratropium, calcium chloride IVPB, calcium chloride IVPB, calcium chloride IVPB, dextrose 50 % in water (D50W), dextrose 50%, dextrose 50%, dextrose 50%, glucagon (human recombinant), glucose, glucose, hydrALAZINE, hydrALAZINE, HYDROcodone-acetaminophen, insulin aspart U-100, loperamide, magnesium oxide, magnesium sulfate IVPB, magnesium sulfate IVPB,  magnesium sulfate IVPB, magnesium sulfate IVPB, melatonin, naloxone, ondansetron, polyethylene glycol, potassium chloride in water, potassium chloride in water, potassium chloride in water, potassium chloride in water, potassium chloride, potassium chloride, potassium chloride, potassium chloride, senna-docusate 8.6-50 mg, simethicone, sodium chloride 0.9%, sodium phosphate IVPB, sodium phosphate IVPB, sodium phosphate IVPB, sodium phosphate IVPB, sodium phosphate IVPB.    Assessment & Plan:     Diarrhea  Resolved  C diff negative  Continue probiotics    Hypokalemia  Replace    Renal mass   Cystic mass, MRI ruled out Ca    HTN  Resume home medication of losartan and amlodipine    DM2  - basal insulin  - SSI     Physical Deconditioning  - PT/OT          Discharge Planning:   Is the patient medically ready for discharge?: yes    Reason for patient still in hospital (select all that apply): Patient trending condition and Treatment    Above encounter included review of the medical records, interviewing and examining the patient face-to-face, discussion with family and other health care providers, ordering and interpreting lab/test results and formulating a plan of care.     Medical Decision Making:      [_] Low Complexity  [_] Moderate Complexity  [x] High Complexity      Joe Ibarra MD  Department of Hospital Medicine   [facility]

## 2022-05-23 NOTE — PLAN OF CARE
05/22/22 2049   Patient Assessment/Suction   Level of Consciousness (AVPU) alert   Respiratory Effort Normal;Unlabored   Expansion/Accessory Muscles/Retractions no use of accessory muscles   All Lung Fields Breath Sounds Anterior:;clear   Rhythm/Pattern, Respiratory unlabored   Cough Frequency infrequent   PRE-TX-O2   O2 Device (Oxygen Therapy) room air   SpO2 (!) 93 %   Pulse Oximetry Type Intermittent   $ Pulse Oximetry - Multiple Charge Pulse Oximetry - Multiple   Pulse 68   Resp 18   Aerosol Therapy   $ Aerosol Therapy Charges PRN treatment not required   Education   $ Education DME Oxygen;15 min   Respiratory Evaluation   $ Care Plan Tech Time 15 min

## 2022-05-24 LAB
ANION GAP SERPL CALC-SCNC: 11 MMOL/L (ref 8–16)
BUN SERPL-MCNC: 9 MG/DL (ref 8–23)
CALCIUM SERPL-MCNC: 8.5 MG/DL (ref 8.7–10.5)
CHLORIDE SERPL-SCNC: 97 MMOL/L (ref 95–110)
CO2 SERPL-SCNC: 26 MMOL/L (ref 23–29)
CREAT SERPL-MCNC: 0.9 MG/DL (ref 0.5–1.4)
EST. GFR  (AFRICAN AMERICAN): >60 ML/MIN/1.73 M^2
EST. GFR  (NON AFRICAN AMERICAN): >60 ML/MIN/1.73 M^2
GLUCOSE SERPL-MCNC: 137 MG/DL (ref 70–110)
GLUCOSE SERPL-MCNC: 142 MG/DL (ref 70–110)
GLUCOSE SERPL-MCNC: 160 MG/DL (ref 70–110)
GLUCOSE SERPL-MCNC: 179 MG/DL (ref 70–110)
GLUCOSE SERPL-MCNC: 197 MG/DL (ref 70–110)
MAGNESIUM SERPL-MCNC: 1.9 MG/DL (ref 1.6–2.6)
POTASSIUM SERPL-SCNC: 3.5 MMOL/L (ref 3.5–5.1)
SODIUM SERPL-SCNC: 134 MMOL/L (ref 136–145)

## 2022-05-24 PROCEDURE — 96372 THER/PROPH/DIAG INJ SC/IM: CPT | Mod: 59 | Performed by: FAMILY MEDICINE

## 2022-05-24 PROCEDURE — 63600175 PHARM REV CODE 636 W HCPCS: Performed by: FAMILY MEDICINE

## 2022-05-24 PROCEDURE — 25000003 PHARM REV CODE 250: Performed by: STUDENT IN AN ORGANIZED HEALTH CARE EDUCATION/TRAINING PROGRAM

## 2022-05-24 PROCEDURE — 83735 ASSAY OF MAGNESIUM: CPT | Performed by: STUDENT IN AN ORGANIZED HEALTH CARE EDUCATION/TRAINING PROGRAM

## 2022-05-24 PROCEDURE — 97116 GAIT TRAINING THERAPY: CPT

## 2022-05-24 PROCEDURE — 36415 COLL VENOUS BLD VENIPUNCTURE: CPT | Performed by: STUDENT IN AN ORGANIZED HEALTH CARE EDUCATION/TRAINING PROGRAM

## 2022-05-24 PROCEDURE — 80048 BASIC METABOLIC PNL TOTAL CA: CPT | Mod: XB | Performed by: STUDENT IN AN ORGANIZED HEALTH CARE EDUCATION/TRAINING PROGRAM

## 2022-05-24 PROCEDURE — G0378 HOSPITAL OBSERVATION PER HR: HCPCS

## 2022-05-24 PROCEDURE — 97535 SELF CARE MNGMENT TRAINING: CPT

## 2022-05-24 PROCEDURE — 25000003 PHARM REV CODE 250: Performed by: FAMILY MEDICINE

## 2022-05-24 RX ADMIN — ENOXAPARIN SODIUM 40 MG: 40 INJECTION SUBCUTANEOUS at 04:05

## 2022-05-24 RX ADMIN — LACTOBACILLUS TAB 4 TABLET: TAB at 01:05

## 2022-05-24 RX ADMIN — LOSARTAN POTASSIUM 50 MG: 50 TABLET, FILM COATED ORAL at 10:05

## 2022-05-24 RX ADMIN — PANTOPRAZOLE SODIUM 40 MG: 40 TABLET, DELAYED RELEASE ORAL at 05:05

## 2022-05-24 RX ADMIN — AMLODIPINE BESYLATE 5 MG: 5 TABLET ORAL at 10:05

## 2022-05-24 RX ADMIN — HYDROCODONE BITARTRATE AND ACETAMINOPHEN 1 TABLET: 5; 325 TABLET ORAL at 08:05

## 2022-05-24 RX ADMIN — GABAPENTIN 300 MG: 300 CAPSULE ORAL at 10:05

## 2022-05-24 RX ADMIN — GABAPENTIN 300 MG: 300 CAPSULE ORAL at 08:05

## 2022-05-24 RX ADMIN — ATORVASTATIN CALCIUM 40 MG: 40 TABLET, FILM COATED ORAL at 08:05

## 2022-05-24 RX ADMIN — LACTOBACILLUS TAB 4 TABLET: TAB at 04:05

## 2022-05-24 RX ADMIN — INSULIN DETEMIR 15 UNITS: 100 INJECTION, SOLUTION SUBCUTANEOUS at 10:05

## 2022-05-24 RX ADMIN — ACETAMINOPHEN 650 MG: 325 TABLET, FILM COATED ORAL at 04:05

## 2022-05-24 RX ADMIN — LACTOBACILLUS TAB 4 TABLET: TAB at 10:05

## 2022-05-24 NOTE — PLAN OF CARE
Problem: Occupational Therapy  Goal: Occupational Therapy Goal  Description: Goals to be met by: d/c     Patient will increase functional independence with ADLs by performing:    UE Dressing with Supervision.  LE Dressing with Supervision and Assistive Devices as needed.  Grooming while standing at sink with Supervision.  Toileting from toilet with Supervision for hygiene and clothing management.   Toilet transfer to toilet with Supervision.    Outcome: Ongoing, Progressing

## 2022-05-24 NOTE — CARE UPDATE
05/23/22 4525   Patient Assessment/Suction   Level of Consciousness (AVPU) alert   Respiratory Effort Normal;Unlabored   Expansion/Accessory Muscles/Retractions no use of accessory muscles   All Lung Fields Breath Sounds clear   Rhythm/Pattern, Respiratory unlabored;pattern regular   PRE-TX-O2   O2 Device (Oxygen Therapy) room air   SpO2 97 %   Pulse Oximetry Type Intermittent   $ Pulse Oximetry - Multiple Charge Pulse Oximetry - Multiple   Aerosol Therapy   $ Aerosol Therapy Charges PRN treatment not required   Education   $ Education DME Oxygen;15 min   Respiratory Evaluation   $ Care Plan Tech Time 15 min   $ Eval/Re-eval Charges Re-evaluation

## 2022-05-24 NOTE — PROGRESS NOTES
[facility]    Progress Note    Patient Name: John Martini  MRN: [id number]  Patient Class: OP- Observation   Admission Date: 5/20/2022  8:42 AM  Length of Stay: 0  Attending Physician: Joe Ibarra MD  Primary Care Provider: Eric Freeman MD  Face-to-Face encounter date: 05/24/2022  Code status:  Chief Complaint: Fatigue (Fatigue x 1 week with hematuria x 3 days ) and Hematuria        Subjective:    HPI: Jose Angel Zavaleta MD  John Martini is a 72 y.o. White male   With PMH of gastric bypass,  DM2, HTN, HLD,  who presents with generalized weakness.     He was admitted to [facility] for UTI from 5/13 to 5/15  He had generalized weakness before admission  He continues with physical deconditioning afterwards  He was discharged on augmentin  He now has diarrhea by report  However he hasn't had a BM in the ER yet today     He reports fever at home, Tmax 100.7  No fever in ER so far; no leukocytosis on CBC today  +nausea without vomiting  No dysuria (though he reported it to ER physician)  No abdominal pain  No diaphoresis  No SOB  No CP  Pt came to ER and got admitted    Interval History:   5/21:  Patient had another episode of diarrhea overnight , C diff negative occult positive.  H&H stable.  Abdominal CT to rule out intra-abdominal mass.  Patient has a history of bypass, would check B12, folate and iron levels. No concerns/issues overnight reported by the patient or the nursing staff.    5/22:  Diarrhea has resolved and H&H stable.  Iron levels low and was replaced IV Ferrlecit.  Abdominal CT was grossly unremarkable and kidney ultrasound showed complex cystic mass of left kidney contrast CT/MRI recommended.  Due to nationwide shortage of contrast, would order an MRI.  Blood pressure uncontrolled as patient's home medication of amlodipine and irbesartan was held, would resume Irbesartan  substituted for losartan and a home medication of amlodipine.  Patient  states that he would like to go to rehab, consult has been placed in for .    5/23:  Patient doing well, blood pressure is now better controlled since home medications have been resumed.  Awaiting rehab placement.  MRI is negative for renal ca.    Review of Systems All other Review of Systems were found to be negative expect for that mentioned already in HPI.     Objective:     Vitals:    05/24/22 0527 05/24/22 0708 05/24/22 0709 05/24/22 1218   BP: (!) 144/83 (!) 147/88 (!) 147/88 (!) 161/84   Pulse: (!) 54 (!) 51 (!) 51 (!) 52   Resp: 18 17 17 17   Temp: 97.9 °F (36.6 °C) 98.4 °F (36.9 °C) 98.4 °F (36.9 °C) 98.6 °F (37 °C)   TempSrc: Oral Oral Oral Oral   SpO2: (!) 94% (!) 93% (!) 93% (!) 93%   Weight:       Height:            Vitals reviewed.  Constitutional: No distress.   HENT: NC  Head: Atraumatic.   Cardiovascular: Normal rate, regular rhythm and normal heart sounds.   Pulmonary/Chest: Effort normal. No wheezes.   Abdominal: Soft. Bowel sounds are normal. No distension and no mass. No tenderness  Neurological: Alert.   Skin: Skin is warm and dry.   Psych: Appropriate mood and affect    Following labs were Reviewed   CBC:  No results for input(s): WBC, HGB, HCT, PLT in the last 24 hours.  CMP:  Recent Labs   Lab 05/24/22  0652   CALCIUM 8.5*   *   K 3.5   CO2 26   CL 97   BUN 9   CREATININE 0.9       Micro Results  Microbiology Results (last 7 days)     Procedure Component Value Units Date/Time    Blood culture x two cultures. Draw prior to antibiotics. [[id number]] Collected: 05/20/22 1029    Order Status: Completed Specimen: Blood from Peripheral, Upper Arm, Left Updated: 05/24/22 1232     Blood Culture, Routine No Growth to date      No Growth to date      No Growth to date      No Growth to date      No Growth to date    Narrative:      Aerobic and anaerobic    Blood culture x two cultures. Draw prior to antibiotics. [[id number]] Collected: 05/20/22 1202    Order Status: Completed Specimen:  Blood from Peripheral, Hand, Right Updated: 05/24/22 1232     Blood Culture, Routine No Growth to date      No Growth to date      No Growth to date      No Growth to date      No Growth to date    Narrative:      Aerobic and anaerobic    Stool culture [[id number]] Collected: 05/21/22 0754    Order Status: Completed Specimen: Stool Updated: 05/23/22 0722     Stool Culture No Salmonella,Shigella,Vibrio,Campylobacter.      No E coli 0157:H7 isolated.    Clostridium difficile EIA [[id number]] Collected: 05/21/22 0754    Order Status: Completed Specimen: Stool Updated: 05/21/22 0929     C. diff Antigen Negative     C difficile Toxins A+B, EIA Negative     Comment: Testing not recommended for children <24 months old.              Radiology Reports  X-Ray Chest AP Portable  Result Date: 5/20/2022  IMPRESSION: No evidence of active cardiopulmonary disease. Electronically signed by:  Martinez Berger MD  5/20/2022 9:36 AM CDT Workstation: [id number]       Meds  Scheduled Meds:   amLODIPine  5 mg Oral Daily    atorvastatin  40 mg Oral Nightly    enoxaparin  40 mg Subcutaneous Daily    gabapentin  300 mg Oral BID    insulin detemir U-100  15 Units Subcutaneous Daily    Lactobacillus acidoph-L.bulgar  4 tablet Oral TID WM    losartan  50 mg Oral Daily    pantoprazole  40 mg Oral Before breakfast     Continuous Infusions:    PRN Meds:.acetaminophen, albuterol-ipratropium, calcium chloride IVPB, calcium chloride IVPB, calcium chloride IVPB, dextrose 50 % in water (D50W), dextrose 50%, dextrose 50%, dextrose 50%, glucagon (human recombinant), glucose, glucose, hydrALAZINE, hydrALAZINE, HYDROcodone-acetaminophen, insulin aspart U-100, loperamide, magnesium oxide, magnesium sulfate IVPB, magnesium sulfate IVPB, magnesium sulfate IVPB, magnesium sulfate IVPB, melatonin, naloxone, ondansetron, polyethylene glycol, potassium chloride in water, potassium chloride in water, potassium chloride in water, potassium chloride in water,  potassium chloride, potassium chloride, potassium chloride, potassium chloride, senna-docusate 8.6-50 mg, simethicone, sodium chloride 0.9%, sodium phosphate IVPB, sodium phosphate IVPB, sodium phosphate IVPB, sodium phosphate IVPB, sodium phosphate IVPB.    Assessment & Plan:     Diarrhea  Resolved  C diff negative  Continue probiotics    Hypokalemia  Replace    Renal mass   Cystic mass, MRI ruled out Ca    HTN  Resume home medication of losartan and amlodipine    DM2  - basal insulin  - SSI     Physical Deconditioning  - PT/OT          Discharge Planning:   Is the patient medically ready for discharge?: yes    Reason for patient still in hospital (select all that apply): Patient trending condition and Treatment    Above encounter included review of the medical records, interviewing and examining the patient face-to-face, discussion with family and other health care providers, ordering and interpreting lab/test results and formulating a plan of care.     Medical Decision Making:      [_] Low Complexity  [_] Moderate Complexity  [x] High Complexity      Joe Ibarra MD  Department of Hospital Medicine   [facility]    Progress Note    Patient Name: John Martini  MRN: [id number]  Patient Class: OP- Observation   Admission Date: 5/20/2022  8:42 AM  Length of Stay: 0  Attending Physician: Joe Ibarra MD  Primary Care Provider: Eric Freeman MD  Face-to-Face encounter date: 05/24/2022  Code status:  Chief Complaint: Fatigue (Fatigue x 1 week with hematuria x 3 days ) and Hematuria        Subjective:    HPI: Jose Angel Zavaleta MD  John Martini is a 72 y.o. White male   With PMH of gastric bypass,  DM2, HTN, HLD,  who presents with generalized weakness.     He was admitted to [facility] for UTI from 5/13 to 5/15  He had generalized weakness before admission  He continues with physical deconditioning afterwards  He was discharged on  augmentin  He now has diarrhea by report  However he hasn't had a BM in the ER yet today     He reports fever at home, Tmax 100.7  No fever in ER so far; no leukocytosis on CBC today  +nausea without vomiting  No dysuria (though he reported it to ER physician)  No abdominal pain  No diaphoresis  No SOB  No CP  Pt came to ER and got admitted    Interval History:   5/21:  Patient had another episode of diarrhea overnight , C diff negative occult positive.  H&H stable.  Abdominal CT to rule out intra-abdominal mass.  Patient has a history of bypass, would check B12, folate and iron levels. No concerns/issues overnight reported by the patient or the nursing staff.    5/22:  Diarrhea has resolved and H&H stable.  Iron levels low and was replaced IV Ferrlecit.  Abdominal CT was grossly unremarkable and kidney ultrasound showed complex cystic mass of left kidney contrast CT/MRI recommended.  Due to nationwide shortage of contrast, would order an MRI.  Blood pressure uncontrolled as patient's home medication of amlodipine and irbesartan was held, would resume Irbesartan  substituted for losartan and a home medication of amlodipine.  Patient states that he would like to go to rehab, consult has been placed in for .    5/23:  Patient doing well, blood pressure is now better controlled since home medications have been resumed.  Awaiting rehab placement.  MRI is negative for renal ca.    5/24: Awaiting placement    Review of Systems All other Review of Systems were found to be negative expect for that mentioned already in HPI.     Objective:     Vitals:    05/24/22 0527 05/24/22 0708 05/24/22 0709 05/24/22 1218   BP: (!) 144/83 (!) 147/88 (!) 147/88 (!) 161/84   Pulse: (!) 54 (!) 51 (!) 51 (!) 52   Resp: 18 17 17 17   Temp: 97.9 °F (36.6 °C) 98.4 °F (36.9 °C) 98.4 °F (36.9 °C) 98.6 °F (37 °C)   TempSrc: Oral Oral Oral Oral   SpO2: (!) 94% (!) 93% (!) 93% (!) 93%   Weight:       Height:            Vitals  reviewed.  Constitutional: No distress.   HENT: NC  Head: Atraumatic.   Cardiovascular: Normal rate, regular rhythm and normal heart sounds.   Pulmonary/Chest: Effort normal. No wheezes.   Abdominal: Soft. Bowel sounds are normal. No distension and no mass. No tenderness  Neurological: Alert.   Skin: Skin is warm and dry.   Psych: Appropriate mood and affect    Following labs were Reviewed   CBC:  No results for input(s): WBC, HGB, HCT, PLT in the last 24 hours.  CMP:  Recent Labs   Lab 05/24/22  0652   CALCIUM 8.5*   *   K 3.5   CO2 26   CL 97   BUN 9   CREATININE 0.9       Micro Results  Microbiology Results (last 7 days)     Procedure Component Value Units Date/Time    Blood culture x two cultures. Draw prior to antibiotics. [[id number]] Collected: 05/20/22 1029    Order Status: Completed Specimen: Blood from Peripheral, Upper Arm, Left Updated: 05/24/22 1232     Blood Culture, Routine No Growth to date      No Growth to date      No Growth to date      No Growth to date      No Growth to date    Narrative:      Aerobic and anaerobic    Blood culture x two cultures. Draw prior to antibiotics. [[id number]] Collected: 05/20/22 1202    Order Status: Completed Specimen: Blood from Peripheral, Hand, Right Updated: 05/24/22 1232     Blood Culture, Routine No Growth to date      No Growth to date      No Growth to date      No Growth to date      No Growth to date    Narrative:      Aerobic and anaerobic    Stool culture [[id number]] Collected: 05/21/22 0754    Order Status: Completed Specimen: Stool Updated: 05/23/22 0722     Stool Culture No Salmonella,Shigella,Vibrio,Campylobacter.      No E coli 0157:H7 isolated.    Clostridium difficile EIA [[id number]] Collected: 05/21/22 0754    Order Status: Completed Specimen: Stool Updated: 05/21/22 0929     C. diff Antigen Negative     C difficile Toxins A+B, EIA Negative     Comment: Testing not recommended for children <24 months old.              Radiology Reports  X-Ray  Chest AP Portable  Result Date: 5/20/2022  IMPRESSION: No evidence of active cardiopulmonary disease. Electronically signed by:  Martinez Berger MD  5/20/2022 9:36 AM CDT Workstation: [id number]       Meds  Scheduled Meds:   amLODIPine  5 mg Oral Daily    atorvastatin  40 mg Oral Nightly    enoxaparin  40 mg Subcutaneous Daily    gabapentin  300 mg Oral BID    insulin detemir U-100  15 Units Subcutaneous Daily    Lactobacillus acidoph-L.bulgar  4 tablet Oral TID WM    losartan  50 mg Oral Daily    pantoprazole  40 mg Oral Before breakfast     Continuous Infusions:    PRN Meds:.acetaminophen, albuterol-ipratropium, calcium chloride IVPB, calcium chloride IVPB, calcium chloride IVPB, dextrose 50 % in water (D50W), dextrose 50%, dextrose 50%, dextrose 50%, glucagon (human recombinant), glucose, glucose, hydrALAZINE, hydrALAZINE, HYDROcodone-acetaminophen, insulin aspart U-100, loperamide, magnesium oxide, magnesium sulfate IVPB, magnesium sulfate IVPB, magnesium sulfate IVPB, magnesium sulfate IVPB, melatonin, naloxone, ondansetron, polyethylene glycol, potassium chloride in water, potassium chloride in water, potassium chloride in water, potassium chloride in water, potassium chloride, potassium chloride, potassium chloride, potassium chloride, senna-docusate 8.6-50 mg, simethicone, sodium chloride 0.9%, sodium phosphate IVPB, sodium phosphate IVPB, sodium phosphate IVPB, sodium phosphate IVPB, sodium phosphate IVPB.    Assessment & Plan:     Diarrhea  Resolved  C diff negative  Continue probiotics    Hypokalemia  Replace    Renal mass   Cystic mass, MRI ruled out Ca    HTN  Resume home medication of losartan and amlodipine    DM2  - basal insulin  - SSI     Physical Deconditioning  - PT/OT          Discharge Planning:   Is the patient medically ready for discharge?: yes    Reason for patient still in hospital (select all that apply): Patient trending condition and Treatment    Above encounter included review of  the medical records, interviewing and examining the patient face-to-face, discussion with family and other health care providers, ordering and interpreting lab/test results and formulating a plan of care.     Medical Decision Making:      [_] Low Complexity  [_] Moderate Complexity  [x] High Complexity      Joe Ibarra MD  Department of Hospital Medicine   [facility]

## 2022-05-24 NOTE — PT/OT/SLP PROGRESS
Occupational Therapy   Treatment    Name: John Martini  MRN: [id number]  Admitting Diagnosis:  Hypokalemia       Recommendations:     Discharge Recommendations: rehabilitation facility  Discharge Equipment Recommendations:   (TBD)  Barriers to discharge:  Decreased caregiver support    Assessment:     John Martini is a 72 y.o. male with a medical diagnosis of Hypokalemia.  He presents with improving medical acuity and functional mobility. Patient participated in LB dressing sitting in chair, functional transfer, ambulation in the hospital room and bathroom using rolling walker, grooming standing at sink and toilet hygiene. Performance deficits affecting function are weakness, impaired endurance, impaired self care skills, impaired functional mobilty, gait instability, impaired balance, impaired cardiopulmonary response to activity.     Rehab Prognosis:  Fair; patient would benefit from acute skilled OT services to address these deficits and reach maximum level of function.       Plan:     Patient to be seen 6 x/week to address the above listed problems via self-care/home management, therapeutic activities, therapeutic exercises  · Plan of Care Expires: 06/20/22  · Plan of Care Reviewed with: patient    Subjective     Pain/Comfort:  · Pain Rating 1: 0/10  · Pain Rating Post-Intervention 1: 0/10    Objective:     Communicated with: nurse prior to session.  Patient found up in chair with telemetry, peripheral IV upon OT entry to room.    General Precautions: Standard, fall   Orthopedic Precautions:N/A   Braces: N/A  Respiratory Status: Room air     Occupational Performance:     Functional Mobility/Transfers:  · Patient completed Sit <> Stand Transfer with contact guard assistance  with  rolling walker   · Patient completed Toilet Transfer Step Transfer technique with contact guard assistance with  rolling walker  · Functional Mobility: ambulated 20 feet in hospital room and bathroom using rolling  walker    Activities of Daily Living:  · Grooming: contact guard assistance to wash hands standing at sink.  · Lower Body Dressing: stand by assistance to don/doff socks sitting in chair.  · Toileting: contact guard assistance to perform toilet hygiene and clothing management from commode.      [facility] 6 Click ADL: 19    Treatment & Education:  Patient is making positive progress towards all OT goals.    Patient left up in chair with all lines intact and call button in reachEducation:      GOALS:   Multidisciplinary Problems     Occupational Therapy Goals        Problem: Occupational Therapy    Goal Priority Disciplines Outcome Interventions   Occupational Therapy Goal     OT, PT/OT Ongoing, Progressing    Description: Goals to be met by: d/c     Patient will increase functional independence with ADLs by performing:    UE Dressing with Supervision.  LE Dressing with Supervision and Assistive Devices as needed.  Grooming while standing at sink with Supervision.  Toileting from toilet with Supervision for hygiene and clothing management.   Toilet transfer to toilet with Supervision.                     Time Tracking:     OT Date of Treatment: 05/24/22  OT Start Time: 1027  OT Stop Time: 1038  OT Total Time (min): 11 min    Billable Minutes:Self Care/Home Management 11    OT/SIN: OT          5/24/2022

## 2022-05-24 NOTE — PT/OT/SLP PROGRESS
Physical Therapy Treatment    Patient Name:  John Martini   MRN:  [id number]    Recommendations:     Discharge Recommendations:  rehabilitation facility   Discharge Equipment Recommendations: other (see comments) (TBD)   Barriers to discharge: Increase assist with mobility     Assessment:     John Martini is a 72 y.o. male admitted with a medical diagnosis of Hypokalemia.  He presents with the following impairments/functional limitations:  weakness, impaired endurance, impaired self care skills, impaired functional mobilty, gait instability, impaired balance, decreased lower extremity function, decreased safety awareness, impaired cardiopulmonary response to activity.    Pt reported weakness and fatigue at the start of the session. Pt did not display SOB and O2 was WNL. Pt ambulated about 105 ft but demonstrated poor stride length, decrease heel strike and toe off, and pt can benefit from,ongoing gait training to improve gait pattern and safety. Pt demonstrated a decrease in knee extension ROM during seated exercises.     Rehab Prognosis: Fair; patient would benefit from acute skilled PT services to address these deficits and reach maximum level of function.    Recent Surgery: * No surgery found *      Plan:     During this hospitalization, patient to be seen daily to address the identified rehab impairments via gait training, therapeutic activities, therapeutic exercises, neuromuscular re-education and progress toward the following goals:    · Plan of Care Expires:  06/24/22    Subjective     Chief Complaint: weakness and fatigue   Patient/Family Comments/goals: none   Pain/Comfort:  · Pain Rating 1: 0/10      Objective:     Communicated with RN prior to session.  Patient found up in chair with telemetry, peripheral IV upon PT entry to room.     General Precautions: Standard, fall   Orthopedic Precautions:N/A   Braces: N/A  Respiratory Status: Room air     Functional Mobility:  · Transfers:     · Sit  to Stand:  contact guard assistance with rolling walker  · Gait: 105 ft CGA using rolling walker       AM-PAC 6 CLICK MOBILITY          Therapeutic Activities and Exercises:   Pt educated on POC, discharge recommendation, need for OOFB mobility, proper hand placement with STS with rolling walker, need for assist with mobility, use of call bell to seek assistance as needed and fall prevention    Pt tolerated seated TE including hip flexion, knee ext, and ankle df/pf  1 set of 20 each with min vi for proper form and pacing        Patient left up in chair with all lines intact, call button in reach and chair alarm on..    GOALS:   Multidisciplinary Problems     Physical Therapy Goals        Problem: Physical Therapy    Goal Priority Disciplines Outcome Goal Variances Interventions   Physical Therapy Goal     PT, PT/OT Ongoing, Progressing     Description: Goals to be met by: 6/3/22    Patient will increase functional independence with mobility by performin. Supine to sit with [place]  2. Sit to supine with [place]  3. Sit to stand transfer with Supervision  4. Bed to chair transfer with Stand-by Assistance using Rolling Walker  5. Gait  x 150 feet with Stand-by Assistance using Rolling Walker.                      Time Tracking:     PT Received On: 22  PT Start Time: 914     PT Stop Time: 926  PT Total Time (min): 12 min     Billable Minutes: Gait Training 12    Treatment Type: Treatment  PT/PTA: PT     PTA Visit Number: 0     2022

## 2022-05-24 NOTE — PLAN OF CARE
Per Jame with [facility]- the pts wife just called her and they have selected them for admit. She is going to submit for auth and will keep me updated on when it is approved. CM following.    05/24/22 1045   Post-Acute Status   Post-Acute Authorization Placement   Post-Acute Placement Status Pending payor review/awaiting authorization (if required)

## 2022-05-24 NOTE — PLAN OF CARE
"Pt is denied by [facility] and has referrals pending to [facility], [facility] and [facility]. I sent a secure chat to Freddy to check on the status at [facility]. CM following.     Per Jame with [facility]-" I spoke with Mr Martini and his wife. I gave them info about my rehab. they will consider us. I know several rehabs are consulted. please let me know which rehab they choose. the pt looks ready for DC, I will continue to follow. in case I do not answer this chat, I can be reached at [phone]. feel free to call or text"      05/24/22 0852   Post-Acute Status   Post-Acute Authorization Placement   Post-Acute Placement Status Referrals Sent     "

## 2022-05-25 VITALS
WEIGHT: 242 LBS | RESPIRATION RATE: 18 BRPM | OXYGEN SATURATION: 97 % | BODY MASS INDEX: 31.06 KG/M2 | DIASTOLIC BLOOD PRESSURE: 88 MMHG | TEMPERATURE: 98 F | HEIGHT: 74 IN | SYSTOLIC BLOOD PRESSURE: 159 MMHG | HEART RATE: 60 BPM

## 2022-05-25 PROBLEM — E87.6 HYPOKALEMIA: Status: RESOLVED | Noted: 2022-05-20 | Resolved: 2022-05-25

## 2022-05-25 PROBLEM — E86.0 DEHYDRATION: Status: RESOLVED | Noted: 2022-05-20 | Resolved: 2022-05-25

## 2022-05-25 PROBLEM — K59.1 FUNCTIONAL DIARRHEA: Status: RESOLVED | Noted: 2022-05-20 | Resolved: 2022-05-25

## 2022-05-25 PROBLEM — R53.1 GENERALIZED WEAKNESS: Status: RESOLVED | Noted: 2022-05-20 | Resolved: 2022-05-25

## 2022-05-25 LAB
BACTERIA BLD CULT: NORMAL
BACTERIA BLD CULT: NORMAL
GLUCOSE SERPL-MCNC: 134 MG/DL (ref 70–110)
GLUCOSE SERPL-MCNC: 154 MG/DL (ref 70–110)
GLUCOSE SERPL-MCNC: 156 MG/DL (ref 70–110)

## 2022-05-25 PROCEDURE — 97116 GAIT TRAINING THERAPY: CPT

## 2022-05-25 PROCEDURE — 94761 N-INVAS EAR/PLS OXIMETRY MLT: CPT

## 2022-05-25 PROCEDURE — 25000003 PHARM REV CODE 250: Performed by: STUDENT IN AN ORGANIZED HEALTH CARE EDUCATION/TRAINING PROGRAM

## 2022-05-25 PROCEDURE — 63600175 PHARM REV CODE 636 W HCPCS: Performed by: FAMILY MEDICINE

## 2022-05-25 PROCEDURE — 99900031 HC PATIENT EDUCATION (STAT)

## 2022-05-25 PROCEDURE — 25000003 PHARM REV CODE 250: Performed by: FAMILY MEDICINE

## 2022-05-25 PROCEDURE — G0378 HOSPITAL OBSERVATION PER HR: HCPCS

## 2022-05-25 PROCEDURE — 99900035 HC TECH TIME PER 15 MIN (STAT)

## 2022-05-25 PROCEDURE — 96372 THER/PROPH/DIAG INJ SC/IM: CPT | Mod: 59 | Performed by: FAMILY MEDICINE

## 2022-05-25 PROCEDURE — 82962 GLUCOSE BLOOD TEST: CPT | Mod: 91

## 2022-05-25 PROCEDURE — 97535 SELF CARE MNGMENT TRAINING: CPT

## 2022-05-25 RX ORDER — L. ACIDOPHILUS/LACTOBAC SPOR 35MM-25MM
1 TABLET ORAL DAILY
Qty: 30 TABLET | Refills: 0 | Status: SHIPPED | OUTPATIENT
Start: 2022-05-25 | End: 2022-08-24

## 2022-05-25 RX ADMIN — HYDROCODONE BITARTRATE AND ACETAMINOPHEN 1 TABLET: 5; 325 TABLET ORAL at 04:05

## 2022-05-25 RX ADMIN — LACTOBACILLUS TAB 4 TABLET: TAB at 08:05

## 2022-05-25 RX ADMIN — ENOXAPARIN SODIUM 40 MG: 40 INJECTION SUBCUTANEOUS at 04:05

## 2022-05-25 RX ADMIN — PANTOPRAZOLE SODIUM 40 MG: 40 TABLET, DELAYED RELEASE ORAL at 05:05

## 2022-05-25 RX ADMIN — INSULIN DETEMIR 15 UNITS: 100 INJECTION, SOLUTION SUBCUTANEOUS at 08:05

## 2022-05-25 RX ADMIN — LACTOBACILLUS TAB 4 TABLET: TAB at 04:05

## 2022-05-25 RX ADMIN — INSULIN ASPART 2 UNITS: 100 INJECTION, SOLUTION INTRAVENOUS; SUBCUTANEOUS at 12:05

## 2022-05-25 RX ADMIN — GABAPENTIN 300 MG: 300 CAPSULE ORAL at 08:05

## 2022-05-25 RX ADMIN — AMLODIPINE BESYLATE 5 MG: 5 TABLET ORAL at 08:05

## 2022-05-25 RX ADMIN — LACTOBACILLUS TAB 4 TABLET: TAB at 12:05

## 2022-05-25 RX ADMIN — LOSARTAN POTASSIUM 50 MG: 50 TABLET, FILM COATED ORAL at 08:05

## 2022-05-25 NOTE — PT/OT/SLP PROGRESS
Occupational Therapy   Treatment    Name: John Martini  MRN: [id number]  Admitting Diagnosis:  Hypokalemia       Recommendations:     Discharge Recommendations: rehabilitation facility  Discharge Equipment Recommendations:   (TBD)  Barriers to discharge:  Decreased caregiver support    Assessment:     John Martini is a 72 y.o. male with a medical diagnosis of Hypokalemia.  He presents with improving medical acuity and functional mobility. Patient participated in LB dressing sitting in chair, toilet transfer, grooming standing at sink and ambulation in the hospital bathroom and room using rolling walker. Performance deficits affecting function are weakness, impaired endurance, impaired self care skills, impaired functional mobilty, gait instability, impaired balance, decreased safety awareness, impaired cardiopulmonary response to activity.     Rehab Prognosis:  Fair; patient would benefit from acute skilled OT services to address these deficits and reach maximum level of function.       Plan:     Patient to be seen 6 x/week to address the above listed problems via self-care/home management, therapeutic activities, therapeutic exercises  · Plan of Care Expires: 06/20/22  · Plan of Care Reviewed with: patient, spouse    Subjective     Pain/Comfort:  · Pain Rating 1: 0/10  · Pain Rating Post-Intervention 1: 0/10    Objective:     Communicated with: nurse prior to session.  Patient found up in chair with telemetry, peripheral IV upon OT entry to room.    General Precautions: Standard, fall   Orthopedic Precautions:N/A   Braces: N/A  Respiratory Status: Room air     Occupational Performance:     Functional Mobility/Transfers:  · Patient completed Sit <> Stand Transfer with contact guard assistance  with  rolling walker   · Patient completed Toilet Transfer Step Transfer technique with contact guard assistance with  rolling walker  · Functional Mobility: ambulated 20 feet in hospital room and bathroom with  contact guard assistance using rolling walker.    Activities of Daily Living:  · Grooming: contact guard assistance to wash hands standing at sink.  · Lower Body Dressing: stand by assistance to don/doff socks sittting in chair.       [facility] 6 Click ADL:      Treatment & Education:  Patient has 2 slight losses of balances while standing at sink and ambulating in room using rolling walker. Patient fatigues easily during standing activity. Patient would benefit from inpatient rehab to increase endurance to safely perform standing ADL activity and functional mobility.     Patient left up in chair with all lines intact, call button in reach and spouse presentEducation:      GOALS:   Multidisciplinary Problems     Occupational Therapy Goals        Problem: Occupational Therapy    Goal Priority Disciplines Outcome Interventions   Occupational Therapy Goal     OT, PT/OT Ongoing, Progressing    Description: Goals to be met by: d/c     Patient will increase functional independence with ADLs by performing:    UE Dressing with Supervision.  LE Dressing with Supervision and Assistive Devices as needed.  Grooming while standing at sink with Supervision.  Toileting from toilet with Supervision for hygiene and clothing management.   Toilet transfer to toilet with Supervision.                     Time Tracking:     OT Date of Treatment: 05/25/22  OT Start Time: 1100  OT Stop Time: 1123  OT Total Time (min): 23 min    Billable Minutes:Self Care/Home Management 23    OT/SIN: OT          5/25/2022

## 2022-05-25 NOTE — PT/OT/SLP PROGRESS
Physical Therapy Treatment    Patient Name:  John Martini   MRN:  [id number]    Recommendations:     Discharge Recommendations:  rehabilitation facility   Discharge Equipment Recommendations: other (see comments) (TBD)   Barriers to discharge: Increase assist with mobility     Assessment:     John Martini is a 72 y.o. male admitted with a medical diagnosis of Hypokalemia.  He presents with the following impairments/functional limitations:  weakness, impaired endurance, impaired self care skills, impaired functional mobilty, gait instability, impaired balance, decreased safety awareness, decreased lower extremity function, impaired cardiopulmonary response to activity .    Pt ambulated 125 ft with improved quality of gait and distance today. Pt demonstrated increased toe off during gait and increase stride length allowing for increase gait speed. Pt reported a slight headache after ambulation. Pt improved overall with gait.      Rehab Prognosis: Fair; patient would benefit from acute skilled PT services to address these deficits and reach maximum level of function.    Recent Surgery: * No surgery found *      Plan:     During this hospitalization, patient to be seen daily to address the identified rehab impairments via gait training, therapeutic activities, therapeutic exercises, neuromuscular re-education and progress toward the following goals:    · Plan of Care Expires:  06/25/22    Subjective     Chief Complaint: slight headache post ambulation   Patient/Family Comments/goals: go to rehab   Pain/Comfort:  · Pain Rating 1: 0/10      Objective:     Communicated with RN prior to session.  Patient found HOB elevated with telemetry, peripheral IV upon PT entry to room.     General Precautions: Standard, fall   Orthopedic Precautions:N/A   Braces: N/A  Respiratory Status: Room air     Functional Mobility:  · Bed Mobility:  Supine to Sit: contact guard assistance  · Transfers:     · Sit to Stand:  contact  guard assistance with rolling walker  · Gait: 125 ft CGA using rolling walker       AM-PAC 6 CLICK MOBILITY          Therapeutic Activities and Exercises:   Pt educated on POC, discharge recommendation, need for assist with mobility, importance of OOB mobility to improve gait pattern and speed, use of call bell to seek assistance as needed and fall prevention    Pt tolerated seated TE including hip flexion, ankle df/pf 1 set of 20 each with min vi for proper form and pacing.       Patient left up in chair with all lines intact, call button in reach and chair alarm on..    GOALS:   Multidisciplinary Problems     Physical Therapy Goals        Problem: Physical Therapy    Goal Priority Disciplines Outcome Goal Variances Interventions   Physical Therapy Goal     PT, PT/OT Ongoing, Progressing     Description: Goals to be met by: 6/3/22    Patient will increase functional independence with mobility by performin. Supine to sit with [place]  2. Sit to supine with [place]  3. Sit to stand transfer with Supervision  4. Bed to chair transfer with Stand-by Assistance using Rolling Walker  5. Gait  x 150 feet with Stand-by Assistance using Rolling Walker.                      Time Tracking:     PT Received On: 22  PT Start Time: 937     PT Stop Time: 948  PT Total Time (min): 11 min     Billable Minutes: Gait Training 11    Treatment Type: Treatment  PT/PTA: PT     PTA Visit Number: 0     2022

## 2022-05-25 NOTE — DISCHARGE SUMMARY
[facility] Medicine  Discharge Summary      Patient Name: John Martini  MRN: [id number]  Patient Class: OP- Observation  Admission Date: 5/20/2022  Hospital Length of Stay: 0 days  Discharge Date and Time:  05/25/2022 4:24 PM  Attending Physician: oJe Ibarra MD   Discharging Provider: Joe Ibarra MD  Primary Care Provider: Eric Freeman MD      HPI:   Jose Angel Zavaleta MD  John Martini is a 72 y.o. White male   With PMH of gastric bypass,  DM2, HTN, HLD,  who presents with generalized weakness.     He was admitted to [facility] for UTI from 5/13 to 5/15  He had generalized weakness before admission  He continues with physical deconditioning afterwards  He was discharged on augmentin  He now has diarrhea by report  However he hasn't had a BM in the ER yet today     He reports fever at home, Tmax 100.7  No fever in ER so far; no leukocytosis on CBC today  +nausea without vomiting  No dysuria (though he reported it to ER physician)  No abdominal pain  No diaphoresis  No SOB  No CP  Pt came to ER and got admitted  * No surgery found *      Hospital Course:    5/21:  Patient had another episode of diarrhea overnight , C diff negative occult positive.  H&H stable.  Abdominal CT to rule out intra-abdominal mass.  Patient has a history of bypass, would check B12, folate and iron levels. No concerns/issues overnight reported by the patient or the nursing staff.     5/22:  Diarrhea has resolved and H&H stable.  Iron levels low and was replaced IV Ferrlecit.  Abdominal CT was grossly unremarkable and kidney ultrasound showed complex cystic mass of left kidney contrast CT/MRI recommended.  Due to nationwide shortage of contrast, would order an MRI.  Blood pressure uncontrolled as patient's home medication of amlodipine and irbesartan was held, would resume Irbesartan  substituted for losartan and a home medication of amlodipine.  Patient states that he would like to go to  rehab, consult has been placed in for .     5/23:  Patient doing well, blood pressure is now better controlled since home medications have been resumed.  Awaiting rehab placement.  MRI is negative for renal ca.    5/24: awaiting placement    5/25: insurance denied placement and patient subsequently discharged home on HH to F/U with PCP.    Physical examination on discharge:  Constitutional: No distress.   HENT: NC  Head: Atraumatic.   Cardiovascular: Normal rate, regular rhythm and normal heart sounds.   Pulmonary/Chest: Effort normal. No wheezes.   Abdominal: Soft. Bowel sounds are normal. No distension and no mass. No tenderness  Neurological: Alert.   Skin: Skin is warm and dry.   Psych: Appropriate mood and affect        Goals of Care Treatment Preferences:  Code Status: Full Code      Consults:   Consults (From admission, onward)        Status Ordering Provider     Inpatient consult to Social Work/Case Management  Once        Provider:  (Not yet assigned)    MARGE Sommers     Inpatient consult to Hospitalist  Once        Provider:  Stacie Pierson MD    Acknowledged STACIE PIERSON          No new Assessment & Plan notes have been filed under this hospital service since the last note was generated.  Service: Hospital Medicine    Final Active Diagnoses:    Diagnosis Date Noted POA    Left renal mass [N28.89] 05/22/2022 Yes    Physical deconditioning [R53.81] 05/20/2022 Yes    DM2 (diabetes mellitus, type 2) [E11.9] 05/20/2022 Yes    GERD (gastroesophageal reflux disease) [K21.9] 05/20/2022 Yes    HTN (hypertension) [I10] 05/20/2022 Yes    Hyperthyroidism [E05.90] 05/20/2022 Yes      Problems Resolved During this Admission:    Diagnosis Date Noted Date Resolved POA    PRINCIPAL PROBLEM:  Hypokalemia [E87.6] 05/20/2022 05/25/2022 Yes    Dehydration [E86.0] 05/20/2022 05/25/2022 Yes    Generalized weakness [R53.1] 05/20/2022 05/25/2022 Yes    Functional diarrhea, after  "antibiotic usage [K59.1] 05/20/2022 05/25/2022 Yes       Discharged Condition: good    Disposition: Home-Health Care Svc    Follow Up:   Follow-up Information     Eric Freeman MD Follow up in 1 week(s).    Specialties: Internal Medicine, Hospice Services, Home Health Services  Contact information:  [address] [place] [address]  [phone]                       Patient Instructions:      WALKER FOR HOME USE     Order Specific Question Answer Comments   Type of Walker: Adult (5'4"-6'6")    With wheels? Yes    Height: 6' 2" (1.88 m)    Weight: 109.8 kg (242 lb)    Length of need (1-99 months): 99    Does patient have medical equipment at home? shower chair    Does patient have medical equipment at home? raised toilet    Please check all that apply: Patient's condition impairs ambulation.      Ambulatory referral/consult to Home Health   Standing Status: Future   Referral Priority: Routine Referral Type: Home Health   Referral Reason: Specialty Services Required   Requested Specialty: Home Health Services   Number of Visits Requested: 1     Activity as tolerated       Significant Diagnostic Studies: Labs:   BMP:   Recent Labs   Lab 05/24/22  0652   *   *   K 3.5   CL 97   CO2 26   BUN 9   CREATININE 0.9   CALCIUM 8.5*   MG 1.9   , CMP   Recent Labs   Lab 05/24/22  0652   *   K 3.5   CL 97   CO2 26   *   BUN 9   CREATININE 0.9   CALCIUM 8.5*   ANIONGAP 11   ESTGFRAFRICA >60.0   EGFRNONAA >60.0   , CBC No results for input(s): WBC, HGB, HCT, PLT in the last 48 hours., Troponin   Recent Labs   Lab 05/20/22  1029 05/20/22  1623 05/20/22  2017   TROPONINI 0.033 0.030 <0.030    and All labs within the past 24 hours have been reviewed  Microbiology:   Blood Culture   Lab Results   Component Value Date    LABBLOO No growth after 5 days. 05/20/2022     Radiology: X-Ray: CXR: X-Ray Chest 1 View (CXR): No results found for this visit on 05/20/22. and X-Ray Chest PA and Lateral (CXR): No " results found for this visit on 05/20/22.  CT scan: CT ABDOMEN PELVIS WITH CONTRAST: No results found for this visit on 05/20/22. and CT ABDOMEN PELVIS WITHOUT CONTRAST:   Results for orders placed or performed during the hospital encounter of 05/20/22   CT Abdomen Pelvis  Without Contrast    Narrative    CMS MANDATED QUALITY DATA - CT RADIATION - 436    All CT scans at this facility utilize dose modulation, iterative reconstruction, and/or weight based dosing when appropriate to reduce radiation dose to as low as reasonably achievable.        Reason: BLOODY STOOLS    TECHNIQUE: CT abdomen and pelvis with 100 mL Omnipaque 350.    COMPARISON: None    FINDINGS:    Trace bilateral pleural effusions noted. Heart size is normal.    Liver is normal size. No hepatic lesions identified. The gallbladder and common bile duct are unremarkable. The pancreas, spleen and adrenal glands are unremarkable. Kidneys are normal size. There is bilateral nonspecific perinephric fat stranding. There is no hydronephrosis or nephrolithiasis. Hypoattenuating structure at the inferior pole of the left kidney measuring approximately 2.2 cm in diameter noted. The ureters are normal caliber without obstructions. The bladder is fluid-filled with no focal wall abnormalities. Coarse calcifications of the central prostate gland noted. No free fluid in the pelvis.    Large and small bowel are normal caliber. There is no bowel wall thickening or inflammatory changes. The appendix is normal. Oral contrast noted in the small bowel. Postsurgical changes of gastric bypass surgery noted.    The abdominal aorta is normal caliber with atherosclerosis. There is no mesenteric fat stranding or free fluid. The ventral abdominal wall is unremarkable. Degenerative changes of the spine noted.    IMPRESSION:    1.  Postsurgical changes of gastric bypass surgery noted. Large and small bowel are otherwise grossly unremarkable.  2.  Trace bilateral pleural  effusions.  3.  Hypoattenuating structure at the inferior pole the left kidney measures approximately 2.2 cm in diameter. Further evaluation with ultrasound recommended.    Electronically signed by:  Wai Rodriguez DO  5/21/2022 3:41 PM CDT Workstation: [id number]       Pending Diagnostic Studies:     Procedure Component Value Units Date/Time    Stool Exam-Ova,Cysts,Parasites [[id number]] Collected: 05/21/22 0754    Order Status: Sent Lab Status: In process Updated: 05/21/22 0809    Specimen: Stool          Medications:  Reconciled Home Medications:      Medication List      START taking these medications    ACIDOPHILUS EX STR (L. SPOROG) 35 million- 25 million cell Tab  Generic drug: acidophilus-sporogenes  Take 1 tablet by mouth once daily.        CHANGE how you take these medications    gabapentin 300 MG capsule  Commonly known as: NEURONTIN  TAKE 1 CAPSULE IN THE MORNING AND 2 CAPSULES AT BEDTIME  What changed: Another medication with the same name was removed. Continue taking this medication, and follow the directions you see here.     TRESIBA FLEXTOUCH U-200 200 unit/mL (3 mL) insulin pen  Generic drug: insulin degludec  INJECT 26 UNITS UNDER THE SKIN AT BEDTIME  What changed: See the new instructions.        CONTINUE taking these medications    amLODIPine 5 MG tablet  Commonly known as: NORVASC  TAKE 1 TABLET DAILY     atorvastatin 40 MG tablet  Commonly known as: LIPITOR  TAKE 1 TABLET AT BEDTIME     blood sugar diagnostic Strp  Commonly known as: ONETOUCH VERIO TEST STRIPS  USE 1 STRIP VIA METER TWICE A DAY AS DIRECTED     GLYXAMBI 25-5 mg Tab  Generic drug: empagliflozin-linagliptin  TAKE 1 TABLET EVERY MORNING     irbesartan 150 MG tablet  Commonly known as: AVAPRO  TAKE 1 TABLET DAILY     levothyroxine 137 MCG Tab tablet  Commonly known as: SYNTHROID  Take 2 tablets by mouth on an empty stomach every Sunday     loratadine 10 mg tablet  Commonly known as: CLARITIN  Take 10 mg by mouth once daily.    "  metFORMIN 500 MG ER 24hr tablet  Commonly known as: GLUCOPHAGE-XR  TAKE 3 TABLETS EVERY EVENING     MULTIPLE VITAMINS DAILY ORAL  Take 1 tablet by mouth once daily.     pantoprazole 40 MG tablet  Commonly known as: PROTONIX  TAKE 1 TABLET DAILY     pen needle, diabetic 31 gauge x 5/16" Ndle  Commonly known as: BD ULTRA-FINE SHORT PEN NEEDLE  Use to administer Tresiba every night        STOP taking these medications    ammonium lactate 12 % lotion  Commonly known as: LAC-HYDRIN     amoxicillin-clavulanate 875-125mg 875-125 mg per tablet  Commonly known as: AUGMENTIN     aspirin 81 MG EC tablet  Commonly known as: ECOTRIN     calcium-vitamin D3-vitamin K 650 mg-12.5 mcg-40 mcg Chew     ferrous sulfate 325 mg (65 mg iron) Tab tablet  Commonly known as: FEOSOL     mupirocin 2 % ointment  Commonly known as: BACTROBAN            Indwelling Lines/Drains at time of discharge:   Lines/Drains/Airways     None                 Time spent on the discharge of patient: 35 minutes         Joe Ibarra MD  Department of Hospital Medicine  [facility]ly [facility], later [facility]  "

## 2022-05-25 NOTE — PLAN OF CARE
packet sent to [facility] per patient choice.    Patient choice form signed and scanned into media.       05/25/22 6816   Post-Acute Status   Post-Acute Authorization Home Health   Post-Acute Placement Status Referrals Sent   Patient choice form signed by patient/caregiver List with quality metrics by geographic area provided

## 2022-05-25 NOTE — PLAN OF CARE
Patient cleared for discharge from case management.  Patient to dc home with [facility]. Transported home by family.       05/25/22 1648   Final Note   Assessment Type Final Discharge Note   Anticipated Discharge Disposition Home-Health   What phone number can be called within the next 1-3 days to see how you are doing after discharge? [phone]   Hospital Resources/Appts/Education Provided Appointments scheduled and added to AVS

## 2022-05-25 NOTE — NURSING
Discharge paperwork reviewed with and given to patient & spouse. Verbalized understanding. IV removed without difficulty, catheter intact. Discharged off unit via wheelchair in stable condition to personal vehicle.

## 2022-05-25 NOTE — PLAN OF CARE
3:56pm-  received secure chat from Leila. Patient denied rehab placement ref# [id number]. Leila inquired about peer to peer. MD notified. Patient has improved with physical therapy and will dc home with HH.      spoke with patient and spouse present at the facility. Both verbalized understanding of rehab denial and will dc home with .       05/25/22 1615   Post-Acute Status   Post-Acute Authorization Placement   Post-Acute Placement Status Discharge Plan Changed

## 2022-05-25 NOTE — CARE UPDATE
05/25/22 0819   Patient Assessment/Suction   Level of Consciousness (AVPU) alert   Respiratory Effort Normal;Unlabored   Expansion/Accessory Muscles/Retractions no use of accessory muscles;no retractions;expansion symmetric   All Lung Fields Breath Sounds clear   Rhythm/Pattern, Respiratory unlabored;pattern regular;depth regular;chest wiggle adequate;no shortness of breath reported   Cough Frequency no cough   PRE-TX-O2   O2 Device (Oxygen Therapy) room air   SpO2 95 %   Pulse Oximetry Type Intermittent   $ Pulse Oximetry - Multiple Charge Pulse Oximetry - Multiple   Pulse 60   Resp 18   Aerosol Therapy   $ Aerosol Therapy Charges PRN treatment not required   Education   $ Education Bronchodilator;15 min   Respiratory Evaluation   $ Care Plan Tech Time 15 min

## 2022-05-26 NOTE — PT/OT/SLP DISCHARGE
Occupational Therapy Discharge Summary    John Martini  MRN: [id number]   Principal Problem: Hypokalemia      Patient Discharged from acute Occupational Therapy on 5/25/2022.  Please refer to prior OT note dated 5/25/2022 for functional status.    Assessment:      Patient has not met goals.    Objective:     GOALS:   Multidisciplinary Problems     Occupational Therapy Goals     Not on file          Multidisciplinary Problems (Resolved)        Problem: Occupational Therapy    Goal Priority Disciplines Outcome Interventions   Occupational Therapy Goal   (Resolved)     OT, PT/OT Met    Description: Goals to be met by: d/c     Patient will increase functional independence with ADLs by performing:    UE Dressing with Supervision.  LE Dressing with Supervision and Assistive Devices as needed.  Grooming while standing at sink with Supervision.  Toileting from toilet with Supervision for hygiene and clothing management.   Toilet transfer to toilet with Supervision.                     Reasons for Discontinuation of Therapy Services  Patient d/c home.      Plan:     Patient Discharged to: Home with Home Health Service    5/26/2022

## 2022-05-26 NOTE — PLAN OF CARE
Patient accepted for  services with [facility] and will be admitted 5/26 or 5/27.       05/26/22 0952   Post-Acute Status   Post-Acute Authorization Home Health   Home Health Status Set-up Complete/Auth obtained

## 2022-05-28 LAB
O+P SPEC MICRO: NORMAL
O+P STL CONC: NORMAL

## 2022-05-31 ENCOUNTER — OFFICE VISIT (OUTPATIENT)
Dept: FAMILY MEDICINE | Facility: CLINIC | Age: 72
End: 2022-05-31
Payer: MEDICARE

## 2022-05-31 VITALS
WEIGHT: 242 LBS | DIASTOLIC BLOOD PRESSURE: 76 MMHG | TEMPERATURE: 99 F | HEART RATE: 61 BPM | OXYGEN SATURATION: 98 % | HEIGHT: 74 IN | BODY MASS INDEX: 31.06 KG/M2 | SYSTOLIC BLOOD PRESSURE: 122 MMHG

## 2022-05-31 DIAGNOSIS — R39.14 BENIGN PROSTATIC HYPERPLASIA WITH INCOMPLETE BLADDER EMPTYING: ICD-10-CM

## 2022-05-31 DIAGNOSIS — E11.9 DIABETES MELLITUS WITHOUT COMPLICATION: ICD-10-CM

## 2022-05-31 DIAGNOSIS — N30.01 ACUTE CYSTITIS WITH HEMATURIA: ICD-10-CM

## 2022-05-31 DIAGNOSIS — I10 HTN, GOAL BELOW 130/80: ICD-10-CM

## 2022-05-31 DIAGNOSIS — E87.6 HYPOKALEMIA: Primary | ICD-10-CM

## 2022-05-31 DIAGNOSIS — E78.00 HYPERCHOLESTEROLEMIA: ICD-10-CM

## 2022-05-31 DIAGNOSIS — N40.1 BENIGN PROSTATIC HYPERPLASIA WITH INCOMPLETE BLADDER EMPTYING: ICD-10-CM

## 2022-05-31 LAB — POTASSIUM SERPL-SCNC: 4.1 MMOL/L (ref 3.5–5.1)

## 2022-05-31 PROCEDURE — 3078F DIAST BP <80 MM HG: CPT | Mod: CPTII,S$GLB,, | Performed by: INTERNAL MEDICINE

## 2022-05-31 PROCEDURE — 1159F MED LIST DOCD IN RCRD: CPT | Mod: CPTII,S$GLB,, | Performed by: INTERNAL MEDICINE

## 2022-05-31 PROCEDURE — 99214 OFFICE O/P EST MOD 30 MIN: CPT | Mod: S$GLB,,, | Performed by: INTERNAL MEDICINE

## 2022-05-31 PROCEDURE — 3288F PR FALLS RISK ASSESSMENT DOCUMENTED: ICD-10-PCS | Mod: CPTII,S$GLB,, | Performed by: INTERNAL MEDICINE

## 2022-05-31 PROCEDURE — 3052F HG A1C>EQUAL 8.0%<EQUAL 9.0%: CPT | Mod: CPTII,S$GLB,, | Performed by: INTERNAL MEDICINE

## 2022-05-31 PROCEDURE — 1159F PR MEDICATION LIST DOCUMENTED IN MEDICAL RECORD: ICD-10-PCS | Mod: CPTII,S$GLB,, | Performed by: INTERNAL MEDICINE

## 2022-05-31 PROCEDURE — 3074F SYST BP LT 130 MM HG: CPT | Mod: CPTII,S$GLB,, | Performed by: INTERNAL MEDICINE

## 2022-05-31 PROCEDURE — 4010F ACE/ARB THERAPY RXD/TAKEN: CPT | Mod: CPTII,S$GLB,, | Performed by: INTERNAL MEDICINE

## 2022-05-31 PROCEDURE — 4010F PR ACE/ARB THEARPY RXD/TAKEN: ICD-10-PCS | Mod: CPTII,S$GLB,, | Performed by: INTERNAL MEDICINE

## 2022-05-31 PROCEDURE — 1101F PR PT FALLS ASSESS DOC 0-1 FALLS W/OUT INJ PAST YR: ICD-10-PCS | Mod: CPTII,S$GLB,, | Performed by: INTERNAL MEDICINE

## 2022-05-31 PROCEDURE — 99214 PR OFFICE/OUTPT VISIT, EST, LEVL IV, 30-39 MIN: ICD-10-PCS | Mod: S$GLB,,, | Performed by: INTERNAL MEDICINE

## 2022-05-31 PROCEDURE — 3078F PR MOST RECENT DIASTOLIC BLOOD PRESSURE < 80 MM HG: ICD-10-PCS | Mod: CPTII,S$GLB,, | Performed by: INTERNAL MEDICINE

## 2022-05-31 PROCEDURE — 1125F PR PAIN SEVERITY QUANTIFIED, PAIN PRESENT: ICD-10-PCS | Mod: CPTII,S$GLB,, | Performed by: INTERNAL MEDICINE

## 2022-05-31 PROCEDURE — 3052F PR MOST RECENT HEMOGLOBIN A1C LEVEL 8.0 - < 9.0%: ICD-10-PCS | Mod: CPTII,S$GLB,, | Performed by: INTERNAL MEDICINE

## 2022-05-31 PROCEDURE — 1125F AMNT PAIN NOTED PAIN PRSNT: CPT | Mod: CPTII,S$GLB,, | Performed by: INTERNAL MEDICINE

## 2022-05-31 PROCEDURE — 3008F PR BODY MASS INDEX (BMI) DOCUMENTED: ICD-10-PCS | Mod: CPTII,S$GLB,, | Performed by: INTERNAL MEDICINE

## 2022-05-31 PROCEDURE — 3008F BODY MASS INDEX DOCD: CPT | Mod: CPTII,S$GLB,, | Performed by: INTERNAL MEDICINE

## 2022-05-31 PROCEDURE — 3074F PR MOST RECENT SYSTOLIC BLOOD PRESSURE < 130 MM HG: ICD-10-PCS | Mod: CPTII,S$GLB,, | Performed by: INTERNAL MEDICINE

## 2022-05-31 PROCEDURE — 1101F PT FALLS ASSESS-DOCD LE1/YR: CPT | Mod: CPTII,S$GLB,, | Performed by: INTERNAL MEDICINE

## 2022-05-31 PROCEDURE — 84132 ASSAY OF SERUM POTASSIUM: CPT | Performed by: INTERNAL MEDICINE

## 2022-05-31 PROCEDURE — 3288F FALL RISK ASSESSMENT DOCD: CPT | Mod: CPTII,S$GLB,, | Performed by: INTERNAL MEDICINE

## 2022-05-31 RX ORDER — TAMSULOSIN HYDROCHLORIDE 0.4 MG/1
CAPSULE ORAL
Qty: 30 CAPSULE | Refills: 11 | Status: SHIPPED | OUTPATIENT
Start: 2022-05-31 | End: 2022-08-08

## 2022-05-31 NOTE — PROGRESS NOTES
Transitional Care Note  Subjective:       Patient ID: John Martini is a 72 y.o. male.  Chief Complaint: Follow-up (Patient went to [place] on 5/13/2022. Patient was recently hospitalized for Hypokalemia at [facility] appr 5-6 days. Patient is still feeling weak and complains of back pain. ), PES - Care Gap (Lipid Panel is due in Sept 2022. Diabetic Urine Screening due in Aug 2022. Patient would like to discuss screening for colonoscopy. ), and Transitional Care (Patient went to [place] on 5/13/2022. Patient was recently hospitalized for Hypokalemia at [facility] appr 5-6 days. Patient is still feeling weak and complains of back pain. )    Family and/or Caretaker present at visit?  Yes.  Diagnostic tests reviewed/disposition: I have reviewed all completed as well as pending diagnostic tests at the time of discharge.  Disease/illness education: No  Home health/community services discussion/referrals: Patient has home health established at [facility] [place].   Establishment or re-establishment of referral orders for community resources: No other necessary community resources.   Discussion with other health care providers: No discussion with other health care providers necessary.   I saw for the chief complaint patient hospitalized and treated for a complicated urinary tract infection and significant hypokalemia.  On the time of discharge last week his potassium was back up to 3.5 which is the lower limit of normal.  Our goal is to be able to achieve a potassium level in the 4.5 to 5 range above the frame to repeat serum potassium level here today.    Patient considers himself no back to his baseline yet as it pertains to his feeling of strength and mobility.  According to him and the wife of the family's peak of the illness he barely could even get up and walk.  He was treated with antibiotic therapy, electrolyte supplementation on fluid resuscitation.  There are no signs of complication from antibiotic therapy.    Patient is considered  to be back to his baseline central nervous system wise otherwise.  He remains cardiovascularly intact and stable.    Is experiencing what appears to be an obstructive uropathy reason why the Flomax will be started school 0.4 mg every day and a prescription will be seen by me today.  He is to take this at bedtime.    He is well-versed on the management of his diabetes and no medication changes are needed at this time..  No medication refills are needed.  He has enough testing supplies at home.    Review of Systems   All other systems reviewed and are negative.      Objective:      Physical Exam  Vitals reviewed. Exam conducted with a chaperone present.   Constitutional:       General: He is not in acute distress.     Appearance: Normal appearance. He is obese. He is not ill-appearing, toxic-appearing or diaphoretic.   HENT:      Head: Normocephalic.   Cardiovascular:      Rate and Rhythm: Normal rate and regular rhythm.      Pulses: Normal pulses.      Heart sounds: Normal heart sounds.   Pulmonary:      Effort: Pulmonary effort is normal.   Musculoskeletal:         General: No swelling.   Skin:     General: Skin is warm and dry.      Capillary Refill: Capillary refill takes less than 2 seconds.      Coloration: Skin is not jaundiced or pale.   Neurological:      General: No focal deficit present.      Mental Status: He is alert and oriented to person, place, and time. Mental status is at baseline.      Cranial Nerves: No cranial nerve deficit.      Sensory: No sensory deficit.      Motor: No weakness.      Coordination: Coordination normal.      Gait: Gait normal.   Psychiatric:         Mood and Affect: Mood normal.         Behavior: Behavior normal.         Thought Content: Thought content normal.         Judgment: Judgment normal.         Assessment:       1. Hypokalemia    2. Benign prostatic hyperplasia with incomplete bladder emptying    3. Diabetes mellitus without complication    4. HTN, goal below 130/80     5. Hypercholesterolemia        Plan:       A repeat serum potassium level has been drawn for follow-up of the hypokalemia.    There appears to be no sequela from the urinary tract infection on hold the antibiotic therapy prescribed for same.    Diabetes is fairly well controlled at this time and no medication changes are to be carried out.    Because of the symptom of obstructive uropathy the Flomax will be started at 0.4 mg every night starting tonight    Blood pressure is well controlled 128/76.    Patient is already established with home health is to continue upon further physical therapy trying to getting back to his baseline level of activity and well-being.

## 2022-05-31 NOTE — PROGRESS NOTES
Subjective:       Patient ID: John Martini is a 72 y.o. male.    Chief Complaint: Follow-up (Patient went to [place] on 5/13/2022. Patient was recently hospitalized for Hypokalemia at [facility] appr 5-6 days. Patient is still feeling weak and complains of back pain. ), PES - Care Gap (Lipid Panel is due in Sept 2022. Diabetic Urine Screening due in Aug 2022. Patient would like to discuss screening for colonoscopy. ), and Transitional Care (Patient went to [place] on 5/13/2022. Patient was recently hospitalized for Hypokalemia at [facility] appr 5-6 days. Patient is still feeling weak and complains of back pain. )    HPI  Review of Systems      Objective:      Physical Exam    Assessment:       Problem List Items Addressed This Visit    None     Visit Diagnoses     Hypokalemia    -  Primary    Benign prostatic hyperplasia with incomplete bladder emptying        Diabetes mellitus without complication        HTN, goal below 130/80        Hypercholesterolemia              Plan:       Test

## 2022-06-09 ENCOUNTER — EXTERNAL HOME HEALTH (OUTPATIENT)
Dept: HOME HEALTH SERVICES | Facility: HOSPITAL | Age: 72
End: 2022-06-09
Payer: MEDICARE

## 2022-06-13 LAB — CRC RECOMMENDATION EXT: NORMAL

## 2022-06-22 ENCOUNTER — DOCUMENT SCAN (OUTPATIENT)
Dept: HOME HEALTH SERVICES | Facility: HOSPITAL | Age: 72
End: 2022-06-22
Payer: MEDICARE

## 2022-06-23 ENCOUNTER — PATIENT OUTREACH (OUTPATIENT)
Dept: ADMINISTRATIVE | Facility: HOSPITAL | Age: 72
End: 2022-06-23
Payer: MEDICARE

## 2022-06-28 ENCOUNTER — DOCUMENT SCAN (OUTPATIENT)
Dept: HOME HEALTH SERVICES | Facility: HOSPITAL | Age: 72
End: 2022-06-28
Payer: MEDICARE

## 2022-07-12 ENCOUNTER — OFFICE VISIT (OUTPATIENT)
Dept: URGENT CARE | Facility: CLINIC | Age: 72
End: 2022-07-12
Payer: MEDICARE

## 2022-07-12 VITALS
RESPIRATION RATE: 18 BRPM | HEIGHT: 74 IN | HEART RATE: 79 BPM | DIASTOLIC BLOOD PRESSURE: 76 MMHG | OXYGEN SATURATION: 95 % | BODY MASS INDEX: 31.06 KG/M2 | SYSTOLIC BLOOD PRESSURE: 138 MMHG | WEIGHT: 242 LBS | TEMPERATURE: 99 F

## 2022-07-12 DIAGNOSIS — R50.9 FEVER, UNSPECIFIED FEVER CAUSE: ICD-10-CM

## 2022-07-12 DIAGNOSIS — Z20.822 CLOSE EXPOSURE TO COVID-19 VIRUS: Primary | ICD-10-CM

## 2022-07-12 DIAGNOSIS — U07.1 COVID-19: ICD-10-CM

## 2022-07-12 LAB
CTP QC/QA: YES
SARS-COV-2 AG RESP QL IA.RAPID: POSITIVE

## 2022-07-12 PROCEDURE — 3075F PR MOST RECENT SYSTOLIC BLOOD PRESS GE 130-139MM HG: ICD-10-PCS | Mod: CPTII,S$GLB,, | Performed by: STUDENT IN AN ORGANIZED HEALTH CARE EDUCATION/TRAINING PROGRAM

## 2022-07-12 PROCEDURE — 3288F PR FALLS RISK ASSESSMENT DOCUMENTED: ICD-10-PCS | Mod: CPTII,S$GLB,, | Performed by: STUDENT IN AN ORGANIZED HEALTH CARE EDUCATION/TRAINING PROGRAM

## 2022-07-12 PROCEDURE — 4010F PR ACE/ARB THEARPY RXD/TAKEN: ICD-10-PCS | Mod: CPTII,S$GLB,, | Performed by: STUDENT IN AN ORGANIZED HEALTH CARE EDUCATION/TRAINING PROGRAM

## 2022-07-12 PROCEDURE — 3075F SYST BP GE 130 - 139MM HG: CPT | Mod: CPTII,S$GLB,, | Performed by: STUDENT IN AN ORGANIZED HEALTH CARE EDUCATION/TRAINING PROGRAM

## 2022-07-12 PROCEDURE — 1125F PR PAIN SEVERITY QUANTIFIED, PAIN PRESENT: ICD-10-PCS | Mod: CPTII,S$GLB,, | Performed by: STUDENT IN AN ORGANIZED HEALTH CARE EDUCATION/TRAINING PROGRAM

## 2022-07-12 PROCEDURE — 1160F PR REVIEW ALL MEDS BY PRESCRIBER/CLIN PHARMACIST DOCUMENTED: ICD-10-PCS | Mod: CPTII,S$GLB,, | Performed by: STUDENT IN AN ORGANIZED HEALTH CARE EDUCATION/TRAINING PROGRAM

## 2022-07-12 PROCEDURE — 3288F FALL RISK ASSESSMENT DOCD: CPT | Mod: CPTII,S$GLB,, | Performed by: STUDENT IN AN ORGANIZED HEALTH CARE EDUCATION/TRAINING PROGRAM

## 2022-07-12 PROCEDURE — 1125F AMNT PAIN NOTED PAIN PRSNT: CPT | Mod: CPTII,S$GLB,, | Performed by: STUDENT IN AN ORGANIZED HEALTH CARE EDUCATION/TRAINING PROGRAM

## 2022-07-12 PROCEDURE — 99204 PR OFFICE/OUTPT VISIT, NEW, LEVL IV, 45-59 MIN: ICD-10-PCS | Mod: S$GLB,,, | Performed by: STUDENT IN AN ORGANIZED HEALTH CARE EDUCATION/TRAINING PROGRAM

## 2022-07-12 PROCEDURE — 3078F DIAST BP <80 MM HG: CPT | Mod: CPTII,S$GLB,, | Performed by: STUDENT IN AN ORGANIZED HEALTH CARE EDUCATION/TRAINING PROGRAM

## 2022-07-12 PROCEDURE — 3052F PR MOST RECENT HEMOGLOBIN A1C LEVEL 8.0 - < 9.0%: ICD-10-PCS | Mod: CPTII,S$GLB,, | Performed by: STUDENT IN AN ORGANIZED HEALTH CARE EDUCATION/TRAINING PROGRAM

## 2022-07-12 PROCEDURE — 3052F HG A1C>EQUAL 8.0%<EQUAL 9.0%: CPT | Mod: CPTII,S$GLB,, | Performed by: STUDENT IN AN ORGANIZED HEALTH CARE EDUCATION/TRAINING PROGRAM

## 2022-07-12 PROCEDURE — 1159F PR MEDICATION LIST DOCUMENTED IN MEDICAL RECORD: ICD-10-PCS | Mod: CPTII,S$GLB,, | Performed by: STUDENT IN AN ORGANIZED HEALTH CARE EDUCATION/TRAINING PROGRAM

## 2022-07-12 PROCEDURE — 1159F MED LIST DOCD IN RCRD: CPT | Mod: CPTII,S$GLB,, | Performed by: STUDENT IN AN ORGANIZED HEALTH CARE EDUCATION/TRAINING PROGRAM

## 2022-07-12 PROCEDURE — 87811 SARS-COV-2 COVID19 W/OPTIC: CPT | Mod: QW,S$GLB,, | Performed by: STUDENT IN AN ORGANIZED HEALTH CARE EDUCATION/TRAINING PROGRAM

## 2022-07-12 PROCEDURE — 99204 OFFICE O/P NEW MOD 45 MIN: CPT | Mod: S$GLB,,, | Performed by: STUDENT IN AN ORGANIZED HEALTH CARE EDUCATION/TRAINING PROGRAM

## 2022-07-12 PROCEDURE — 1101F PT FALLS ASSESS-DOCD LE1/YR: CPT | Mod: CPTII,S$GLB,, | Performed by: STUDENT IN AN ORGANIZED HEALTH CARE EDUCATION/TRAINING PROGRAM

## 2022-07-12 PROCEDURE — 1160F RVW MEDS BY RX/DR IN RCRD: CPT | Mod: CPTII,S$GLB,, | Performed by: STUDENT IN AN ORGANIZED HEALTH CARE EDUCATION/TRAINING PROGRAM

## 2022-07-12 PROCEDURE — 4010F ACE/ARB THERAPY RXD/TAKEN: CPT | Mod: CPTII,S$GLB,, | Performed by: STUDENT IN AN ORGANIZED HEALTH CARE EDUCATION/TRAINING PROGRAM

## 2022-07-12 PROCEDURE — 3008F BODY MASS INDEX DOCD: CPT | Mod: CPTII,S$GLB,, | Performed by: STUDENT IN AN ORGANIZED HEALTH CARE EDUCATION/TRAINING PROGRAM

## 2022-07-12 PROCEDURE — 3078F PR MOST RECENT DIASTOLIC BLOOD PRESSURE < 80 MM HG: ICD-10-PCS | Mod: CPTII,S$GLB,, | Performed by: STUDENT IN AN ORGANIZED HEALTH CARE EDUCATION/TRAINING PROGRAM

## 2022-07-12 PROCEDURE — 1101F PR PT FALLS ASSESS DOC 0-1 FALLS W/OUT INJ PAST YR: ICD-10-PCS | Mod: CPTII,S$GLB,, | Performed by: STUDENT IN AN ORGANIZED HEALTH CARE EDUCATION/TRAINING PROGRAM

## 2022-07-12 PROCEDURE — 87811 SARS CORONAVIRUS 2 ANTIGEN POCT, MANUAL READ: ICD-10-PCS | Mod: QW,S$GLB,, | Performed by: STUDENT IN AN ORGANIZED HEALTH CARE EDUCATION/TRAINING PROGRAM

## 2022-07-12 PROCEDURE — 3008F PR BODY MASS INDEX (BMI) DOCUMENTED: ICD-10-PCS | Mod: CPTII,S$GLB,, | Performed by: STUDENT IN AN ORGANIZED HEALTH CARE EDUCATION/TRAINING PROGRAM

## 2022-07-12 RX ORDER — BENZONATATE 200 MG/1
200 CAPSULE ORAL 3 TIMES DAILY PRN
Qty: 30 CAPSULE | Refills: 0 | Status: SHIPPED | OUTPATIENT
Start: 2022-07-12 | End: 2022-07-22

## 2022-07-12 RX ORDER — AZITHROMYCIN 250 MG/1
TABLET, FILM COATED ORAL
Qty: 6 TABLET | Refills: 0 | Status: SHIPPED | OUTPATIENT
Start: 2022-07-12 | End: 2022-07-17

## 2022-07-12 RX ORDER — ALBUTEROL SULFATE 90 UG/1
1-2 AEROSOL, METERED RESPIRATORY (INHALATION) EVERY 6 HOURS PRN
Qty: 18 G | Refills: 0 | Status: SHIPPED | OUTPATIENT
Start: 2022-07-12 | End: 2022-08-24

## 2022-07-12 NOTE — PROGRESS NOTES
"Subjective:       Patient ID: John Martini is a 72 y.o. male.    Vitals:  height is 6' 2" (1.88 m) and weight is 109.8 kg (242 lb). His oral temperature is 98.7 °F (37.1 °C). His blood pressure is 138/76 and his pulse is 79. His respiration is 18 and oxygen saturation is 95%.     Chief Complaint: Leg Pain (PT c/o pain and difficulty walking since yesterday ) and Fever (Pt states his temp reached 105 degrees F last night. Used tylenol to bring down.)    Patient is a 72-year-old male with a past medical history of hypertension, type 2 diabetes, GERD, and hyperthyroidism who presents to clinic for COVID testing.  Patient reports he is vaccinated.  Patient reports positive sick exposure as his wife tested positive for COVID yesterday.  Patient reports symptoms began 2 days ago however became worse yesterday.  Patient states has taken over-the-counter medications to include Tylenol for fever and body aches.  Patient states primary location of his body aches is that of his legs, back, and arms.  Patient complaining of fatigue, fever with a temperature max reportedly of 105° F, chills, nasal sinus congestion with postnasal drainage, sore throat, an occasionally productive cough, generalized body aches, and generalized headaches.  Patient denies any acute dizziness, ear pain, chest pain or palpitations, shortness of breath or abnormal breath sounds, abdominal pain, nausea or vomiting, diarrhea, rash, or change in mentation.    Leg Pain   The incident occurred 12 to 24 hours ago. The incident occurred at home. The injury mechanism is unknown. The pain is present in the left leg and right leg. The pain is at a severity of 8/10. The pain is moderate. The pain has been worsening since onset. Associated symptoms include an inability to bear weight and muscle weakness. He reports no foreign bodies present. The symptoms are aggravated by movement and weight bearing. He has tried acetaminophen for the symptoms. The " treatment provided no relief.   Fever   This is a new problem. The current episode started yesterday. The problem occurs intermittently. The problem has been gradually improving. The maximum temperature noted was more than 104 F. Associated symptoms include congestion, coughing, headaches, muscle aches and a sore throat. Pertinent negatives include no abdominal pain, chest pain, diarrhea, ear pain, nausea, rash, vomiting or wheezing. He has tried acetaminophen for the symptoms. The treatment provided moderate relief.   Risk factors: sick contacts        Constitution: Positive for chills, fatigue and fever (Reports temperature max 105° F).   HENT: Positive for congestion, postnasal drip and sore throat. Negative for ear pain.    Neck: neck negative. Negative for painful lymph nodes.   Cardiovascular: Negative.  Negative for chest pain and palpitations.   Eyes: Negative.    Respiratory: Positive for cough and sputum production. Negative for chest tightness, shortness of breath and wheezing.    Gastrointestinal: Negative.  Negative for abdominal pain, nausea, vomiting and diarrhea.   Endocrine: negative.   Genitourinary: Negative.    Musculoskeletal: Positive for muscle ache (Legs, arms, back).   Skin: Negative.  Negative for color change, pale, rash and erythema.   Allergic/Immunologic: Negative.    Neurological: Positive for headaches. Negative for dizziness, light-headedness, passing out, disorientation and altered mental status.   Hematologic/Lymphatic: Negative.  Negative for swollen lymph nodes.   Psychiatric/Behavioral: Negative.  Negative for altered mental status, disorientation and confusion.       Objective:      Physical Exam   Constitutional: He is oriented to person, place, and time. He appears well-developed. He is cooperative.  Non-toxic appearance. He does not appear ill. No distress.   HENT:   Head: Normocephalic and atraumatic.   Ears:   Right Ear: Hearing, tympanic membrane, external ear and ear  canal normal.   Left Ear: Hearing, tympanic membrane, external ear and ear canal normal.   Nose: Congestion present. No mucosal edema, rhinorrhea or nasal deformity. No epistaxis. Right sinus exhibits no maxillary sinus tenderness and no frontal sinus tenderness. Left sinus exhibits no maxillary sinus tenderness and no frontal sinus tenderness.   Mouth/Throat: Uvula is midline and mucous membranes are normal. Mucous membranes are moist. No trismus in the jaw. Normal dentition. No uvula swelling. Posterior oropharyngeal erythema present. No oropharyngeal exudate. Oropharynx is clear.   Eyes: Conjunctivae and lids are normal. Pupils are equal, round, and reactive to light. Right eye exhibits no discharge. Left eye exhibits no discharge. No scleral icterus.   Neck: Trachea normal and phonation normal. Neck supple. No neck rigidity present.   Cardiovascular: Normal rate, regular rhythm, normal heart sounds and normal pulses.   Pulmonary/Chest: Effort normal and breath sounds normal. No respiratory distress (Unlabored.  Equal rise and fall of chest.  Able speak in full complete sentences.). He has no wheezes. He has no rhonchi. He has no rales.   Abdominal: Normal appearance and bowel sounds are normal. He exhibits no distension. Soft. There is no abdominal tenderness.   Musculoskeletal: Normal range of motion.         General: No deformity. Normal range of motion.      Cervical back: He exhibits no tenderness.   Lymphadenopathy:     He has no cervical adenopathy.   Neurological: He is alert and oriented to person, place, and time. He exhibits normal muscle tone. Coordination normal.   Skin: Skin is warm, dry, intact, not diaphoretic, not pale and no rash. Capillary refill takes 2 to 3 seconds. No erythema   Psychiatric: His speech is normal and behavior is normal. Judgment and thought content normal.   Nursing note and vitals reviewed.        Assessment:       1. Close exposure to COVID-19 virus    2. Fever, unspecified  fever cause    3. COVID-19          Plan:         Close exposure to COVID-19 virus  -     SARS Coronavirus 2 Antigen, POCT Manual Read    Fever, unspecified fever cause  -     SARS Coronavirus 2 Antigen, POCT Manual Read    COVID-19    Other orders  -     azithromycin (Z-SATHYA) 250 MG tablet; Take 2 tablets by mouth on day 1; Take 1 tablet by mouth on days 2-5  Dispense: 6 tablet; Refill: 0  -     benzonatate (TESSALON) 200 MG capsule; Take 1 capsule (200 mg total) by mouth 3 (three) times daily as needed for Cough.  Dispense: 30 capsule; Refill: 0  -     albuterol (VENTOLIN HFA) 90 mcg/actuation inhaler; Inhale 1-2 puffs into the lungs every 6 (six) hours as needed for Wheezing or Shortness of Breath. Rescue  Dispense: 18 g; Refill: 0                 Labs:  COVID positive.  Quarantine as directed.  Quarantine information provided to patient.  COVID recommendations provided.  (Vitamin-C, vitamin D3, Pepcid, Zyrtec, zinc)  Tylenol per package instructions for any pain or fever.  May rotate with Motrin if unable to control pain or fever along with Tylenol.  Monitor home SpO2 and present to emergency department with readings less than 92%.  Take medications as prescribed.  Assure adequate hydration.  Follow-up with PCP in 1-2 days.  Return to clinic as needed.  To ED for any new or acutely worsening symptoms including but not limited to chest pain, palpitations, shortness of breath, or fever greater than 103° F.  Patient in agreement with plan of care.    DISCLAIMER: Please note that my documentation in this Electronic Healthcare Record was produced using speech recognition software and therefore may contain errors related to that software system.These could include grammar, punctuation and spelling errors or the inclusion/exclusion of phrases that were not intended. Garbled syntax, mangled pronouns, and other bizarre constructions may be attributed to that software system.

## 2022-08-23 ENCOUNTER — LAB VISIT (OUTPATIENT)
Dept: FAMILY MEDICINE | Facility: CLINIC | Age: 72
End: 2022-08-23
Payer: MEDICARE

## 2022-08-23 DIAGNOSIS — E11.9 DIABETES MELLITUS WITHOUT COMPLICATION: ICD-10-CM

## 2022-08-23 LAB
ESTIMATED AVG GLUCOSE: 189 MG/DL (ref 68–131)
HBA1C MFR BLD: 8.2 % (ref 4–5.6)

## 2022-08-23 PROCEDURE — 83036 HEMOGLOBIN GLYCOSYLATED A1C: CPT | Performed by: INTERNAL MEDICINE

## 2022-08-24 ENCOUNTER — OFFICE VISIT (OUTPATIENT)
Dept: FAMILY MEDICINE | Facility: CLINIC | Age: 72
End: 2022-08-24
Payer: MEDICARE

## 2022-08-24 VITALS
SYSTOLIC BLOOD PRESSURE: 126 MMHG | TEMPERATURE: 98 F | HEART RATE: 68 BPM | OXYGEN SATURATION: 97 % | HEIGHT: 74 IN | DIASTOLIC BLOOD PRESSURE: 82 MMHG | WEIGHT: 229 LBS | BODY MASS INDEX: 29.39 KG/M2

## 2022-08-24 DIAGNOSIS — Z98.84 H/O BARIATRIC SURGERY: ICD-10-CM

## 2022-08-24 DIAGNOSIS — E11.9 DIABETES MELLITUS WITHOUT COMPLICATION: Primary | ICD-10-CM

## 2022-08-24 DIAGNOSIS — I10 HTN, GOAL BELOW 130/80: ICD-10-CM

## 2022-08-24 DIAGNOSIS — E78.00 HYPERCHOLESTEROLEMIA: ICD-10-CM

## 2022-08-24 LAB
ALBUMIN/CREAT UR: 107.7 UG/MG (ref 0–30)
CREAT UR-MCNC: 91 MG/DL (ref 23–375)
MICROALBUMIN UR DL<=1MG/L-MCNC: 98 UG/ML

## 2022-08-24 PROCEDURE — 99213 PR OFFICE/OUTPT VISIT, EST, LEVL III, 20-29 MIN: ICD-10-PCS | Mod: S$GLB,,, | Performed by: INTERNAL MEDICINE

## 2022-08-24 PROCEDURE — 4010F ACE/ARB THERAPY RXD/TAKEN: CPT | Mod: CPTII,S$GLB,, | Performed by: INTERNAL MEDICINE

## 2022-08-24 PROCEDURE — 1101F PT FALLS ASSESS-DOCD LE1/YR: CPT | Mod: CPTII,S$GLB,, | Performed by: INTERNAL MEDICINE

## 2022-08-24 PROCEDURE — 99213 OFFICE O/P EST LOW 20 MIN: CPT | Mod: S$GLB,,, | Performed by: INTERNAL MEDICINE

## 2022-08-24 PROCEDURE — 1125F PR PAIN SEVERITY QUANTIFIED, PAIN PRESENT: ICD-10-PCS | Mod: CPTII,S$GLB,, | Performed by: INTERNAL MEDICINE

## 2022-08-24 PROCEDURE — 3079F PR MOST RECENT DIASTOLIC BLOOD PRESSURE 80-89 MM HG: ICD-10-PCS | Mod: CPTII,S$GLB,, | Performed by: INTERNAL MEDICINE

## 2022-08-24 PROCEDURE — 3008F PR BODY MASS INDEX (BMI) DOCUMENTED: ICD-10-PCS | Mod: CPTII,S$GLB,, | Performed by: INTERNAL MEDICINE

## 2022-08-24 PROCEDURE — 1101F PR PT FALLS ASSESS DOC 0-1 FALLS W/OUT INJ PAST YR: ICD-10-PCS | Mod: CPTII,S$GLB,, | Performed by: INTERNAL MEDICINE

## 2022-08-24 PROCEDURE — 3074F PR MOST RECENT SYSTOLIC BLOOD PRESSURE < 130 MM HG: ICD-10-PCS | Mod: CPTII,S$GLB,, | Performed by: INTERNAL MEDICINE

## 2022-08-24 PROCEDURE — 82043 UR ALBUMIN QUANTITATIVE: CPT | Performed by: INTERNAL MEDICINE

## 2022-08-24 PROCEDURE — 3052F HG A1C>EQUAL 8.0%<EQUAL 9.0%: CPT | Mod: CPTII,S$GLB,, | Performed by: INTERNAL MEDICINE

## 2022-08-24 PROCEDURE — 3074F SYST BP LT 130 MM HG: CPT | Mod: CPTII,S$GLB,, | Performed by: INTERNAL MEDICINE

## 2022-08-24 PROCEDURE — 1159F MED LIST DOCD IN RCRD: CPT | Mod: CPTII,S$GLB,, | Performed by: INTERNAL MEDICINE

## 2022-08-24 PROCEDURE — 82570 ASSAY OF URINE CREATININE: CPT | Performed by: INTERNAL MEDICINE

## 2022-08-24 PROCEDURE — 3288F FALL RISK ASSESSMENT DOCD: CPT | Mod: CPTII,S$GLB,, | Performed by: INTERNAL MEDICINE

## 2022-08-24 PROCEDURE — 1125F AMNT PAIN NOTED PAIN PRSNT: CPT | Mod: CPTII,S$GLB,, | Performed by: INTERNAL MEDICINE

## 2022-08-24 PROCEDURE — 3008F BODY MASS INDEX DOCD: CPT | Mod: CPTII,S$GLB,, | Performed by: INTERNAL MEDICINE

## 2022-08-24 PROCEDURE — 3288F PR FALLS RISK ASSESSMENT DOCUMENTED: ICD-10-PCS | Mod: CPTII,S$GLB,, | Performed by: INTERNAL MEDICINE

## 2022-08-24 PROCEDURE — 1159F PR MEDICATION LIST DOCUMENTED IN MEDICAL RECORD: ICD-10-PCS | Mod: CPTII,S$GLB,, | Performed by: INTERNAL MEDICINE

## 2022-08-24 PROCEDURE — 3052F PR MOST RECENT HEMOGLOBIN A1C LEVEL 8.0 - < 9.0%: ICD-10-PCS | Mod: CPTII,S$GLB,, | Performed by: INTERNAL MEDICINE

## 2022-08-24 PROCEDURE — 4010F PR ACE/ARB THEARPY RXD/TAKEN: ICD-10-PCS | Mod: CPTII,S$GLB,, | Performed by: INTERNAL MEDICINE

## 2022-08-24 PROCEDURE — 3079F DIAST BP 80-89 MM HG: CPT | Mod: CPTII,S$GLB,, | Performed by: INTERNAL MEDICINE

## 2022-08-24 RX ORDER — INSULIN DEGLUDEC 200 U/ML
INJECTION, SOLUTION SUBCUTANEOUS
Qty: 6 PEN | Refills: 6 | Status: SHIPPED | OUTPATIENT
Start: 2022-08-24

## 2022-08-24 NOTE — PROGRESS NOTES
Subjective:       Patient ID: John Martini is a 72 y.o. male.    Chief Complaint: Follow-up (Patient is here in office to review and discuss Hemoglobin A1C results from bloodwork drawn on 8/23/2022. )    Patient presents for follow-up of glycemic control testing carried out at this facility on August 23rd of 2022.  Hemoglobin A1c returned at 8.2% giving him an average glucose over the last 60 days of 189.  Patient has already increased it on his own to 32 units at bedtime but still not achieving controlled with his Tresiba U 200.  Plans are for him to be increasing it to 40 units subcutaneously at bedtime and prescription reflecting this change has been issued.  That will be for 6 pens of the used 200 Tresiba to cover a 3 month supply with refills good for a year.  This has been issued to [facility].      Vital signs are stable with blood pressure 126/82 pulse oximetry 97% room air pulse of 68 per minute normal sinus rhythm and normal oral temperature.      Urine for microalbumin screen has been obtained today to satisfy that care gap.      He has had 2 doses of the COVID vaccine 1 booster as of now.  Patient is interested in receiving the seasonal influenza vaccination when available.      There are no other care gaps pending.      Plans are for him to have a follow-up hemoglobin A1c on November 29, 2022 with follow-up appointment.      Patient has enough testing strips at home and have explained to him that our goal is for the fasting glucose to be equal to less than 120 and 12 for prandial to be equal to or less than 170.    Medication list was reviewed and there is no need for refills.            Review of Systems   All other systems reviewed and are negative.        Objective:      Physical Exam  Vitals and nursing note reviewed.   Constitutional:       General: He is not in acute distress.     Appearance: Normal appearance. He is normal weight. He is not ill-appearing, toxic-appearing or  diaphoretic.   HENT:      Head: Normocephalic and atraumatic.   Skin:     General: Skin is warm and dry.      Capillary Refill: Capillary refill takes 2 to 3 seconds.      Coloration: Skin is not jaundiced or pale.   Neurological:      Mental Status: He is alert. Mental status is at baseline.      Cranial Nerves: No cranial nerve deficit.      Sensory: No sensory deficit.      Motor: No weakness.      Coordination: Coordination normal.      Gait: Gait normal.   Psychiatric:         Mood and Affect: Mood normal.         Behavior: Behavior normal.         Thought Content: Thought content normal.         Judgment: Judgment normal.         Assessment:       Problem List Items Addressed This Visit    None     Visit Diagnoses     Diabetes mellitus without complication    -  Primary    Hypercholesterolemia        H/O bariatric surgery        HTN, goal below 130/80              Plan:       Patient is to try and diet stricter and also increase his Tresiba at 40 units subcutaneously of the U 200 formulation.  Prescription for same has already been issued.  Goals of therapy have been explained to the patient so that his capillary blood glucose fasting should be equal to less than 120 every morning and 2 hours postprandial should be equal to less than 170.  He has enough testing supplies at home.  If there is any questions is welcome to call the office.    Urine for microalbumin has been obtained today.      Vacation list has been reviewed and there is no need for refills today.      He is interested in receiving the seasonal influenza vaccination when available.      Blood pressure is to goal at 126/82 with current therapy.      No other care gaps pending.      He has received 2 doses of the COVID vaccine and no booster as of now.      On review of the hemoglobin A1cs he has not had 1 equal to less than 7.6 since March of 2021.    He is up-to-date with his eye examination.      Pt is well aware of the signs and symptoms to  watch for and to seek medical attention in case these appear

## 2022-10-05 DIAGNOSIS — E11.9 TYPE 2 DIABETES MELLITUS WITHOUT COMPLICATION: ICD-10-CM

## 2022-10-10 ENCOUNTER — PATIENT MESSAGE (OUTPATIENT)
Dept: ADMINISTRATIVE | Facility: HOSPITAL | Age: 72
End: 2022-10-10
Payer: MEDICARE

## 2022-10-10 DIAGNOSIS — E87.6 HYPOKALEMIA: ICD-10-CM

## 2022-10-10 DIAGNOSIS — I10 ESSENTIAL HYPERTENSION: ICD-10-CM

## 2022-10-10 DIAGNOSIS — E11.9 DIABETES MELLITUS WITHOUT COMPLICATION: ICD-10-CM

## 2022-10-10 DIAGNOSIS — Z79.899 ENCOUNTER FOR LONG-TERM (CURRENT) USE OF OTHER MEDICATIONS: ICD-10-CM

## 2022-10-10 DIAGNOSIS — E78.00 HYPERCHOLESTEROLEMIA: Primary | ICD-10-CM

## 2022-11-29 ENCOUNTER — LAB VISIT (OUTPATIENT)
Dept: FAMILY MEDICINE | Facility: CLINIC | Age: 72
End: 2022-11-29
Payer: MEDICARE

## 2022-11-29 DIAGNOSIS — E87.6 HYPOKALEMIA: ICD-10-CM

## 2022-11-29 DIAGNOSIS — I10 ESSENTIAL HYPERTENSION: ICD-10-CM

## 2022-11-29 DIAGNOSIS — E11.9 DIABETES MELLITUS WITHOUT COMPLICATION: ICD-10-CM

## 2022-11-29 DIAGNOSIS — Z79.899 ENCOUNTER FOR LONG-TERM (CURRENT) USE OF OTHER MEDICATIONS: ICD-10-CM

## 2022-11-29 DIAGNOSIS — E78.00 HYPERCHOLESTEROLEMIA: ICD-10-CM

## 2022-11-29 LAB
ALBUMIN SERPL BCP-MCNC: 3.7 G/DL (ref 3.5–5.2)
ALP SERPL-CCNC: 95 U/L (ref 55–135)
ALT SERPL W/O P-5'-P-CCNC: 33 U/L (ref 10–44)
ANION GAP SERPL CALC-SCNC: 12 MMOL/L (ref 8–16)
AST SERPL-CCNC: 29 U/L (ref 10–40)
BASOPHILS # BLD AUTO: 0.14 K/UL (ref 0–0.2)
BASOPHILS NFR BLD: 1.7 % (ref 0–1.9)
BILIRUB SERPL-MCNC: 0.6 MG/DL (ref 0.1–1)
BUN SERPL-MCNC: 17 MG/DL (ref 8–23)
CALCIUM SERPL-MCNC: 9.1 MG/DL (ref 8.7–10.5)
CHLORIDE SERPL-SCNC: 107 MMOL/L (ref 95–110)
CHOLEST SERPL-MCNC: 110 MG/DL (ref 120–199)
CHOLEST/HDLC SERPL: 2.3 {RATIO} (ref 2–5)
CO2 SERPL-SCNC: 23 MMOL/L (ref 23–29)
CREAT SERPL-MCNC: 1 MG/DL (ref 0.5–1.4)
DIFFERENTIAL METHOD: ABNORMAL
EOSINOPHIL # BLD AUTO: 0.3 K/UL (ref 0–0.5)
EOSINOPHIL NFR BLD: 3.7 % (ref 0–8)
ERYTHROCYTE [DISTWIDTH] IN BLOOD BY AUTOMATED COUNT: 15.9 % (ref 11.5–14.5)
EST. GFR  (NO RACE VARIABLE): >60 ML/MIN/1.73 M^2
GLUCOSE SERPL-MCNC: 78 MG/DL (ref 70–110)
HCT VFR BLD AUTO: 39.5 % (ref 40–54)
HDLC SERPL-MCNC: 48 MG/DL (ref 40–75)
HDLC SERPL: 43.6 % (ref 20–50)
HGB BLD-MCNC: 12.7 G/DL (ref 14–18)
IMM GRANULOCYTES # BLD AUTO: 0.02 K/UL (ref 0–0.04)
IMM GRANULOCYTES NFR BLD AUTO: 0.2 % (ref 0–0.5)
LDLC SERPL CALC-MCNC: 49.4 MG/DL (ref 63–159)
LYMPHOCYTES # BLD AUTO: 2.6 K/UL (ref 1–4.8)
LYMPHOCYTES NFR BLD: 32.1 % (ref 18–48)
MCH RBC QN AUTO: 26.6 PG (ref 27–31)
MCHC RBC AUTO-ENTMCNC: 32.2 G/DL (ref 32–36)
MCV RBC AUTO: 83 FL (ref 82–98)
MONOCYTES # BLD AUTO: 0.7 K/UL (ref 0.3–1)
MONOCYTES NFR BLD: 8.3 % (ref 4–15)
NEUTROPHILS # BLD AUTO: 4.4 K/UL (ref 1.8–7.7)
NEUTROPHILS NFR BLD: 54 % (ref 38–73)
NONHDLC SERPL-MCNC: 62 MG/DL
NRBC BLD-RTO: 0 /100 WBC
PLATELET # BLD AUTO: 256 K/UL (ref 150–450)
PMV BLD AUTO: 11.8 FL (ref 9.2–12.9)
POTASSIUM SERPL-SCNC: 3.8 MMOL/L (ref 3.5–5.1)
PROT SERPL-MCNC: 6.9 G/DL (ref 6–8.4)
RBC # BLD AUTO: 4.77 M/UL (ref 4.6–6.2)
SODIUM SERPL-SCNC: 142 MMOL/L (ref 136–145)
T4 FREE SERPL-MCNC: 0.81 NG/DL (ref 0.71–1.51)
TRIGL SERPL-MCNC: 63 MG/DL (ref 30–150)
TSH SERPL DL<=0.005 MIU/L-ACNC: 4.56 UIU/ML (ref 0.4–4)
WBC # BLD AUTO: 8.07 K/UL (ref 3.9–12.7)

## 2022-11-29 PROCEDURE — 80061 LIPID PANEL: CPT | Performed by: INTERNAL MEDICINE

## 2022-11-29 PROCEDURE — 84439 ASSAY OF FREE THYROXINE: CPT | Performed by: INTERNAL MEDICINE

## 2022-11-29 PROCEDURE — 83036 HEMOGLOBIN GLYCOSYLATED A1C: CPT | Performed by: INTERNAL MEDICINE

## 2022-11-29 PROCEDURE — 85025 COMPLETE CBC W/AUTO DIFF WBC: CPT | Performed by: INTERNAL MEDICINE

## 2022-11-29 PROCEDURE — 84443 ASSAY THYROID STIM HORMONE: CPT | Performed by: INTERNAL MEDICINE

## 2022-11-29 PROCEDURE — 80053 COMPREHEN METABOLIC PANEL: CPT | Performed by: INTERNAL MEDICINE

## 2022-11-30 ENCOUNTER — OFFICE VISIT (OUTPATIENT)
Dept: FAMILY MEDICINE | Facility: CLINIC | Age: 72
End: 2022-11-30
Payer: MEDICARE

## 2022-11-30 VITALS
RESPIRATION RATE: 16 BRPM | WEIGHT: 234.13 LBS | SYSTOLIC BLOOD PRESSURE: 130 MMHG | DIASTOLIC BLOOD PRESSURE: 74 MMHG | OXYGEN SATURATION: 96 % | BODY MASS INDEX: 30.05 KG/M2 | HEART RATE: 55 BPM | HEIGHT: 74 IN

## 2022-11-30 DIAGNOSIS — I10 ESSENTIAL HYPERTENSION: ICD-10-CM

## 2022-11-30 DIAGNOSIS — E11.9 DIABETES MELLITUS WITHOUT COMPLICATION: Primary | ICD-10-CM

## 2022-11-30 DIAGNOSIS — Z98.84 H/O BARIATRIC SURGERY: ICD-10-CM

## 2022-11-30 DIAGNOSIS — E78.00 HYPERCHOLESTEROLEMIA: ICD-10-CM

## 2022-11-30 LAB
ESTIMATED AVG GLUCOSE: 189 MG/DL (ref 68–131)
HBA1C MFR BLD: 8.2 % (ref 4–5.6)

## 2022-11-30 PROCEDURE — 1126F PR PAIN SEVERITY QUANTIFIED, NO PAIN PRESENT: ICD-10-PCS | Mod: CPTII,S$GLB,, | Performed by: INTERNAL MEDICINE

## 2022-11-30 PROCEDURE — 1101F PR PT FALLS ASSESS DOC 0-1 FALLS W/OUT INJ PAST YR: ICD-10-PCS | Mod: CPTII,S$GLB,, | Performed by: INTERNAL MEDICINE

## 2022-11-30 PROCEDURE — 3078F PR MOST RECENT DIASTOLIC BLOOD PRESSURE < 80 MM HG: ICD-10-PCS | Mod: CPTII,S$GLB,, | Performed by: INTERNAL MEDICINE

## 2022-11-30 PROCEDURE — 3008F BODY MASS INDEX DOCD: CPT | Mod: CPTII,S$GLB,, | Performed by: INTERNAL MEDICINE

## 2022-11-30 PROCEDURE — 3075F PR MOST RECENT SYSTOLIC BLOOD PRESS GE 130-139MM HG: ICD-10-PCS | Mod: CPTII,S$GLB,, | Performed by: INTERNAL MEDICINE

## 2022-11-30 PROCEDURE — 3060F POS MICROALBUMINURIA REV: CPT | Mod: CPTII,S$GLB,, | Performed by: INTERNAL MEDICINE

## 2022-11-30 PROCEDURE — 3066F PR DOCUMENTATION OF TREATMENT FOR NEPHROPATHY: ICD-10-PCS | Mod: CPTII,S$GLB,, | Performed by: INTERNAL MEDICINE

## 2022-11-30 PROCEDURE — 3288F PR FALLS RISK ASSESSMENT DOCUMENTED: ICD-10-PCS | Mod: CPTII,S$GLB,, | Performed by: INTERNAL MEDICINE

## 2022-11-30 PROCEDURE — 4010F PR ACE/ARB THEARPY RXD/TAKEN: ICD-10-PCS | Mod: CPTII,S$GLB,, | Performed by: INTERNAL MEDICINE

## 2022-11-30 PROCEDURE — 1126F AMNT PAIN NOTED NONE PRSNT: CPT | Mod: CPTII,S$GLB,, | Performed by: INTERNAL MEDICINE

## 2022-11-30 PROCEDURE — 1159F MED LIST DOCD IN RCRD: CPT | Mod: CPTII,S$GLB,, | Performed by: INTERNAL MEDICINE

## 2022-11-30 PROCEDURE — 3052F HG A1C>EQUAL 8.0%<EQUAL 9.0%: CPT | Mod: CPTII,S$GLB,, | Performed by: INTERNAL MEDICINE

## 2022-11-30 PROCEDURE — 99213 PR OFFICE/OUTPT VISIT, EST, LEVL III, 20-29 MIN: ICD-10-PCS | Mod: S$GLB,,, | Performed by: INTERNAL MEDICINE

## 2022-11-30 PROCEDURE — 3052F PR MOST RECENT HEMOGLOBIN A1C LEVEL 8.0 - < 9.0%: ICD-10-PCS | Mod: CPTII,S$GLB,, | Performed by: INTERNAL MEDICINE

## 2022-11-30 PROCEDURE — 1159F PR MEDICATION LIST DOCUMENTED IN MEDICAL RECORD: ICD-10-PCS | Mod: CPTII,S$GLB,, | Performed by: INTERNAL MEDICINE

## 2022-11-30 PROCEDURE — 3060F PR POS MICROALBUMINURIA RESULT DOCUMENTED/REVIEW: ICD-10-PCS | Mod: CPTII,S$GLB,, | Performed by: INTERNAL MEDICINE

## 2022-11-30 PROCEDURE — 99213 OFFICE O/P EST LOW 20 MIN: CPT | Mod: S$GLB,,, | Performed by: INTERNAL MEDICINE

## 2022-11-30 PROCEDURE — 3078F DIAST BP <80 MM HG: CPT | Mod: CPTII,S$GLB,, | Performed by: INTERNAL MEDICINE

## 2022-11-30 PROCEDURE — 3075F SYST BP GE 130 - 139MM HG: CPT | Mod: CPTII,S$GLB,, | Performed by: INTERNAL MEDICINE

## 2022-11-30 PROCEDURE — 3066F NEPHROPATHY DOC TX: CPT | Mod: CPTII,S$GLB,, | Performed by: INTERNAL MEDICINE

## 2022-11-30 PROCEDURE — 4010F ACE/ARB THERAPY RXD/TAKEN: CPT | Mod: CPTII,S$GLB,, | Performed by: INTERNAL MEDICINE

## 2022-11-30 PROCEDURE — 3008F PR BODY MASS INDEX (BMI) DOCUMENTED: ICD-10-PCS | Mod: CPTII,S$GLB,, | Performed by: INTERNAL MEDICINE

## 2022-11-30 PROCEDURE — 3288F FALL RISK ASSESSMENT DOCD: CPT | Mod: CPTII,S$GLB,, | Performed by: INTERNAL MEDICINE

## 2022-11-30 PROCEDURE — 1101F PT FALLS ASSESS-DOCD LE1/YR: CPT | Mod: CPTII,S$GLB,, | Performed by: INTERNAL MEDICINE

## 2022-11-30 RX ORDER — TAMSULOSIN HYDROCHLORIDE 0.4 MG/1
1 CAPSULE ORAL
COMMUNITY
Start: 2022-10-28 | End: 2022-12-13 | Stop reason: SDUPTHER

## 2022-11-30 NOTE — PROGRESS NOTES
Subjective:       Patient ID: John Martini is a 72 y.o. male.    Chief Complaint: Follow-up (Pt presents to the clinic to f/u on recent labs)    Patient presents today for follow-up of his glycemic control based on laboratory evaluation obtained at this facility November 29, 2022.  Hemoglobin A1c returned at 8.2% giving average glucose over the last 60 days of 189.  On review of the chart patient has had improvement from an 8.9 back in March of 2022 but is still not reaching the goal of equal to or less than 7.5% if possible.  Patient had decrease the Tresiba insulin dose from 40 units to 38 because he was having some low numbers.  Unfortunately this did not impact the hemoglobin A1c values in a positive way as of now.  Best option is for diet and therapeutic lifestyle changes to be more aggressive instead of having to modify the treatment and increase the risk of hypoglycemia or complicated by having to take short-acting insulin analogues before each meal.  Being the patient is on insulin he can test up to 3 times a day as were trying to achieve control.  The most important values at this time are going to be for him to check his Accu-Cheks 2 hours after meals on our goal is to see an average glucose of equal to or less than 160.    He is to continue using the metformin and the Glyxambi as now.  Repeat hemoglobin A1c will be performed this facility February 28, 2023 with a follow-up appointment.      Patient has enough testing supplies at home.      Blood pressure is well controlled 130/74.    Medication list has been reviewed and there is no need for refills at this time.    Patient is up-to-date with seasonal influenza vaccination has received 4 doses of the COVID vaccines including to boosters as of now.    His heart rate is in the mid 50s maybe related to the therapy with the Flomax more most probably not.  He is on no beta-blocker or any other drug that could have a negative chronotropic effect.  At  time of auscultation patient is in normal sinus rhythm mechanism in the low 60s.    For the sake of completeness patient will be having his wellness examination by the nurse practitioner in the next 2 weeks.  Lab work has already been obtained on November 29th including thyroid function testing, lipid panel, comprehensive metabolic panel and CBC.  There are no pressing medical issues his lab work at the time of my review now.  His hemoglobin has an upward trend when compared to the values back on May of 2022 when he was down to 12.2    Review of Systems   All other systems reviewed and are negative.      Objective:      Physical Exam  Vitals and nursing note reviewed.   Constitutional:       General: He is not in acute distress.     Appearance: Normal appearance. He is obese. He is not ill-appearing, toxic-appearing or diaphoretic.   HENT:      Head: Normocephalic and atraumatic.   Cardiovascular:      Rate and Rhythm: Normal rate and regular rhythm.      Pulses: Normal pulses.      Heart sounds: Normal heart sounds.   Pulmonary:      Effort: Pulmonary effort is normal.      Breath sounds: Normal breath sounds.   Skin:     General: Skin is warm and dry.      Coloration: Skin is not jaundiced or pale.   Neurological:      General: No focal deficit present.      Mental Status: He is alert and oriented to person, place, and time. Mental status is at baseline.      Cranial Nerves: No cranial nerve deficit.      Sensory: No sensory deficit.      Motor: No weakness.      Coordination: Coordination normal.      Gait: Gait normal.   Psychiatric:         Mood and Affect: Mood normal.         Behavior: Behavior normal.         Thought Content: Thought content normal.         Judgment: Judgment normal.       Assessment:       Problem List Items Addressed This Visit    None  Visit Diagnoses       Diabetes mellitus without complication    -  Primary    Essential hypertension        Hypercholesterolemia        H/O bariatric  surgery                Plan:       With regards to his glycemic control it could be better with diet and therapeutic lifestyle changes instead of modifying the therapy at patient's request.  He prefers not to go back on an insulin analog.  Goal is to have the hemoglobin A1cs equal to or less than 7.5% at the time of the retest on February 28, 2023 and he will have a follow-up appointment after the lab work.  For now he will be testing 2 hours after meals and the average glucose should be equal to less than 160 for us to be able to reach that goal.  If there is any problem with hypoglycemia he is to contact me and then will modify the therapy.  For now he will continue the metformin, Glyxambi and Tresiba insulin at 40 units every night.  Patient has enough testing supplies at home.      No other care gaps are pending.      Wellness examination will be carried out by the nurse practitioner in the next 2 weeks.      Blood pressure is well controlled 130/74.      Medication list has been reviewed and there is no need for any other refills.

## 2022-12-02 ENCOUNTER — TELEPHONE (OUTPATIENT)
Dept: ADMINISTRATIVE | Facility: CLINIC | Age: 72
End: 2022-12-02
Payer: MEDICARE

## 2022-12-05 ENCOUNTER — OFFICE VISIT (OUTPATIENT)
Dept: FAMILY MEDICINE | Facility: CLINIC | Age: 72
End: 2022-12-05
Payer: MEDICARE

## 2022-12-05 VITALS
OXYGEN SATURATION: 97 % | SYSTOLIC BLOOD PRESSURE: 126 MMHG | HEIGHT: 74 IN | HEART RATE: 56 BPM | RESPIRATION RATE: 18 BRPM | BODY MASS INDEX: 30.14 KG/M2 | DIASTOLIC BLOOD PRESSURE: 72 MMHG | WEIGHT: 234.81 LBS | TEMPERATURE: 99 F

## 2022-12-05 DIAGNOSIS — I10 ESSENTIAL HYPERTENSION: ICD-10-CM

## 2022-12-05 DIAGNOSIS — E11.40 CONTROLLED TYPE 2 DIABETES MELLITUS WITH DIABETIC NEUROPATHY, WITH LONG-TERM CURRENT USE OF INSULIN: ICD-10-CM

## 2022-12-05 DIAGNOSIS — E78.00 PURE HYPERCHOLESTEROLEMIA: ICD-10-CM

## 2022-12-05 DIAGNOSIS — Z74.09 OTHER REDUCED MOBILITY: ICD-10-CM

## 2022-12-05 DIAGNOSIS — Z98.84 HISTORY OF BARIATRIC SURGERY: ICD-10-CM

## 2022-12-05 DIAGNOSIS — E66.9 OBESITY (BMI 30.0-34.9): ICD-10-CM

## 2022-12-05 DIAGNOSIS — R26.9 ABNORMALITY OF GAIT AND MOBILITY: ICD-10-CM

## 2022-12-05 DIAGNOSIS — I70.0 ATHEROSCLEROSIS OF ABDOMINAL AORTA: ICD-10-CM

## 2022-12-05 DIAGNOSIS — Z79.4 CONTROLLED TYPE 2 DIABETES MELLITUS WITH DIABETIC NEUROPATHY, WITH LONG-TERM CURRENT USE OF INSULIN: ICD-10-CM

## 2022-12-05 DIAGNOSIS — Z00.00 ENCOUNTER FOR PREVENTIVE HEALTH EXAMINATION: Primary | ICD-10-CM

## 2022-12-05 PROBLEM — E03.1 CONGENITAL HYPOTHYROIDISM WITHOUT GOITER: Status: ACTIVE | Noted: 2018-11-14

## 2022-12-05 PROBLEM — G47.33 OBSTRUCTIVE SLEEP APNEA SYNDROME: Status: ACTIVE | Noted: 2018-11-14

## 2022-12-05 PROBLEM — Z87.891 FORMER SMOKER, STOPPED SMOKING IN DISTANT PAST: Status: ACTIVE | Noted: 2018-11-14

## 2022-12-05 PROBLEM — N40.0 BPH WITHOUT OBSTRUCTION/LOWER URINARY TRACT SYMPTOMS: Status: ACTIVE | Noted: 2018-11-14

## 2022-12-05 PROBLEM — Z86.2 HISTORY OF NORMOCYTIC NORMOCHROMIC ANEMIA: Status: ACTIVE | Noted: 2018-11-14

## 2022-12-05 PROBLEM — J30.1 CHRONIC SEASONAL ALLERGIC RHINITIS DUE TO POLLEN: Status: ACTIVE | Noted: 2018-11-14

## 2022-12-05 PROBLEM — E03.9 PRIMARY HYPOTHYROIDISM: Status: ACTIVE | Noted: 2022-12-05

## 2022-12-05 PROCEDURE — 3078F PR MOST RECENT DIASTOLIC BLOOD PRESSURE < 80 MM HG: ICD-10-PCS | Mod: CPTII,S$GLB,, | Performed by: FAMILY MEDICINE

## 2022-12-05 PROCEDURE — 1126F AMNT PAIN NOTED NONE PRSNT: CPT | Mod: CPTII,S$GLB,, | Performed by: FAMILY MEDICINE

## 2022-12-05 PROCEDURE — 1126F PR PAIN SEVERITY QUANTIFIED, NO PAIN PRESENT: ICD-10-PCS | Mod: CPTII,S$GLB,, | Performed by: FAMILY MEDICINE

## 2022-12-05 PROCEDURE — 1101F PT FALLS ASSESS-DOCD LE1/YR: CPT | Mod: CPTII,S$GLB,, | Performed by: FAMILY MEDICINE

## 2022-12-05 PROCEDURE — 3074F SYST BP LT 130 MM HG: CPT | Mod: CPTII,S$GLB,, | Performed by: FAMILY MEDICINE

## 2022-12-05 PROCEDURE — G0439 PPPS, SUBSEQ VISIT: HCPCS | Mod: S$GLB,,, | Performed by: FAMILY MEDICINE

## 2022-12-05 PROCEDURE — 4010F PR ACE/ARB THEARPY RXD/TAKEN: ICD-10-PCS | Mod: CPTII,S$GLB,, | Performed by: FAMILY MEDICINE

## 2022-12-05 PROCEDURE — 3066F PR DOCUMENTATION OF TREATMENT FOR NEPHROPATHY: ICD-10-PCS | Mod: CPTII,S$GLB,, | Performed by: FAMILY MEDICINE

## 2022-12-05 PROCEDURE — 3074F PR MOST RECENT SYSTOLIC BLOOD PRESSURE < 130 MM HG: ICD-10-PCS | Mod: CPTII,S$GLB,, | Performed by: FAMILY MEDICINE

## 2022-12-05 PROCEDURE — 1170F FXNL STATUS ASSESSED: CPT | Mod: CPTII,S$GLB,, | Performed by: FAMILY MEDICINE

## 2022-12-05 PROCEDURE — 1160F RVW MEDS BY RX/DR IN RCRD: CPT | Mod: CPTII,S$GLB,, | Performed by: FAMILY MEDICINE

## 2022-12-05 PROCEDURE — 3052F HG A1C>EQUAL 8.0%<EQUAL 9.0%: CPT | Mod: CPTII,S$GLB,, | Performed by: FAMILY MEDICINE

## 2022-12-05 PROCEDURE — 3008F PR BODY MASS INDEX (BMI) DOCUMENTED: ICD-10-PCS | Mod: CPTII,S$GLB,, | Performed by: FAMILY MEDICINE

## 2022-12-05 PROCEDURE — 1170F PR FUNCTIONAL STATUS ASSESSED: ICD-10-PCS | Mod: CPTII,S$GLB,, | Performed by: FAMILY MEDICINE

## 2022-12-05 PROCEDURE — 3066F NEPHROPATHY DOC TX: CPT | Mod: CPTII,S$GLB,, | Performed by: FAMILY MEDICINE

## 2022-12-05 PROCEDURE — 1159F MED LIST DOCD IN RCRD: CPT | Mod: CPTII,S$GLB,, | Performed by: FAMILY MEDICINE

## 2022-12-05 PROCEDURE — 1160F PR REVIEW ALL MEDS BY PRESCRIBER/CLIN PHARMACIST DOCUMENTED: ICD-10-PCS | Mod: CPTII,S$GLB,, | Performed by: FAMILY MEDICINE

## 2022-12-05 PROCEDURE — 3008F BODY MASS INDEX DOCD: CPT | Mod: CPTII,S$GLB,, | Performed by: FAMILY MEDICINE

## 2022-12-05 PROCEDURE — 3060F POS MICROALBUMINURIA REV: CPT | Mod: CPTII,S$GLB,, | Performed by: FAMILY MEDICINE

## 2022-12-05 PROCEDURE — 1159F PR MEDICATION LIST DOCUMENTED IN MEDICAL RECORD: ICD-10-PCS | Mod: CPTII,S$GLB,, | Performed by: FAMILY MEDICINE

## 2022-12-05 PROCEDURE — 3078F DIAST BP <80 MM HG: CPT | Mod: CPTII,S$GLB,, | Performed by: FAMILY MEDICINE

## 2022-12-05 PROCEDURE — 3288F FALL RISK ASSESSMENT DOCD: CPT | Mod: CPTII,S$GLB,, | Performed by: FAMILY MEDICINE

## 2022-12-05 PROCEDURE — 3060F PR POS MICROALBUMINURIA RESULT DOCUMENTED/REVIEW: ICD-10-PCS | Mod: CPTII,S$GLB,, | Performed by: FAMILY MEDICINE

## 2022-12-05 PROCEDURE — 3288F PR FALLS RISK ASSESSMENT DOCUMENTED: ICD-10-PCS | Mod: CPTII,S$GLB,, | Performed by: FAMILY MEDICINE

## 2022-12-05 PROCEDURE — 1101F PR PT FALLS ASSESS DOC 0-1 FALLS W/OUT INJ PAST YR: ICD-10-PCS | Mod: CPTII,S$GLB,, | Performed by: FAMILY MEDICINE

## 2022-12-05 PROCEDURE — 3052F PR MOST RECENT HEMOGLOBIN A1C LEVEL 8.0 - < 9.0%: ICD-10-PCS | Mod: CPTII,S$GLB,, | Performed by: FAMILY MEDICINE

## 2022-12-05 PROCEDURE — 4010F ACE/ARB THERAPY RXD/TAKEN: CPT | Mod: CPTII,S$GLB,, | Performed by: FAMILY MEDICINE

## 2022-12-05 PROCEDURE — G0439 PR MEDICARE ANNUAL WELLNESS SUBSEQUENT VISIT: ICD-10-PCS | Mod: S$GLB,,, | Performed by: FAMILY MEDICINE

## 2022-12-05 NOTE — PROGRESS NOTES
"  John Martini presented for a  Medicare AWV and comprehensive Health Risk Assessment today. The following components were reviewed and updated:    Medical history  Family History  Social history  Allergies and Current Medications  Health Risk Assessment  Health Maintenance  Care Team         ** See Completed Assessments for Annual Wellness Visit within the encounter summary.**         The following assessments were completed:  Living Situation  CAGE  Depression Screening  Timed Get Up and Go  Whisper Test  Cognitive Function Screening  Nutrition Screening  ADL Screening  PAQ Screening          Vitals:    12/05/22 0939   BP: 126/72   BP Location: Right arm   Patient Position: Sitting   BP Method: Large (Manual)   Pulse: (!) 56   Resp: 18   Temp: 98.9 °F (37.2 °C)   SpO2: 97%   Weight: 106.5 kg (234 lb 12.6 oz)   Height: 6' 2" (1.88 m)     Body mass index is 30.15 kg/m².  Physical Exam  Vitals reviewed.   Constitutional:       Appearance: Normal appearance.   HENT:      Head: Normocephalic and atraumatic.   Eyes:      Pupils: Pupils are equal, round, and reactive to light.   Pulmonary:      Effort: Pulmonary effort is normal. No respiratory distress.   Skin:     General: Skin is warm and dry.   Neurological:      General: No focal deficit present.      Mental Status: He is alert and oriented to person, place, and time.   Psychiatric:         Mood and Affect: Mood normal.         Behavior: Behavior normal.     The ASCVD Risk score (Lesia DK, et al., 2019) failed to calculate for the following reasons:    The valid total cholesterol range is 130 to 320 mg/dL        Diagnoses and health risks identified today and associated recommendations/orders:    1. Encounter for preventive health examination    2. Obesity (BMI 30.0-34.9)  Body mass index is 30.15 kg/m².  Continue healthy diet and regular exercise as tolerated.  Continue medications as prescribed.  Follow up with PCP, Eric Freeman MD     3. History of " bariatric surgery  Stable  Continue medications as prescribed.  Follow up with PCP     4. Essential hypertension  Stable  Continue medications as prescribed.  Follow up with PCP and cardiology, Dr Lamas    5. Pure hypercholesterolemia  Stable  Continue medications as prescribed.  Follow up with PCP and cardio    6. Controlled type 2 diabetes mellitus with diabetic neuropathy, with long-term current use of insulin  Stable  Continue medications as prescribed.  Follow up with PCP     7. Atherosclerosis of abdominal aorta  Stable  Continue medications as prescribed.  Follow up with PCP and cardio    8. Abnormality of gait and mobility  Stable without assistive device    9. Other reduced mobility  Stable without assistive device    Provided John with a 5-10 year written screening schedule and personal prevention plan. Recommendations were developed using the USPSTF age appropriate recommendations. Education, counseling, and referrals were provided as needed. After Visit Summary printed and given to patient which includes a list of additional screenings\tests needed.    Follow up if symptoms worsen or fail to improve, for scheduled appt, 1 year for AWV.    Rowan Aldridge NP        Future Appointments       Date Provider Specialty Appt Notes    2/28/2023  Family Medicine lab    3/7/2023 Eric Freeman MD Family Medicine lab follow up              Review for Opioid Screening: Pt does not have Rx for Opioids/addictive substances  Review for Substance Use Disorders: Pt does not have Rx for Opioids/addictive substances       I offered to discuss advanced care planning, including how to pick a person who would make decisions for you if you were unable to make them for yourself, called a health care power of , and what kind of decisions you might make such as use of life sustaining treatments such as ventilators and tube feeding when faced with a life limiting illness recorded on a living will that  they will need to know. (How you want to be cared for as you near the end of your natural life)     X Patient is interested in learning more about how to make advanced directives.  I provided them paperwork and offered to discuss this with them.

## 2022-12-05 NOTE — PATIENT INSTRUCTIONS
Counseling and Referral of Other Preventative  (Italic type indicates deductible and co-insurance are waived)    Patient Name: John Martini  Today's Date: 12/5/2022    Health Maintenance       Date Due Completion Date    COVID-19 Vaccine (5 - Booster for Pfizer series) 11/16/2022 9/21/2022    Eye Exam 02/04/2023 2/4/2022    Hemoglobin A1c 02/28/2023 11/29/2022    Foot Exam 04/27/2023 4/27/2022    Diabetes Urine Screening 08/24/2023 8/24/2022    Low Dose Statin 10/24/2023 10/24/2022    Lipid Panel 11/29/2023 11/29/2022    TETANUS VACCINE 12/01/2030 12/1/2020        No orders of the defined types were placed in this encounter.      The following information is provided to all patients.  This information is to help you find resources for any of the problems found today that may be affecting your health:                Living healthy guide: www.[facility].[place].[facility]      Understanding Diabetes: www.diabetes.org      Eating healthy: www.cdc.gov/healthyweight      CDC home safety checklist: www.cdc.gov/steadi/patient.html      Agency on Aging: www.goea.[place].[facility]      Alcoholics anonymous (AA): www.aa.org      Physical Activity: www.mainor.nih.gov/ju6jmfk      Tobacco use: www.quitwithusla.org

## 2022-12-13 DIAGNOSIS — R39.14 BENIGN PROSTATIC HYPERPLASIA WITH INCOMPLETE BLADDER EMPTYING: Primary | ICD-10-CM

## 2022-12-13 DIAGNOSIS — N40.1 BENIGN PROSTATIC HYPERPLASIA WITH INCOMPLETE BLADDER EMPTYING: Primary | ICD-10-CM

## 2022-12-13 RX ORDER — TAMSULOSIN HYDROCHLORIDE 0.4 MG/1
1 CAPSULE ORAL DAILY
Qty: 30 CAPSULE | Refills: 0 | Status: SHIPPED | OUTPATIENT
Start: 2022-12-13 | End: 2023-08-14

## 2022-12-13 NOTE — TELEPHONE ENCOUNTER
"Patient requested refill be sent to local [facility] as his mail order prescription is "lost in a warehouse somewhere". GOLD Acosta  "

## 2022-12-28 ENCOUNTER — PATIENT OUTREACH (OUTPATIENT)
Dept: ADMINISTRATIVE | Facility: HOSPITAL | Age: 72
End: 2022-12-28
Payer: MEDICARE

## 2022-12-28 NOTE — PROGRESS NOTES
Non-compliant report chart audits. Chart review completed for HM test overdue (Mammogram, Colon Cancer Screening, Pap smear, DM labs, BP Check and/or Eye Exam)      Care Everywhere and media, updates requested and reviewed.        [facility] and [facility] reviewed.     Pt is already scheduled for dm labs 2/28.

## 2023-01-27 LAB
LEFT EYE DM RETINOPATHY: NEGATIVE
RIGHT EYE DM RETINOPATHY: NEGATIVE

## 2023-02-09 DIAGNOSIS — E11.9 TYPE 2 DIABETES MELLITUS WITHOUT COMPLICATION, UNSPECIFIED WHETHER LONG TERM INSULIN USE: ICD-10-CM

## 2023-02-28 ENCOUNTER — LAB VISIT (OUTPATIENT)
Dept: FAMILY MEDICINE | Facility: CLINIC | Age: 73
End: 2023-02-28
Payer: MEDICARE

## 2023-02-28 DIAGNOSIS — E11.9 DIABETES MELLITUS WITHOUT COMPLICATION: ICD-10-CM

## 2023-02-28 LAB
ESTIMATED AVG GLUCOSE: 177 MG/DL (ref 68–131)
HBA1C MFR BLD: 7.8 % (ref 4–5.6)

## 2023-02-28 PROCEDURE — 83036 HEMOGLOBIN GLYCOSYLATED A1C: CPT | Performed by: INTERNAL MEDICINE

## 2023-03-07 ENCOUNTER — OFFICE VISIT (OUTPATIENT)
Dept: FAMILY MEDICINE | Facility: CLINIC | Age: 73
End: 2023-03-07
Payer: MEDICARE

## 2023-03-07 VITALS
OXYGEN SATURATION: 99 % | SYSTOLIC BLOOD PRESSURE: 130 MMHG | WEIGHT: 241 LBS | HEART RATE: 54 BPM | BODY MASS INDEX: 30.93 KG/M2 | HEIGHT: 74 IN | DIASTOLIC BLOOD PRESSURE: 80 MMHG | RESPIRATION RATE: 16 BRPM

## 2023-03-07 DIAGNOSIS — E11.9 DIABETES MELLITUS WITHOUT COMPLICATION: Primary | ICD-10-CM

## 2023-03-07 DIAGNOSIS — I10 ESSENTIAL HYPERTENSION: ICD-10-CM

## 2023-03-07 DIAGNOSIS — Z98.84 HISTORY OF BARIATRIC SURGERY: ICD-10-CM

## 2023-03-07 DIAGNOSIS — E66.9 OBESITY (BMI 30.0-34.9): ICD-10-CM

## 2023-03-07 PROCEDURE — 3075F SYST BP GE 130 - 139MM HG: CPT | Mod: CPTII,S$GLB,, | Performed by: INTERNAL MEDICINE

## 2023-03-07 PROCEDURE — 3075F PR MOST RECENT SYSTOLIC BLOOD PRESS GE 130-139MM HG: ICD-10-PCS | Mod: CPTII,S$GLB,, | Performed by: INTERNAL MEDICINE

## 2023-03-07 PROCEDURE — 3288F PR FALLS RISK ASSESSMENT DOCUMENTED: ICD-10-PCS | Mod: CPTII,S$GLB,, | Performed by: INTERNAL MEDICINE

## 2023-03-07 PROCEDURE — 99213 OFFICE O/P EST LOW 20 MIN: CPT | Mod: S$GLB,,, | Performed by: INTERNAL MEDICINE

## 2023-03-07 PROCEDURE — 3079F PR MOST RECENT DIASTOLIC BLOOD PRESSURE 80-89 MM HG: ICD-10-PCS | Mod: CPTII,S$GLB,, | Performed by: INTERNAL MEDICINE

## 2023-03-07 PROCEDURE — 1101F PT FALLS ASSESS-DOCD LE1/YR: CPT | Mod: CPTII,S$GLB,, | Performed by: INTERNAL MEDICINE

## 2023-03-07 PROCEDURE — 3051F PR MOST RECENT HEMOGLOBIN A1C LEVEL 7.0 - < 8.0%: ICD-10-PCS | Mod: CPTII,S$GLB,, | Performed by: INTERNAL MEDICINE

## 2023-03-07 PROCEDURE — 1159F MED LIST DOCD IN RCRD: CPT | Mod: CPTII,S$GLB,, | Performed by: INTERNAL MEDICINE

## 2023-03-07 PROCEDURE — 99213 PR OFFICE/OUTPT VISIT, EST, LEVL III, 20-29 MIN: ICD-10-PCS | Mod: S$GLB,,, | Performed by: INTERNAL MEDICINE

## 2023-03-07 PROCEDURE — 1101F PR PT FALLS ASSESS DOC 0-1 FALLS W/OUT INJ PAST YR: ICD-10-PCS | Mod: CPTII,S$GLB,, | Performed by: INTERNAL MEDICINE

## 2023-03-07 PROCEDURE — 3079F DIAST BP 80-89 MM HG: CPT | Mod: CPTII,S$GLB,, | Performed by: INTERNAL MEDICINE

## 2023-03-07 PROCEDURE — 1159F PR MEDICATION LIST DOCUMENTED IN MEDICAL RECORD: ICD-10-PCS | Mod: CPTII,S$GLB,, | Performed by: INTERNAL MEDICINE

## 2023-03-07 PROCEDURE — 3008F PR BODY MASS INDEX (BMI) DOCUMENTED: ICD-10-PCS | Mod: CPTII,S$GLB,, | Performed by: INTERNAL MEDICINE

## 2023-03-07 PROCEDURE — 3008F BODY MASS INDEX DOCD: CPT | Mod: CPTII,S$GLB,, | Performed by: INTERNAL MEDICINE

## 2023-03-07 PROCEDURE — 3051F HG A1C>EQUAL 7.0%<8.0%: CPT | Mod: CPTII,S$GLB,, | Performed by: INTERNAL MEDICINE

## 2023-03-07 PROCEDURE — 1126F AMNT PAIN NOTED NONE PRSNT: CPT | Mod: CPTII,S$GLB,, | Performed by: INTERNAL MEDICINE

## 2023-03-07 PROCEDURE — 4010F PR ACE/ARB THEARPY RXD/TAKEN: ICD-10-PCS | Mod: CPTII,S$GLB,, | Performed by: INTERNAL MEDICINE

## 2023-03-07 PROCEDURE — 1126F PR PAIN SEVERITY QUANTIFIED, NO PAIN PRESENT: ICD-10-PCS | Mod: CPTII,S$GLB,, | Performed by: INTERNAL MEDICINE

## 2023-03-07 PROCEDURE — 4010F ACE/ARB THERAPY RXD/TAKEN: CPT | Mod: CPTII,S$GLB,, | Performed by: INTERNAL MEDICINE

## 2023-03-07 PROCEDURE — 3288F FALL RISK ASSESSMENT DOCD: CPT | Mod: CPTII,S$GLB,, | Performed by: INTERNAL MEDICINE

## 2023-03-07 NOTE — PROGRESS NOTES
Subjective:       Patient ID: John Martini is a 72 y.o. male.    Chief Complaint: Follow-up (PT presents to the clinic for  a f/u on lab results. PT is due for a foot exam )    Protective Sensation (w/ 10 gram monofilament):  Right: Absent  Left: Absent    Visual Inspection:  Onychomycosis -  Bilateral    Pedal Pulses:   Right: Diminished  Left: Diminished    Posterior Tibialis Pulses:   Right:Diminished  Left: Diminished    Patient presents for follow-up of his glycemic control based on laboratory evaluation obtained at this facility on February 28, 2023.  Hemoglobin A1c returned at 7.8% which is an improvement from the prior 8.2% that he is had the 2 prior measurements.  Plans are for him to continue diet and therapeutic lifestyle changes and insulin therapy is not to be resumed at this time.  Being that is considered to be high risk then lab work is to be repeated at this facility June 27, 2023 with a follow-up appointment.  This will be for the hemoglobin A1c.      Foot examination was carried out today as above.      His last eye examination was carried out last month.  I will get the clinical care coordinator to update that information on the care gaps.  There is no other care gaps pending.      Vital signs are stable with a blood pressure 130/80.      Next wellness examination will be due 1st week in December of 2023 with a follow-up appointment by the nurse practitioner as per protocol.    Medication list has been reviewed and there is no need for any refills            Review of Systems   All other systems reviewed and are negative.      Objective:      Physical Exam  Vitals and nursing note reviewed.   Constitutional:       General: He is not in acute distress.     Appearance: Normal appearance. He is obese. He is not ill-appearing, toxic-appearing or diaphoretic.   HENT:      Head: Normocephalic and atraumatic.   Cardiovascular:      Rate and Rhythm: Normal rate and regular rhythm.      Pulses:  Normal pulses.      Heart sounds: Murmur heard.     No gallop.   Pulmonary:      Effort: Pulmonary effort is normal. No respiratory distress.      Breath sounds: Normal breath sounds. No stridor.   Skin:     General: Skin is warm and dry.      Coloration: Skin is not jaundiced or pale.   Neurological:      General: No focal deficit present.      Mental Status: He is alert and oriented to person, place, and time. Mental status is at baseline.      Cranial Nerves: No cranial nerve deficit.      Sensory: No sensory deficit.      Motor: No weakness.      Coordination: Coordination normal.      Gait: Gait normal.   Psychiatric:         Mood and Affect: Mood normal.         Behavior: Behavior normal.         Thought Content: Thought content normal.         Judgment: Judgment normal.       Assessment:       Problem List Items Addressed This Visit          Cardiac/Vascular    Essential hypertension       Endocrine    History of bariatric surgery     Other Visit Diagnoses       Diabetes mellitus without complication    -  Primary    Obesity (BMI 30.0-34.9)                  Plan:       Patient's diabetes is improving with regards to control but not to the goal of equal to or less than 7.5% if possible.  Plans are for the lab work to be repeated at this facility on June 27, 2023 with a follow-up appointment.  This will be for hemoglobin A1c.      His next wellness examination will take place lab work wise on the 1st week of December of 2023 with a follow-up appointment with the nurse practitioner to complete same.      Medication list has been reviewed and there is no need for refills at this time.      Patient is not on insulin now so he is to test capillary blood glucoses only 1 time a day alternating fasting with 2 hours after a meal.  If there is any upward trend he is to contact me before the follow-up appointment.      Foot examination was carried out today.      His eye examination was performed last month.      No  other care gaps is pending.      Blood pressure is well controlled 130/80.

## 2023-03-10 ENCOUNTER — PATIENT OUTREACH (OUTPATIENT)
Dept: ADMINISTRATIVE | Facility: HOSPITAL | Age: 73
End: 2023-03-10
Payer: MEDICARE

## 2023-03-10 DIAGNOSIS — E11.9 TYPE 2 DIABETES MELLITUS WITHOUT COMPLICATION, UNSPECIFIED WHETHER LONG TERM INSULIN USE: Primary | ICD-10-CM

## 2023-03-10 NOTE — PROGRESS NOTES
EYE EXAM REQUESTED FROM DR FORREST  WOIRENE ORDER PLACED FOR DM URINE SCREEN AND LINKED TO FUTURE LAB APPT

## 2023-03-10 NOTE — LETTER
AUTHORIZATION FOR RELEASE OF   CONFIDENTIAL INFORMATION    DR FORREST    We are seeing John Martini, date of birth 1950, in the clinic at [facility]. Eric Freeman MD is the patient's PCP. John Martini has an outstanding lab/procedure at the time we reviewed his chart. In order to help keep his health information updated, he has authorized us to request the following medical record(s):        (  )  MAMMOGRAM                                      (  )  COLONOSCOPY      (  )  PAP SMEAR                                          (  )  OUTSIDE LAB RESULTS     (  )  DEXA SCAN                                          ( X )  EYE EXAM            (  )  FOOT EXAM                                          (  )  ENTIRE RECORD     (  )  OUTSIDE IMMUNIZATIONS                 (  )  _______________         Please fax records to [facility], Roberto H Lopez-Santini, MD, [phone]    Thank you in advance,       Violeta SORIANO  Care Coordinator  [place] [facility] [address] [place] [address]  Phone [phone]  Fax [phone]           Patient Name: John Martini  : 1950  Patient Phone #: [phone]

## 2023-04-12 ENCOUNTER — PATIENT MESSAGE (OUTPATIENT)
Dept: ADMINISTRATIVE | Facility: HOSPITAL | Age: 73
End: 2023-04-12
Payer: MEDICARE

## 2023-04-12 ENCOUNTER — DOCUMENTATION ONLY (OUTPATIENT)
Dept: FAMILY MEDICINE | Facility: CLINIC | Age: 73
End: 2023-04-12
Payer: MEDICARE

## 2023-04-12 NOTE — PROGRESS NOTES
Denied request for diabetic supplies from [facility]. Patient must giselle forms to appointment. NO FAXED orders accepted for DME supplies.

## 2023-06-27 ENCOUNTER — LAB VISIT (OUTPATIENT)
Dept: FAMILY MEDICINE | Facility: CLINIC | Age: 73
End: 2023-06-27
Payer: MEDICARE

## 2023-06-27 DIAGNOSIS — E11.9 TYPE 2 DIABETES MELLITUS WITHOUT COMPLICATION, UNSPECIFIED WHETHER LONG TERM INSULIN USE: ICD-10-CM

## 2023-06-27 DIAGNOSIS — E11.9 DIABETES MELLITUS WITHOUT COMPLICATION: ICD-10-CM

## 2023-06-27 LAB
ALBUMIN/CREAT UR: 205.5 UG/MG (ref 0–30)
CREAT UR-MCNC: 55 MG/DL (ref 23–375)
MICROALBUMIN UR DL<=1MG/L-MCNC: 113 UG/ML

## 2023-06-27 PROCEDURE — 82570 ASSAY OF URINE CREATININE: CPT | Performed by: INTERNAL MEDICINE

## 2023-06-27 PROCEDURE — 83036 HEMOGLOBIN GLYCOSYLATED A1C: CPT | Performed by: INTERNAL MEDICINE

## 2023-06-28 LAB
ESTIMATED AVG GLUCOSE: 194 MG/DL (ref 68–131)
HBA1C MFR BLD: 8.4 % (ref 4–5.6)

## 2023-06-29 ENCOUNTER — OFFICE VISIT (OUTPATIENT)
Dept: FAMILY MEDICINE | Facility: CLINIC | Age: 73
End: 2023-06-29
Payer: MEDICARE

## 2023-06-29 VITALS
HEART RATE: 64 BPM | BODY MASS INDEX: 30.84 KG/M2 | HEIGHT: 74 IN | WEIGHT: 240.31 LBS | RESPIRATION RATE: 16 BRPM | DIASTOLIC BLOOD PRESSURE: 82 MMHG | SYSTOLIC BLOOD PRESSURE: 128 MMHG | OXYGEN SATURATION: 96 %

## 2023-06-29 DIAGNOSIS — E66.9 OBESITY (BMI 30.0-34.9): ICD-10-CM

## 2023-06-29 DIAGNOSIS — Z98.84 HISTORY OF BARIATRIC SURGERY: ICD-10-CM

## 2023-06-29 DIAGNOSIS — I10 ESSENTIAL HYPERTENSION: ICD-10-CM

## 2023-06-29 DIAGNOSIS — E78.00 HYPERCHOLESTEROLEMIA: ICD-10-CM

## 2023-06-29 DIAGNOSIS — E11.9 DIABETES MELLITUS WITHOUT COMPLICATION: Primary | ICD-10-CM

## 2023-06-29 PROCEDURE — 3060F POS MICROALBUMINURIA REV: CPT | Mod: CPTII,S$GLB,, | Performed by: INTERNAL MEDICINE

## 2023-06-29 PROCEDURE — 3288F PR FALLS RISK ASSESSMENT DOCUMENTED: ICD-10-PCS | Mod: CPTII,S$GLB,, | Performed by: INTERNAL MEDICINE

## 2023-06-29 PROCEDURE — 1159F PR MEDICATION LIST DOCUMENTED IN MEDICAL RECORD: ICD-10-PCS | Mod: CPTII,S$GLB,, | Performed by: INTERNAL MEDICINE

## 2023-06-29 PROCEDURE — 1126F PR PAIN SEVERITY QUANTIFIED, NO PAIN PRESENT: ICD-10-PCS | Mod: CPTII,S$GLB,, | Performed by: INTERNAL MEDICINE

## 2023-06-29 PROCEDURE — 3066F NEPHROPATHY DOC TX: CPT | Mod: CPTII,S$GLB,, | Performed by: INTERNAL MEDICINE

## 2023-06-29 PROCEDURE — 1101F PT FALLS ASSESS-DOCD LE1/YR: CPT | Mod: CPTII,S$GLB,, | Performed by: INTERNAL MEDICINE

## 2023-06-29 PROCEDURE — 4010F ACE/ARB THERAPY RXD/TAKEN: CPT | Mod: CPTII,S$GLB,, | Performed by: INTERNAL MEDICINE

## 2023-06-29 PROCEDURE — 1126F AMNT PAIN NOTED NONE PRSNT: CPT | Mod: CPTII,S$GLB,, | Performed by: INTERNAL MEDICINE

## 2023-06-29 PROCEDURE — 3008F BODY MASS INDEX DOCD: CPT | Mod: CPTII,S$GLB,, | Performed by: INTERNAL MEDICINE

## 2023-06-29 PROCEDURE — 3079F PR MOST RECENT DIASTOLIC BLOOD PRESSURE 80-89 MM HG: ICD-10-PCS | Mod: CPTII,S$GLB,, | Performed by: INTERNAL MEDICINE

## 2023-06-29 PROCEDURE — 3079F DIAST BP 80-89 MM HG: CPT | Mod: CPTII,S$GLB,, | Performed by: INTERNAL MEDICINE

## 2023-06-29 PROCEDURE — 3066F PR DOCUMENTATION OF TREATMENT FOR NEPHROPATHY: ICD-10-PCS | Mod: CPTII,S$GLB,, | Performed by: INTERNAL MEDICINE

## 2023-06-29 PROCEDURE — 3074F PR MOST RECENT SYSTOLIC BLOOD PRESSURE < 130 MM HG: ICD-10-PCS | Mod: CPTII,S$GLB,, | Performed by: INTERNAL MEDICINE

## 2023-06-29 PROCEDURE — 3052F PR MOST RECENT HEMOGLOBIN A1C LEVEL 8.0 - < 9.0%: ICD-10-PCS | Mod: CPTII,S$GLB,, | Performed by: INTERNAL MEDICINE

## 2023-06-29 PROCEDURE — 3288F FALL RISK ASSESSMENT DOCD: CPT | Mod: CPTII,S$GLB,, | Performed by: INTERNAL MEDICINE

## 2023-06-29 PROCEDURE — 3060F PR POS MICROALBUMINURIA RESULT DOCUMENTED/REVIEW: ICD-10-PCS | Mod: CPTII,S$GLB,, | Performed by: INTERNAL MEDICINE

## 2023-06-29 PROCEDURE — 3074F SYST BP LT 130 MM HG: CPT | Mod: CPTII,S$GLB,, | Performed by: INTERNAL MEDICINE

## 2023-06-29 PROCEDURE — 3052F HG A1C>EQUAL 8.0%<EQUAL 9.0%: CPT | Mod: CPTII,S$GLB,, | Performed by: INTERNAL MEDICINE

## 2023-06-29 PROCEDURE — 99213 PR OFFICE/OUTPT VISIT, EST, LEVL III, 20-29 MIN: ICD-10-PCS | Mod: S$GLB,,, | Performed by: INTERNAL MEDICINE

## 2023-06-29 PROCEDURE — 1159F MED LIST DOCD IN RCRD: CPT | Mod: CPTII,S$GLB,, | Performed by: INTERNAL MEDICINE

## 2023-06-29 PROCEDURE — 99213 OFFICE O/P EST LOW 20 MIN: CPT | Mod: S$GLB,,, | Performed by: INTERNAL MEDICINE

## 2023-06-29 PROCEDURE — 3008F PR BODY MASS INDEX (BMI) DOCUMENTED: ICD-10-PCS | Mod: CPTII,S$GLB,, | Performed by: INTERNAL MEDICINE

## 2023-06-29 PROCEDURE — 4010F PR ACE/ARB THEARPY RXD/TAKEN: ICD-10-PCS | Mod: CPTII,S$GLB,, | Performed by: INTERNAL MEDICINE

## 2023-06-29 PROCEDURE — 1101F PR PT FALLS ASSESS DOC 0-1 FALLS W/OUT INJ PAST YR: ICD-10-PCS | Mod: CPTII,S$GLB,, | Performed by: INTERNAL MEDICINE

## 2023-06-29 RX ORDER — GLIMEPIRIDE 2 MG/1
2 TABLET ORAL
Qty: 90 TABLET | Refills: 3 | Status: SHIPPED | OUTPATIENT
Start: 2023-06-29 | End: 2023-07-05 | Stop reason: ALTCHOICE

## 2023-06-29 NOTE — PROGRESS NOTES
Subjective     Patient ID: John Martini is a 73 y.o. male.    Chief Complaint: Follow-up (Follow up lab)    Presents for follow-up of his glycemic control based on laboratory evaluation obtained at this facility on June 27, 2023.  Hemoglobin A1c returned at 8.4% giving him an average glucose over the last 60 days of 194.  Patient claims compliance to his therapy including metformin extended release formulation 1500 mg total every day with food and Glyxambi 1 by mouth every day.  Had his glycemia control since 2021 so is time to change the treatment.  Patient is aware of the fact that his caloric intake has been inappropriate.    Glimepiride 2 mg 1 by mouth every day will be added at this time and patient is aware of the fact that this could actually cause hypoglycemia.  He knows how to recognize and treat same.      Plans are for him to do capillary blood glucose measurements at home alternating fasting with 2 hour postprandial testing 1 time a day and bring the record to the next appointment.      Being that this medication is going to be a long-term prescription [facility] as [facility] his [facility] so the chances are he will not get it in until about July 5, 2023.  Either way, patient has been scheduled to return to the clinic July 24th with his [facility] record for further evaluation and assessment of efficacy and safety of this treatment.      Vital signs are stable with a blood pressure 128/82.      Medication list has been reviewed and there is no need for any other refills.      He is had an eye examination this past February 2023 and I have requested for the clinical care coordinator to update that care gap information.  No other care gaps pending.      Patient otherwise remains cardiovascularly and central nervous system wise intact.      Diet and therapeutic lifestyle changes have been stressed.      He has enough testing supplies at home.    Review of Systems   All other systems  reviewed and are negative.       Objective     Physical Exam  Vitals and nursing note reviewed.   Constitutional:       General: He is not in acute distress.     Appearance: Normal appearance. He is obese. He is not ill-appearing, toxic-appearing or diaphoretic.   HENT:      Head: Normocephalic and atraumatic.   Cardiovascular:      Rate and Rhythm: Normal rate and regular rhythm.      Pulses: Normal pulses.   Pulmonary:      Effort: Pulmonary effort is normal.   Skin:     General: Skin is warm and dry.      Coloration: Skin is not jaundiced or pale.   Neurological:      General: No focal deficit present.      Mental Status: He is alert and oriented to person, place, and time. Mental status is at baseline.      Cranial Nerves: No cranial nerve deficit.      Sensory: No sensory deficit.      Motor: No weakness.      Coordination: Coordination normal.      Gait: Gait normal.   Psychiatric:         Mood and Affect: Mood normal.         Behavior: Behavior normal.         Thought Content: Thought content normal.         Judgment: Judgment normal.          Assessment and Plan     1. Diabetes mellitus without complication  -     glimepiride (AMARYL) 2 MG tablet; Take 1 tablet (2 mg total) by mouth before breakfast.  Dispense: 90 tablet; Refill: 3    2. History of bariatric surgery    3. Essential hypertension    4. Obesity (BMI 30.0-34.9)    5. Hypercholesterolemia      Patient's glycemic control has been poor for the last 3 years so this time treatment is to be modified to include an oral hypoglycemic agent in the form of glimepiride 2 mg by mouth every morning.  Prescription has been sent to his mail order pharmacy and is to started as soon as he receives same.      Patient is to test capillary blood glucose is 1 time a day alternating fasting with 2 hour postprandial.  He has enough testing supplies at home.  He is to bring the record to the follow-up appointment which is scheduled for July 24, 2023.      He is aware of  how to recognize hypoglycemia on how to deal with same.      Blood pressure is well controlled 128/82 with current therapy.      Medication list has been reviewed and there is no need for any refills.      He is tolerating his atorvastatin 40 mg at bedtime well.      Next wellness examination will be due mid December of 2023         No follow-ups on file.

## 2023-06-30 ENCOUNTER — PATIENT OUTREACH (OUTPATIENT)
Dept: ADMINISTRATIVE | Facility: HOSPITAL | Age: 73
End: 2023-06-30
Payer: MEDICARE

## 2023-06-30 NOTE — LETTER
AUTHORIZATION FOR RELEASE OF   CONFIDENTIAL INFORMATION    Dear Marianna Montelongo MD,    We are seeing John Martini, date of birth 1950, in the clinic at [facility]. Eric Freeman MD is the patient's PCP. John Martini has an outstanding lab/procedure at the time we reviewed his chart. In order to help keep his health information updated, he has authorized us to request the following medical record(s):        (  )  MAMMOGRAM                                      (  )  COLONOSCOPY      (  )  PAP SMEAR                                          (  )  OUTSIDE LAB RESULTS     (  )  DEXA SCAN                                          (X) DIABETIC EYE EXAM            (  )  FOOT EXAM                                          (  )  ENTIRE RECORD     (  )  OUTSIDE IMMUNIZATIONS                 (  )  _______________         Please fax records to [facility], Roberto H Lopez-Santini, MD, [phone]    If you have any questions, please contact Jamir Beaulieu LPN Care Coordinator  at [phone].            Patient Name: John Martini  : 1950  Patient Phone #: [phone]

## 2023-06-30 NOTE — PROGRESS NOTES

## 2023-07-05 ENCOUNTER — TELEPHONE (OUTPATIENT)
Dept: FAMILY MEDICINE | Facility: CLINIC | Age: 73
End: 2023-07-05
Payer: MEDICARE

## 2023-07-05 ENCOUNTER — PATIENT OUTREACH (OUTPATIENT)
Dept: ADMINISTRATIVE | Facility: HOSPITAL | Age: 73
End: 2023-07-05
Payer: MEDICARE

## 2023-07-05 DIAGNOSIS — E11.9 DIABETES MELLITUS WITHOUT COMPLICATION: Primary | ICD-10-CM

## 2023-07-05 RX ORDER — REPAGLINIDE 1 MG/1
1 TABLET ORAL
Qty: 270 TABLET | Refills: 3 | Status: SHIPPED | OUTPATIENT
Start: 2023-07-05 | End: 2024-07-04

## 2023-07-05 NOTE — TELEPHONE ENCOUNTER
Spoke with patient who reports that his allergy with glipizide caused him to have hives. Dr. Lawson notified. Instructed to call with any questions or concerns. GOLD Acosta

## 2023-07-05 NOTE — PROGRESS NOTES
Population Health Chart Review & Patient Outreach Details:     Reason for Outreach Encounter:     []  Non-Compliant Report   []  Payor Report ([employer], PHN, BCBS, MSSP, MCIP, UHC, etc.)   []  Pre-Visit Chart Review     Updates Requested / Reviewed:     [x]  Care Everywhere    [x]     []  External Sources ([facility], [facility], DIS, etc.)   [x]  Care Team Updated    Patient Outreach Method:    []  Telephone Outreach Completed   [] Successful   [] Left Voicemail   [] Unable to Contact (wrong number, no voicemail)  []  [employer] Portal Outreach Sent  []  Letter Outreach Mailed  []  Fax Sent for External Records  [x]  External Records Upload    Health Maintenance Topics Addressed and Outreach Outcomes / Actions Taken:        []      Breast Cancer Screening []  Mammo Scheduled      []  External Records Requested     []  Added Reminder to Complete to Upcoming Primary Care Appt Notes     []  Patient Declined     []  Patient Will Call Back to Schedule     []  Patient Will Schedule with External Provider / Order Routed if Applicable             []       Cervical Cancer Screening []  Pap Scheduled      []  External Records Requested     []  Added Reminder to Complete to Upcoming Primary Care Appt Notes     []  Patient Declined     []  Patient Will Call Back to Schedule     []  Patient Will Schedule with External Provider               []          Colorectal Cancer Screening []  Colonoscopy Case Request or Referral Placed     []  External Records Requested     []  Added Reminder to Complete to Upcoming Primary Care Appt Notes     []  Patient Declined     []  Patient Will Call Back to Schedule     []  Patient Will Schedule with External Provider     []  Fit Kit Mailed (add the SmartPhrase under additional notes)     []  Reminded Patient to Complete Home Test             [x]      Diabetic Eye Exam []  Eye Camera Scheduled or Optometry Referral Placed     []  External Records Requested     []  Added Reminder to Complete to  Upcoming Primary Care Appt Notes     []  Patient Declined     []  Patient Will Call Back to Schedule     []  Patient Will Schedule with External Provider             []      Blood Pressure Control []  Primary Care Follow Up Visit Scheduled     []  Remote Blood Pressure Reading Captured     []  Added Reminder to Complete to Upcoming Primary Care Appt Notes     []  Patient Declined     []  Patient Will Call Back / Patient Will Send Portal Message with Reading     []  Patient Will Call Back to Schedule Provider Visit             []       HbA1c & Other Labs []  Lab Appt Scheduled for Due Labs     []  Primary Care Follow Up Visit Scheduled      []  Reminded Patient to Complete Home Test     []  Added Reminder to Complete to Upcoming Primary Care Appt Notes     []  Patient Declined     []  Patient Will Call Back to Schedule     []  Patient Will Schedule with External Provider / Order Routed if Applicable           []    Schedule Primary Care Appt []  Primary Care Appt Scheduled     []  Patient Declined     []  Patient Will Call Back to Schedule     []  Pt Established with External Provider & Updated Care Team             []      Medication Adherence []  Primary Care Appointment Scheduled     []  Added Reminder to Upcoming Primary Care Appt Notes     []  Patient Reminded to  Prescription     []  Patient Declined, Provider Notified if Needed     []  Sent Provider Message to Review and/or Add Exclusion to Problem List             []      Osteoporosis Screening []  DXA Appointment Scheduled     []  External Records Requested     []  Added Reminder to Complete to Upcoming Primary Care Appt Notes     []  Patient Declined     []  Patient Will Call Back to Schedule     []  Patient Will Schedule with External Provider / Order Routed if Applicable     Additional Care Coordinator Notes:         Further Action Needed If Patient Returns Outreach:

## 2023-07-24 ENCOUNTER — OFFICE VISIT (OUTPATIENT)
Dept: FAMILY MEDICINE | Facility: CLINIC | Age: 73
End: 2023-07-24
Payer: MEDICARE

## 2023-07-24 VITALS
HEART RATE: 62 BPM | OXYGEN SATURATION: 96 % | BODY MASS INDEX: 31.4 KG/M2 | HEIGHT: 74 IN | SYSTOLIC BLOOD PRESSURE: 138 MMHG | RESPIRATION RATE: 16 BRPM | WEIGHT: 244.69 LBS | DIASTOLIC BLOOD PRESSURE: 82 MMHG

## 2023-07-24 DIAGNOSIS — E11.43 POORLY CONTROLLED TYPE 2 DIABETES MELLITUS WITH AUTONOMIC NEUROPATHY: Primary | ICD-10-CM

## 2023-07-24 DIAGNOSIS — E11.65 POORLY CONTROLLED TYPE 2 DIABETES MELLITUS WITH AUTONOMIC NEUROPATHY: Primary | ICD-10-CM

## 2023-07-24 DIAGNOSIS — E78.00 HYPERCHOLESTEROLEMIA: ICD-10-CM

## 2023-07-24 DIAGNOSIS — Z98.84 H/O BARIATRIC SURGERY: ICD-10-CM

## 2023-07-24 DIAGNOSIS — E66.9 OBESITY (BMI 30.0-34.9): ICD-10-CM

## 2023-07-24 DIAGNOSIS — R00.1 BRADYCARDIA: ICD-10-CM

## 2023-07-24 PROCEDURE — 3079F PR MOST RECENT DIASTOLIC BLOOD PRESSURE 80-89 MM HG: ICD-10-PCS | Mod: CPTII,S$GLB,, | Performed by: INTERNAL MEDICINE

## 2023-07-24 PROCEDURE — 3060F POS MICROALBUMINURIA REV: CPT | Mod: CPTII,S$GLB,, | Performed by: INTERNAL MEDICINE

## 2023-07-24 PROCEDURE — 99213 OFFICE O/P EST LOW 20 MIN: CPT | Mod: S$GLB,,, | Performed by: INTERNAL MEDICINE

## 2023-07-24 PROCEDURE — 4010F PR ACE/ARB THEARPY RXD/TAKEN: ICD-10-PCS | Mod: CPTII,S$GLB,, | Performed by: INTERNAL MEDICINE

## 2023-07-24 PROCEDURE — 4010F ACE/ARB THERAPY RXD/TAKEN: CPT | Mod: CPTII,S$GLB,, | Performed by: INTERNAL MEDICINE

## 2023-07-24 PROCEDURE — 3079F DIAST BP 80-89 MM HG: CPT | Mod: CPTII,S$GLB,, | Performed by: INTERNAL MEDICINE

## 2023-07-24 PROCEDURE — 3066F PR DOCUMENTATION OF TREATMENT FOR NEPHROPATHY: ICD-10-PCS | Mod: CPTII,S$GLB,, | Performed by: INTERNAL MEDICINE

## 2023-07-24 PROCEDURE — 1159F MED LIST DOCD IN RCRD: CPT | Mod: CPTII,S$GLB,, | Performed by: INTERNAL MEDICINE

## 2023-07-24 PROCEDURE — 3288F FALL RISK ASSESSMENT DOCD: CPT | Mod: CPTII,S$GLB,, | Performed by: INTERNAL MEDICINE

## 2023-07-24 PROCEDURE — 3075F SYST BP GE 130 - 139MM HG: CPT | Mod: CPTII,S$GLB,, | Performed by: INTERNAL MEDICINE

## 2023-07-24 PROCEDURE — 3075F PR MOST RECENT SYSTOLIC BLOOD PRESS GE 130-139MM HG: ICD-10-PCS | Mod: CPTII,S$GLB,, | Performed by: INTERNAL MEDICINE

## 2023-07-24 PROCEDURE — 1101F PT FALLS ASSESS-DOCD LE1/YR: CPT | Mod: CPTII,S$GLB,, | Performed by: INTERNAL MEDICINE

## 2023-07-24 PROCEDURE — 1159F PR MEDICATION LIST DOCUMENTED IN MEDICAL RECORD: ICD-10-PCS | Mod: CPTII,S$GLB,, | Performed by: INTERNAL MEDICINE

## 2023-07-24 PROCEDURE — 99213 PR OFFICE/OUTPT VISIT, EST, LEVL III, 20-29 MIN: ICD-10-PCS | Mod: S$GLB,,, | Performed by: INTERNAL MEDICINE

## 2023-07-24 PROCEDURE — 1101F PR PT FALLS ASSESS DOC 0-1 FALLS W/OUT INJ PAST YR: ICD-10-PCS | Mod: CPTII,S$GLB,, | Performed by: INTERNAL MEDICINE

## 2023-07-24 PROCEDURE — 3052F HG A1C>EQUAL 8.0%<EQUAL 9.0%: CPT | Mod: CPTII,S$GLB,, | Performed by: INTERNAL MEDICINE

## 2023-07-24 PROCEDURE — 3060F PR POS MICROALBUMINURIA RESULT DOCUMENTED/REVIEW: ICD-10-PCS | Mod: CPTII,S$GLB,, | Performed by: INTERNAL MEDICINE

## 2023-07-24 PROCEDURE — 3008F BODY MASS INDEX DOCD: CPT | Mod: CPTII,S$GLB,, | Performed by: INTERNAL MEDICINE

## 2023-07-24 PROCEDURE — 3052F PR MOST RECENT HEMOGLOBIN A1C LEVEL 8.0 - < 9.0%: ICD-10-PCS | Mod: CPTII,S$GLB,, | Performed by: INTERNAL MEDICINE

## 2023-07-24 PROCEDURE — 3288F PR FALLS RISK ASSESSMENT DOCUMENTED: ICD-10-PCS | Mod: CPTII,S$GLB,, | Performed by: INTERNAL MEDICINE

## 2023-07-24 PROCEDURE — 3066F NEPHROPATHY DOC TX: CPT | Mod: CPTII,S$GLB,, | Performed by: INTERNAL MEDICINE

## 2023-07-24 PROCEDURE — 1126F AMNT PAIN NOTED NONE PRSNT: CPT | Mod: CPTII,S$GLB,, | Performed by: INTERNAL MEDICINE

## 2023-07-24 PROCEDURE — 3008F PR BODY MASS INDEX (BMI) DOCUMENTED: ICD-10-PCS | Mod: CPTII,S$GLB,, | Performed by: INTERNAL MEDICINE

## 2023-07-24 PROCEDURE — 1126F PR PAIN SEVERITY QUANTIFIED, NO PAIN PRESENT: ICD-10-PCS | Mod: CPTII,S$GLB,, | Performed by: INTERNAL MEDICINE

## 2023-07-24 NOTE — PROGRESS NOTES
Subjective     Patient ID: John Martini is a 73 y.o. male.    Chief Complaint: Follow-up (Follow up DM)    Patient presents for follow-up of his glycemic control.  He had had an allergic reaction to glimepiride reason why the medication was discontinued and he was switched over to repaglinide.  The only problem is that he does at times forget to take the medication right before meals.      He does have the pill bottle that goes in his key ring to make sure that when he leaves the house he always has it with him.      He can see a definite improvement on the glycemic control with the glucose this morning fasting been 96 on after a meal 186.  This is excellent values and issued reflect on his follow-up hemoglobin A1c which will be scheduled for the 3rd week in October with a goal of it being equal to or less than    Patient has a home both the bowel of the glimepiride and the repaglinide.  He was asking me if he was okay for him to try the glimepiride again.  Being that he actually had whelps from same I have recommended against doing so.      He has enough testing supplies at home to test 1 time a day being that is not on insulin therapy.      Blood pressure is controlled 138/82.    Patient is aware of how to be able to identify hypoglycemia and treated appropriately.    Review of Systems   All other systems reviewed and are negative.       Objective     Physical Exam  Vitals and nursing note reviewed.   Constitutional:       General: He is not in acute distress.     Appearance: Normal appearance. He is obese. He is not ill-appearing, toxic-appearing or diaphoretic.   HENT:      Head: Normocephalic and atraumatic.   Cardiovascular:      Rate and Rhythm: Normal rate and regular rhythm.      Pulses: Normal pulses.   Pulmonary:      Effort: Pulmonary effort is normal.   Skin:     General: Skin is warm and dry.   Neurological:      General: No focal deficit present.      Mental Status: He is alert and oriented to  person, place, and time. Mental status is at baseline.      Cranial Nerves: No cranial nerve deficit.      Sensory: No sensory deficit.      Motor: No weakness.      Coordination: Coordination normal.      Gait: Gait normal.          Assessment and Plan     1. Poorly controlled type 2 diabetes mellitus with autonomic neuropathy    2. Obesity (BMI 30.0-34.9)    3. Hypercholesterolemia    4. H/O bariatric surgery    5. Bradycardia        With regards to his glycemic control is improved with the use of the Prandin before meals and is continue as is.  Patient has also been following his diet stricter.  He has enough testing supplies at home and continues to test only 1 time a day.      Plans are for the patient to return to the clinic 3rd week in October of 2023 for repeat hemoglobin A1c with a follow-up appointment.      Review of system is otherwise unremarkable.      He is in the low 60s with regards to pulse but asymptomatic.  This is not a new finding.      Blood pressure is well controlled 138/82.    Patient knows how to recognize and treat hypoglycemia.         No follow-ups on file.

## 2023-08-13 DIAGNOSIS — N40.1 BENIGN PROSTATIC HYPERPLASIA WITH INCOMPLETE BLADDER EMPTYING: ICD-10-CM

## 2023-08-13 DIAGNOSIS — R39.14 BENIGN PROSTATIC HYPERPLASIA WITH INCOMPLETE BLADDER EMPTYING: ICD-10-CM

## 2023-08-14 RX ORDER — TAMSULOSIN HYDROCHLORIDE 0.4 MG/1
1 CAPSULE ORAL NIGHTLY
Qty: 90 CAPSULE | Refills: 3 | Status: SHIPPED | OUTPATIENT
Start: 2023-08-14

## 2024-01-05 NOTE — PATIENT INSTRUCTIONS
Simple Skin Ulcer  A skin ulcer is a sore on the skin. Skin ulcers often form when blood circulation is impaired. Being bed- or wheelchair-bound can cause pressure that may lead to skin ulcers. Ulcers are generally round areas of red, swollen, thickened skin around a crater-like depression. They are often very slow to heal. If a skin ulcer isn't properly treated, it may become infected. If the infection spreads, it can cause serious health issues.    Symptoms of a skin ulcer include:  Reddish area on the skin  Skin color and texture changes  Swelling  Wound that isn't healing  Crater in the skin  Pain  Drainage or pus    Causes  There are many causes of skin ulcers. Some of these include:  Decreased blood flow to a part of the skin, vascular insufficiency  Trauma  Lack of movement of a part of the body for long periods of time  Infection  Poor hygiene  Varicose veins  Vitamin deficiency    Pressure ulcers  Pressure ulcers are a type of ulcer most commonly seen in people who are confined to bed or a wheelchair. They are caused by prolonged pressure to a spot on the skin. Pressure ulcers usually occur on the back, buttocks, or backs or sides of the legs, arms, or feet (especially the heels).    Home care  You may be prescribed antibiotics to prevent infection. If this is the case, be sure to take all of the medicine, even if your symptoms get better. You may also be given medicines to help relieve pain. Follow the healthcare providers instructions when using these medicines.    General care  Care for the skin ulcer as instructed. Always wash your hands with soap and warm water before and after caring for your wound.  Cover the ulcer with a clean, dry bandage. Remove and change the bandage as instructed. If the bandage becomes wet or dirty, change it as soon as possible.  Follow the doctors instructions about washing. You can shower, but do not soak the healing ulcer until the doctor says its OK.  Do not scratch,  rub, or pick at the healing skin.  Check the area every day for signs of infection, such as increasing pain, redness, warmth, red streaking, swelling, or pus draining from the ulcer.  When resting, raise the area where the ulcer is above the level of the heart.  Avoid smoking or drinking alcohol, as these can delay wound healing.  If you are able, try to walk regularly. This can help with circulation.  Avoid prolonged standing or sitting in one position.  The following tips can help prevent future skin ulcers:  Know your risks for skin ulcers.  Keep the skin clean and dry.  Reposition frequently.  Use protective devices such as pillows, foam wedges, and heel protectors for the knees, ankles, and heels.  Avoid immobilization.    Follow-up care  Follow up with your healthcare provider, or as advised.    When to seek medical advice  Call your healthcare provider right away if any of these occur:  Fever of 100.4°F (38°C) or higher, or as advised by your healthcare provider  Signs of infection. These include increasing pain, warmth, redness, or pus draining from the skin ulcer.  Bleeding from the skin ulcer  Pain in or around the ulcer that doesn't get better even with medicines  Increased swelling  Changes in skin color    Date Last Reviewed: 9/1/2016  © 3964-0906 [employer]. [address] [place] [address], [place] [address]. All rights reserved. This information is not intended as a substitute for professional medical care. Always follow your healthcare professional's instructions.       What is a Corn? What is a Callus?    Corns and calluses are areas of thickened skin that develop to protect that area from irritation. They occur when something rubs against the foot repeatedly or causes excess pressure against part of the foot. The term callus commonly is used if the thickening of skin occurs on the bottom of the foot, and if thickening occurs on the top of the foot (or toe), it's called a corn. However, the  location of the thickened skin is less important than the pattern of thickening: flat, widespread skin thickening indicates a callus, and skin lesions that are thicker or deeper indicate a corn.    Corns and calluses are not contagious but may become painful if they get too thick. In people with diabetes or decreased circulation, they can lead to more serious foot problems.      Causes  Corns often occur where a toe rubs against the interior of a shoe. Excessive pressure at the balls of the feet--common in women who regularly wear high heels--may cause calluses to develop on the balls of the feet.    People with certain deformities of the foot, such as hammer toes, are prone to corns and calluses.      Symptoms  Corns and calluses typically have a rough, dull appearance. They may be raised or rounded, and they can be hard to differentiate from warts. Corns or calluses sometimes cause pain.      Home Care  Mild corns and calluses may not require treatment. If the corn or callus isn't bothering you, it can probably be left alone. It's a good idea, though, to investigate possible causes of the corn or callus. If your footwear is contributing to the development of a corn or callus, it's time to look for other shoes.    Over-the-counter treatments can do more harm than good, especially if you have any medical conditions such as diabetes. Some over-the-counter treatments contain harsh chemicals, which can lead to burns or even foot ulcers.       When to Visit a Podiatrist  If corns or calluses are causing pain and discomfort or inhibiting your daily life in any way, see a podiatrist. Also, people with diabetes, poor circulation, or other serious illnesses should have their feet checked.      Diagnosis and Treatment  The podiatrist will conduct a complete examination of your feet. X-rays may be taken; your podiatrist may also want to inspect your shoes and watch you walk. He or she will also take a complete medical  history. Corns and calluses are diagnosed based on appearance and history.    If you have mild corns or calluses, your podiatrist may suggest changing your shoes and/or adding padding to your shoes. Larger corns and calluses are most effectively reduced (made smaller) with a surgical blade. A podiatrist can use the blade to carefully shave away the thickened, dead skin--right in the office. The procedure is painless because the skin is already dead. Additional treatments may be needed if the corn or callus recurs.    Cortisone injections into the foot or toe may be given if the corn or callus is causing significant pain. Surgery may be necessary in cases that do not respond to conservative treatment.      Prevention  Wear properly fitted shoes. If you have any deformities of the toe or foot, talk to your podiatrist to find out what shoes are best for you.  Gel pad inserts may decrease friction points and pressure. Your podiatrist can help you determine where pads might be useful.

## 2024-01-23 ENCOUNTER — OFFICE VISIT (OUTPATIENT)
Dept: PODIATRY | Facility: CLINIC | Age: 74
End: 2024-01-23
Payer: MEDICARE

## 2024-01-23 VITALS — RESPIRATION RATE: 18 BRPM | WEIGHT: 249.13 LBS | BODY MASS INDEX: 31.97 KG/M2 | HEIGHT: 74 IN

## 2024-01-23 DIAGNOSIS — L85.1 ACQUIRED KERATODERMA: ICD-10-CM

## 2024-01-23 DIAGNOSIS — E11.9 ENCOUNTER FOR DIABETIC FOOT EXAM: ICD-10-CM

## 2024-01-23 DIAGNOSIS — Q66.72 PES CAVUS OF BOTH FEET: ICD-10-CM

## 2024-01-23 DIAGNOSIS — Q66.71 PES CAVUS OF BOTH FEET: ICD-10-CM

## 2024-01-23 DIAGNOSIS — L97.502 ULCER OF FOOT WITH FAT LAYER EXPOSED, UNSPECIFIED LATERALITY: ICD-10-CM

## 2024-01-23 DIAGNOSIS — E11.42 DIABETIC POLYNEUROPATHY ASSOCIATED WITH TYPE 2 DIABETES MELLITUS: Primary | ICD-10-CM

## 2024-01-23 PROCEDURE — 4010F ACE/ARB THERAPY RXD/TAKEN: CPT | Mod: CPTII,S$GLB,, | Performed by: PODIATRIST

## 2024-01-23 PROCEDURE — 3288F FALL RISK ASSESSMENT DOCD: CPT | Mod: CPTII,S$GLB,, | Performed by: PODIATRIST

## 2024-01-23 PROCEDURE — 1126F AMNT PAIN NOTED NONE PRSNT: CPT | Mod: CPTII,S$GLB,, | Performed by: PODIATRIST

## 2024-01-23 PROCEDURE — 1159F MED LIST DOCD IN RCRD: CPT | Mod: CPTII,S$GLB,, | Performed by: PODIATRIST

## 2024-01-23 PROCEDURE — 99204 OFFICE O/P NEW MOD 45 MIN: CPT | Mod: S$GLB,,, | Performed by: PODIATRIST

## 2024-01-23 PROCEDURE — 99999 PR PBB SHADOW E&M-EST. PATIENT-LVL III: CPT | Mod: PBBFAC,,, | Performed by: PODIATRIST

## 2024-01-23 PROCEDURE — 1160F RVW MEDS BY RX/DR IN RCRD: CPT | Mod: CPTII,S$GLB,, | Performed by: PODIATRIST

## 2024-01-23 PROCEDURE — 3008F BODY MASS INDEX DOCD: CPT | Mod: CPTII,S$GLB,, | Performed by: PODIATRIST

## 2024-01-23 PROCEDURE — 1101F PT FALLS ASSESS-DOCD LE1/YR: CPT | Mod: CPTII,S$GLB,, | Performed by: PODIATRIST

## 2024-01-23 RX ORDER — MINERAL OIL
1 ENEMA (ML) RECTAL DAILY
COMMUNITY

## 2024-01-23 NOTE — PROGRESS NOTES
"  [address] [facility] [address]  [place] [address]  Phone: [phone]   [phone]    Patient's PCP:No, Primary Doctor  Referring Provider: Dr. Eric Brown*    Subjective:      Chief Complaint:: Wound Check (Right foot ) and Foot Ulcer    Wound Check    Foot Ulcer  Associated symptoms include numbness. Pertinent negatives include no abdominal pain, arthralgias, chest pain, chills, coughing, fatigue, fever, headaches, joint swelling, myalgias, nausea, neck pain, rash or weakness.     John Martini is a 73 y.o. male who presents today with a complaint of right foot ulcer. The current episode started about a month ago.  The symptoms include oozing fluid , discoloration and a huge blister that ruptured. Probable cause of complaint real high arch and room rubbed against shoe.  The symptoms are aggravated by none. The problem has improved. Treatment to date have included antibiotic and and wound care for the last month which provided some relief. Patient has home health that comes every thurday and he goes to wound care 1 x a week. Patient presents today in surgical shoe, bandage and cast gauze.     Systemic Doctor: Dr. Eric Zamudio  Date Last Seen: 10/19/2023  Blood Sugar: 85  Hemoglobin A1c: 8.4 on 06/27/2023    Vitals:    01/23/24 0956   Resp: 18   Weight: 113 kg (249 lb 1.9 oz)   Height: 6' 2" (1.88 m)   PainSc: 0-No pain      Shoe Size: 10.5    Past Surgical History:   Procedure Laterality Date    COLONOSCOPY      GASTRIC BYPASS N/A      Past Medical History:   Diagnosis Date    Allergy     Diabetes mellitus, type 2     GERD (gastroesophageal reflux disease)     Hyperlipidemia     Hypertension     Hyperthyroidism     Personal history of colonic polyps      Family History   Problem Relation Age of Onset    Hyperlipidemia Mother     Stroke Mother     Heart disease Father         Social History:   Marital Status:   Alcohol History:  has no history on file for alcohol use.  Tobacco " "History:  reports that he has quit smoking. He has never used smokeless tobacco.  Drug History:  has no history on file for drug use.    Review of patient's allergies indicates:   Allergen Reactions    Glipizide Hives       Current Outpatient Medications   Medication Sig Dispense Refill    amLODIPine (NORVASC) 5 MG tablet TAKE 1 TABLET DAILY 90 tablet 3    atorvastatin (LIPITOR) 40 MG tablet TAKE 1 TABLET AT BEDTIME 90 tablet 3    blood sugar diagnostic (ONETOUCH VERIO TEST STRIPS) Strp USE 1 STRIP VIA METER TWICE A DAY AS DIRECTED 100 strip 3    gabapentin (NEURONTIN) 300 MG capsule TAKE 1 CAPSULE IN THE MORNING AND 2 CAPSULES AT BEDTIME 270 capsule 3    GLYXAMBI 25-5 mg Tab TAKE 1 TABLET EVERY MORNING 90 tablet 3    insulin degludec (TRESIBA FLEXTOUCH U-200) 200 unit/mL (3 mL) insulin pen Inject 40 units subcutaneously every night 6 pen 6    irbesartan (AVAPRO) 150 MG tablet TAKE 1 TABLET DAILY 90 tablet 3    levothyroxine (SYNTHROID) 137 MCG Tab tablet TAKE 2 TABLETS EVERY SUNDAY ON AN EMPTY STOMACH 24 tablet 3    loratadine (CLARITIN) 10 mg tablet Take 10 mg by mouth once daily.      metFORMIN (GLUCOPHAGE-XR) 500 MG ER 24hr tablet TAKE 3 TABLETS EVERY EVENING 270 tablet 3    multivitamin (MULTIPLE VITAMINS DAILY ORAL) Take 1 tablet by mouth once daily.      pantoprazole (PROTONIX) 40 MG tablet TAKE 1 TABLET DAILY 90 tablet 3    pen needle, diabetic (BD ULTRA-FINE SHORT PEN NEEDLE) 31 gauge x 5/16" Ndle Use to administer Tresiba every night 100 each 3    repaglinide (PRANDIN) 1 MG tablet Take 1 tablet (1 mg total) by mouth 3 (three) times daily before meals. 270 tablet 3    tamsulosin (FLOMAX) 0.4 mg Cap TAKE 1 CAPSULE AT BEDTIME 90 capsule 3    fexofenadine (ALLEGRA ALLERGY) 180 MG tablet Take 1 tablet by mouth once daily.      levothyroxine (SYNTHROID) 50 MCG tablet TAKE 1 TABLET EVERY MORNING ONE HOUR BEFORE A MEAL AND PRIOR TO OTHER MEDICATIONS (Patient not taking: Reported on 1/23/2024) 90 tablet 3     No " current facility-administered medications for this visit.       Review of Systems   Constitutional:  Negative for chills, fatigue, fever and unexpected weight change.   HENT:  Negative for hearing loss and trouble swallowing.    Eyes:  Negative for photophobia and visual disturbance.   Respiratory:  Negative for cough, shortness of breath and wheezing.    Cardiovascular:  Negative for chest pain, palpitations and leg swelling.   Gastrointestinal:  Negative for abdominal pain and nausea.   Genitourinary:  Negative for dysuria and frequency.   Musculoskeletal:  Negative for arthralgias, back pain, gait problem, joint swelling, myalgias and neck pain.   Skin:  Positive for color change and wound. Negative for rash.   Neurological:  Positive for numbness. Negative for tremors, seizures, weakness and headaches.   Hematological:  Does not bruise/bleed easily.         Objective:        Physical Exam:   Foot Exam    General  Orientation: alert and oriented to person, place, and time   Affect: appropriate   Gait: unimpaired       Right Foot/Ankle     Inspection and Palpation  Ecchymosis: none  Tenderness: none   Swelling: great toe metatarsophalangeal joint   Arch: pes cavus  Skin Exam: callus and ulcer; no drainage and no erythema   Neurovascular  Dorsalis pedis: 2+  Posterior tibial: 1+  Capillary Refill: 2+  Varicose veins: not present  Saphenous nerve sensation: diminished  Tibial nerve sensation: diminished  Superficial peroneal nerve sensation: diminished  Deep peroneal nerve sensation: diminished  Sural nerve sensation: diminished    Edema  Type of edema: non-pitting    Muscle Strength  Ankle dorsiflexion: 5  Ankle plantar flexion: 5  Ankle inversion: 5  Ankle eversion: 5  Great toe extension: 5  Great toe flexion: 5    Range of Motion    Normal right ankle ROM    Tests  Anterior drawer: negative   Talar tilt: negative   PT Tinel's sign: negative    Paresthesia: negative  Comments  Grade 2 ulcer underlying the 1st  metatarsal head.  Proximally 1 cm x 0.2 cm x 0.1 cm.  Extends to subcutaneous tissue.  Red granular base.    Left Foot/Ankle      Inspection and Palpation  Ecchymosis: none  Tenderness: none   Swelling: none   Arch: pes cavus  Skin Exam: callus; no drainage, no ulcer and no erythema   Neurovascular  Dorsalis pedis: 2+  Posterior tibial: 1+  Capillary refill: 2+  Varicose veins: not present  Saphenous nerve sensation: diminished  Tibial nerve sensation: diminished  Superficial peroneal nerve sensation: diminished  Deep peroneal nerve sensation: diminished  Sural nerve sensation: diminished    Edema  Type of edema: non-pitting    Muscle Strength  Ankle dorsiflexion: 5  Ankle plantar flexion: 5  Ankle inversion: 5  Ankle eversion: 5  Great toe extension: 5  Great toe flexion: 5    Range of Motion    Normal left ankle ROM    Tests  Anterior drawer: negative   Talar tilt: negative   PT Tinel's sign: negative  Paresthesia: negative      Physical Exam  Cardiovascular:      Pulses:           Dorsalis pedis pulses are 2+ on the right side and 2+ on the left side.        Posterior tibial pulses are 1+ on the right side and 1+ on the left side.   Feet:      Right foot:      Skin integrity: Ulcer and callus present. No erythema.      Left foot:      Skin integrity: Callus present. No ulcer or erythema.               Right Ankle/Foot Exam     Swelling   The patient is swollen on the great toe metatarsophalangeal joint.    Range of Motion   The patient has normal right ankle ROM.    Comments:  Grade 2 ulcer underlying the 1st metatarsal head.  Proximally 1 cm x 0.2 cm x 0.1 cm.  Extends to subcutaneous tissue.  Red granular base.    Left Ankle/Foot Exam     Range of Motion   The patient has normal left ankle ROM.       Muscle Strength   Right Lower Extremity   Ankle Dorsiflexion:  5   Plantar flexion:  5/5  Left Lower Extremity   Ankle Dorsiflexion:  5   Plantar flexion:  5/5     Vascular Exam     Right Pulses  Dorsalis Pedis:       2+  Posterior Tibial:      1+        Left Pulses  Dorsalis Pedis:      2+  Posterior Tibial:      1+           Imaging: none            Assessment:       1. Diabetic polyneuropathy associated with type 2 diabetes mellitus    2. Ulcer of foot with fat layer exposed, unspecified laterality    3. Pes cavus of both feet    4. Acquired keratoderma    5. Encounter for diabetic foot exam      Plan:   Diabetic polyneuropathy associated with type 2 diabetes mellitus  -     ORTHOTIC DEVICE (DME)  -      DIABETES FOOT EXAM    Ulcer of foot with fat layer exposed, unspecified laterality    Pes cavus of both feet  -     ORTHOTIC DEVICE (DME)    Acquired keratoderma  -     ORTHOTIC DEVICE (DME)    Encounter for diabetic foot exam  -      DIABETES FOOT EXAM      Follow up if symptoms worsen or fail to improve.    Procedures        I discussed with the patient that does appear that he is receiving good and proper wound care at this time.  Recommend that he continue with this in the home health.  I explained the wound is formed due to excess pressure.  We discussed different ways to limit pressure including proper shoe gear.  We did discuss possibility of diabetic shoes and insoles.  Patient states that in the past these have not provided adequate pressure relief.  Patient states that he has had previous custom orthotics which were more beneficial.  He would like an updated prescription for these.  I am going to give him a prescription and refer him to [facility] for further evaluation and fitting.    Counseling:     I provided patient education verbally regarding:   Patient diagnosis, treatment options, as well as alternatives, risks, and benefits.     This note was created using Dragon voice recognition software that occasionally misinterpreted phrases or words.

## 2024-03-05 ENCOUNTER — TELEPHONE (OUTPATIENT)
Dept: UROLOGY | Facility: CLINIC | Age: 74
End: 2024-03-05
Payer: MEDICARE

## 2024-03-05 NOTE — TELEPHONE ENCOUNTER
Pre appt call   Referral placed for pneumaturia by dr talavera  Pt declines past urology care as per pt he is unaware  Appt confirmed informed will need urine upon arrival   Pt vu

## 2024-03-11 NOTE — H&P (VIEW-ONLY)
[facility] Urology New Patient/H&P:     John Martini is a 73 y.o. male who presents for evaluation of pneumaturia at referral of Dr Lawson    02/29/24 saw Dr Lawson: Presents today with complaints of passing gas through his penis for the last 3 days. Patient has never had this happen before. There is no history of diverticulosis coli prostate disease colitis etc. In fact on further questioning he mentioned that he had a colonoscopy in the last 2 years by Dr. Vides and he was told that he would never have to have another one done. There has been no passing of stool through the penis as of now. referred to gastroenterology and urology.  Regarding his glycemic control is markedly improved as before and is down to 26 units subcutaneously of the Tresiba now. Has not been experiencing the hypoglycemic symptomatology that he was noticing before when we first placed the constant glucose monitor on. quit smoking about 28 years ago after 40 yrs of smoking  Also has PMHx HTN, hypothyroid, DM  1/30/24 HbA1C is 8.3 (down from 8.5 in 10/23)    He presents today noting  Has had sensation where at the end of urinating, feels like there is some air blowing out.  This was happening consistently for a few days prior to primary care evaluation as above.  Now it is about once a day.  Seems to be worse after he is active working outside or getting off of the lawnmower.  Felt like had bladder infection when it first started. Had burning with urination. Next day air started.   Reports has had UTIs before, never saw urologist, no known cultures or treatments. Though did have episode when was hospitalized for dehydration about 2 yrs ago and thinks was then was infected and may or may not have seen urologist    On chart review 5/13/22 to [facility] by ems with weak.dizzy near LOC Labs obtained emergency part include a glucose 197, benign chemistry, lactic acid 3 with a repeat of 1.4, negative cardiac profiles, WBCs 13.1, hemoglobin 12.6,  hematocrit 39.3, UA reveals a glucose of greater than 1000 mg/dL with positive nitrates and 10-15 WBCs and many bacteria with no epithelial cells seen. Rapid COVID test negative. Portable chest x-ray reveals no acute cardiopulmonary findings. Patient was treated emergency department 1 g Maxipime and will be admitted to the MedSur unit.  - admitted and treated for acute cystitis, essential hypertension, type 2 diabetes mellitus, lactic acidosis, GERD, and primary hypothyroidism. Patient was treated with IV fluids, IV antibiotics, and his home medications. Labs have improved. Vital signs are stable. No fever last 24 hours. Pt reports feeling well overall and is improved clinically. Chart reviewed. John Martini is being discharged home today 5/15/2022. Instructions provided for patient to continue home medication regimen with the addition of Augmentin.   - Ua nit+ cloudy 10-15 wbc, many >20 bacteria  >100k cfu klebsiella pneumoniae pansensitive  Then had admit to [facility] 5/20/22 and UA on arrival protein, 4+ gluc, 2+ ketones, 2+ blood - micro with 28rbc, no sig pyuria or bacteria and did not reflex to culture  DCed 5/15 from [place] with generalized weakness before admission, but presented to [facility] with continued with physical deconditioning afterwards, discharged on augmentin, He now has diarrhea by report, T 100.7, nausea  5/21/22 CT on that admit NONcon: Kidneys are normal size. There is bilateral nonspecific perinephric fat stranding. There is no hydronephrosis or nephrolithiasis. Hypoattenuating structure at the inferior pole of the left kidney measuring approximately 2.2 cm in diameter noted. The ureters are normal caliber without obstructions. The bladder is fluid-filled with no focal wall abnormalities. Coarse calcifications of the central prostate gland noted. No free fluid in the pelvis.   - fu MRI w/wo contrast noted Multiple simple left renal cysts, with no enhancing solid renal mass.     Drinks nothing but  grape crystal light sugar free all day - 1 gallon - doesn't like plain water  When he found out that his A1c was still a 8.3 earlier in the year, has been utilizing glucose monitor and changing diet, and feels that has been helping and now based on his average fasting blood sugars the monitor as telling him his A1c is closer to 6.2  Does have some issues with memory  Has had some obstructive lower urinary tract symptoms for a few years, and has been on Flomax 0.4 mg nightly for what he believes to be at least 3 years  No constipation  AUA symptom score:  20/4 (4: Emptying, intermittency, urgency, weak stream; 3: Frequency; 1: Sleeping) Flomax helps 6/10.  Urinalysis dipstick today has greater than a 1000 glucose, trace blood, 30 protein, positive nitrites, trace leukocytes.  PVR is 6 cc by bladder scan  Has had kidney stones before. Many many years. 1995. Quit drinking tea  PSA 0.25 in 2018, and 0.19 in 2021 on chart review  11/29/22 Cr 1, eGFR >60    Personal review of CT scan from 2022 has a 4.8 x 3.6 cm prostate with mild intravesical extension and minimal effacement of the base of the bladder with coarse calcifications as noted in report.    Past Medical History:   Diagnosis Date    Allergy     Diabetes mellitus, type 2     GERD (gastroesophageal reflux disease)     Hyperlipidemia     Hypertension     Hyperthyroidism     Personal history of colonic polyps        Past Surgical History:   Procedure Laterality Date    COLONOSCOPY      GASTRIC BYPASS N/A        Family History   Problem Relation Age of Onset    Hyperlipidemia Mother     Stroke Mother     Heart disease Father        Social History     Socioeconomic History    Marital status:    Tobacco Use    Smoking status: Former    Smokeless tobacco: Never       Review of patient's allergies indicates:   Allergen Reactions    Glipizide Hives       Medications Reviewed: see MAR    Focused Physical Exam    Vitals:    03/12/24 0916   BP: (!) 147/73   Pulse: 61  "    Body mass index is 30.43 kg/m². Weight: 107.5 kg (237 lb) Height: 6' 2" (188 cm)       Abdomen: Soft, non-tender, nondistended, no CVA tenderness    LABS:    Recent Results (from the past 336 hour(s))   POCT Bladder Scan    Collection Time: 03/12/24  9:18 AM   Result Value Ref Range    POC Residual Urine Volume 6 0 - 100 mL   POCT Urinalysis(Instrument)    Collection Time: 03/12/24  9:25 AM   Result Value Ref Range    Color, POC UA Yellow Yellow, Straw, Colorless    Clarity, POC UA Cloudy (A) Clear    Glucose, POC UA >=1000 (A) Negative    Bilirubin, POC UA Negative Negative    Ketones, POC UA Negative Negative    Spec Grav POC UA 1.015 1.005 - 1.030    Blood, POC UA Trace-intact (A) Negative    pH, POC UA 6.0 5.0 - 8.0    Protein, POC UA 30 (A) Negative    Urobilinogen, POC UA 0.2 <=1.0    Nitrite, POC UA Positive (A) Negative    WBC, POC UA Trace (A) Negative         Assessment/Diagnosis:    1. Pneumaturia  POCT Bladder Scan    POCT Urinalysis(Instrument)    Creatinine, Serum    CT Urogram Abd Pelvis W WO      2. Acute cystitis without hematuria  Urine culture    Urinalysis    Urine Culture High Risk    Procedure Order to Urology      3. BPH without obstruction/lower urinary tract symptoms  Procedure Order to Urology    Creatinine, Serum    CT Urogram Abd Pelvis W WO          Plans:  Extensive review of referral provided by primary care in regards to concern for new hematuria, as well as extensive chart and medical record review of prior labs imaging and hospitalizations including for weakness dehydration urinary tract infection in 2022 as summarized above.  The patient does report feeling symptomatic with burning with urination before this symptom complex started, and his urinalysis today appears grossly infected and nitrite positive.  No urine was checked at initial primary care evaluation prior to referral for this concern, and while we did discuss that concern for air in voiding can be suspicious for " fistula which may have been the suspicion of PCP in regards to discussion of evaluation by primary care and GI, he has not seen GI and did have colonoscopy in the last 2 years which was negative.  We did review that urinary tract infections can have gas forming bacteria, and there can be emphysematous cystitis, and this may be responsible for the sensation that he is experiencing especially with a history of urinary tract infection and urine that looks actively infected at this time.    He does have severe obstructive lower urinary tract symptoms which have been poorly managed with alpha-blocker, Flomax 0.4 mg nightly for the last few years, and we did discuss that prostate obstruction with infection is an indication to relieve obstruction, and recurrent urinary tract infection is also an indication for lower urinary tract workup as well as upper urinary tract workup as is micro hematuria, which was still present on his 2nd hospitalization after treatment for UTI, and today is difficult to assess in the setting of active infection with nitrite positive urine.    After extensive review of all of the above natural history of BPH with obstruction, review of CT scan and prior urine cultures with pansensitive Klebsiella, and urinalysis dipstick today consistent with infection, I recommended a 10 day treatment course for complicated UTI, likely chronic for the last few weeks since symptoms started, to clear his acute infection.  Will send a urine culture to verify species and sensitivity.  He will need lower tract evaluation with cystoscopy and prostate ultrasound to help guide further recommendations, with CT urogram prior.  Would want to wait on upper tract imaging until clearance of infection.  As well, as chronic inflammation from infection can yield cystoscopic findings in the bladder, would want to place on a low-dose suppressive antibiotic from clearing his infection until time of cystoscopy and delay this by a few  weeks to allow any inflammation from this infectious episode to clear.    Certainly any fistula or other source of air or perceived aortic gas in the urine is not ruled out, however CT urogram and cystoscopy will also help further assess this.  Certainly if there are any distinct concerns, can get GI involved as well.    Will hold on alpha-blocker increase at this time, as he is emptying with a PVR of 6 cc, and focused on conservative recommendations for urgency and frequency which were provided.      Total time spent in/on encounter today, including face to face time with patient, counseling, medical record review, interpretation of tests/results, , and treatment plan coordination: 90 minutes    *Visit today included increased complexity associated with the current or anticipated ongoing medical longitudinal care and management related to this patient's serious and/or complex managed problem(s) as described above.

## 2024-03-11 NOTE — PROGRESS NOTES
[facility] Urology New Patient/H&P:     John Martini is a 73 y.o. male who presents for evaluation of pneumaturia at referral of Dr Laswon    02/29/24 saw Dr Lawson: Presents today with complaints of passing gas through his penis for the last 3 days. Patient has never had this happen before. There is no history of diverticulosis coli prostate disease colitis etc. In fact on further questioning he mentioned that he had a colonoscopy in the last 2 years by Dr. Vides and he was told that he would never have to have another one done. There has been no passing of stool through the penis as of now. referred to gastroenterology and urology.  Regarding his glycemic control is markedly improved as before and is down to 26 units subcutaneously of the Tresiba now. Has not been experiencing the hypoglycemic symptomatology that he was noticing before when we first placed the constant glucose monitor on. quit smoking about 28 years ago after 40 yrs of smoking  Also has PMHx HTN, hypothyroid, DM  1/30/24 HbA1C is 8.3 (down from 8.5 in 10/23)    He presents today noting  Has had sensation where at the end of urinating, feels like there is some air blowing out.  This was happening consistently for a few days prior to primary care evaluation as above.  Now it is about once a day.  Seems to be worse after he is active working outside or getting off of the lawnmower.  Felt like had bladder infection when it first started. Had burning with urination. Next day air started.   Reports has had UTIs before, never saw urologist, no known cultures or treatments. Though did have episode when was hospitalized for dehydration about 2 yrs ago and thinks was then was infected and may or may not have seen urologist    On chart review 5/13/22 to [facility] by ems with weak.dizzy near LOC Labs obtained emergency part include a glucose 197, benign chemistry, lactic acid 3 with a repeat of 1.4, negative cardiac profiles, WBCs 13.1, hemoglobin 12.6,  hematocrit 39.3, UA reveals a glucose of greater than 1000 mg/dL with positive nitrates and 10-15 WBCs and many bacteria with no epithelial cells seen. Rapid COVID test negative. Portable chest x-ray reveals no acute cardiopulmonary findings. Patient was treated emergency department 1 g Maxipime and will be admitted to the MedSur unit.  - admitted and treated for acute cystitis, essential hypertension, type 2 diabetes mellitus, lactic acidosis, GERD, and primary hypothyroidism. Patient was treated with IV fluids, IV antibiotics, and his home medications. Labs have improved. Vital signs are stable. No fever last 24 hours. Pt reports feeling well overall and is improved clinically. Chart reviewed. John Martini is being discharged home today 5/15/2022. Instructions provided for patient to continue home medication regimen with the addition of Augmentin.   - Ua nit+ cloudy 10-15 wbc, many >20 bacteria  >100k cfu klebsiella pneumoniae pansensitive  Then had admit to [facility] 5/20/22 and UA on arrival protein, 4+ gluc, 2+ ketones, 2+ blood - micro with 28rbc, no sig pyuria or bacteria and did not reflex to culture  DCed 5/15 from [place] with generalized weakness before admission, but presented to [facility] with continued with physical deconditioning afterwards, discharged on augmentin, He now has diarrhea by report, T 100.7, nausea  5/21/22 CT on that admit NONcon: Kidneys are normal size. There is bilateral nonspecific perinephric fat stranding. There is no hydronephrosis or nephrolithiasis. Hypoattenuating structure at the inferior pole of the left kidney measuring approximately 2.2 cm in diameter noted. The ureters are normal caliber without obstructions. The bladder is fluid-filled with no focal wall abnormalities. Coarse calcifications of the central prostate gland noted. No free fluid in the pelvis.   - fu MRI w/wo contrast noted Multiple simple left renal cysts, with no enhancing solid renal mass.     Drinks nothing but  grape crystal light sugar free all day - 1 gallon - doesn't like plain water  When he found out that his A1c was still a 8.3 earlier in the year, has been utilizing glucose monitor and changing diet, and feels that has been helping and now based on his average fasting blood sugars the monitor as telling him his A1c is closer to 6.2  Does have some issues with memory  Has had some obstructive lower urinary tract symptoms for a few years, and has been on Flomax 0.4 mg nightly for what he believes to be at least 3 years  No constipation  AUA symptom score:  20/4 (4: Emptying, intermittency, urgency, weak stream; 3: Frequency; 1: Sleeping) Flomax helps 6/10.  Urinalysis dipstick today has greater than a 1000 glucose, trace blood, 30 protein, positive nitrites, trace leukocytes.  PVR is 6 cc by bladder scan  Has had kidney stones before. Many many years. 1995. Quit drinking tea  PSA 0.25 in 2018, and 0.19 in 2021 on chart review  11/29/22 Cr 1, eGFR >60    Personal review of CT scan from 2022 has a 4.8 x 3.6 cm prostate with mild intravesical extension and minimal effacement of the base of the bladder with coarse calcifications as noted in report.    Past Medical History:   Diagnosis Date    Allergy     Diabetes mellitus, type 2     GERD (gastroesophageal reflux disease)     Hyperlipidemia     Hypertension     Hyperthyroidism     Personal history of colonic polyps        Past Surgical History:   Procedure Laterality Date    COLONOSCOPY      GASTRIC BYPASS N/A        Family History   Problem Relation Age of Onset    Hyperlipidemia Mother     Stroke Mother     Heart disease Father        Social History     Socioeconomic History    Marital status:    Tobacco Use    Smoking status: Former    Smokeless tobacco: Never       Review of patient's allergies indicates:   Allergen Reactions    Glipizide Hives       Medications Reviewed: see MAR    Focused Physical Exam    Vitals:    03/12/24 0916   BP: (!) 147/73   Pulse: 61  "    Body mass index is 30.43 kg/m². Weight: 107.5 kg (237 lb) Height: 6' 2" (188 cm)       Abdomen: Soft, non-tender, nondistended, no CVA tenderness    LABS:    Recent Results (from the past 336 hour(s))   POCT Bladder Scan    Collection Time: 03/12/24  9:18 AM   Result Value Ref Range    POC Residual Urine Volume 6 0 - 100 mL   POCT Urinalysis(Instrument)    Collection Time: 03/12/24  9:25 AM   Result Value Ref Range    Color, POC UA Yellow Yellow, Straw, Colorless    Clarity, POC UA Cloudy (A) Clear    Glucose, POC UA >=1000 (A) Negative    Bilirubin, POC UA Negative Negative    Ketones, POC UA Negative Negative    Spec Grav POC UA 1.015 1.005 - 1.030    Blood, POC UA Trace-intact (A) Negative    pH, POC UA 6.0 5.0 - 8.0    Protein, POC UA 30 (A) Negative    Urobilinogen, POC UA 0.2 <=1.0    Nitrite, POC UA Positive (A) Negative    WBC, POC UA Trace (A) Negative         Assessment/Diagnosis:    1. Pneumaturia  POCT Bladder Scan    POCT Urinalysis(Instrument)    Creatinine, Serum    CT Urogram Abd Pelvis W WO      2. Acute cystitis without hematuria  Urine culture    Urinalysis    Urine Culture High Risk    Procedure Order to Urology      3. BPH without obstruction/lower urinary tract symptoms  Procedure Order to Urology    Creatinine, Serum    CT Urogram Abd Pelvis W WO          Plans:  Extensive review of referral provided by primary care in regards to concern for new hematuria, as well as extensive chart and medical record review of prior labs imaging and hospitalizations including for weakness dehydration urinary tract infection in 2022 as summarized above.  The patient does report feeling symptomatic with burning with urination before this symptom complex started, and his urinalysis today appears grossly infected and nitrite positive.  No urine was checked at initial primary care evaluation prior to referral for this concern, and while we did discuss that concern for air in voiding can be suspicious for " fistula which may have been the suspicion of PCP in regards to discussion of evaluation by primary care and GI, he has not seen GI and did have colonoscopy in the last 2 years which was negative.  We did review that urinary tract infections can have gas forming bacteria, and there can be emphysematous cystitis, and this may be responsible for the sensation that he is experiencing especially with a history of urinary tract infection and urine that looks actively infected at this time.    He does have severe obstructive lower urinary tract symptoms which have been poorly managed with alpha-blocker, Flomax 0.4 mg nightly for the last few years, and we did discuss that prostate obstruction with infection is an indication to relieve obstruction, and recurrent urinary tract infection is also an indication for lower urinary tract workup as well as upper urinary tract workup as is micro hematuria, which was still present on his 2nd hospitalization after treatment for UTI, and today is difficult to assess in the setting of active infection with nitrite positive urine.    After extensive review of all of the above natural history of BPH with obstruction, review of CT scan and prior urine cultures with pansensitive Klebsiella, and urinalysis dipstick today consistent with infection, I recommended a 10 day treatment course for complicated UTI, likely chronic for the last few weeks since symptoms started, to clear his acute infection.  Will send a urine culture to verify species and sensitivity.  He will need lower tract evaluation with cystoscopy and prostate ultrasound to help guide further recommendations, with CT urogram prior.  Would want to wait on upper tract imaging until clearance of infection.  As well, as chronic inflammation from infection can yield cystoscopic findings in the bladder, would want to place on a low-dose suppressive antibiotic from clearing his infection until time of cystoscopy and delay this by a few  weeks to allow any inflammation from this infectious episode to clear.    Certainly any fistula or other source of air or perceived aortic gas in the urine is not ruled out, however CT urogram and cystoscopy will also help further assess this.  Certainly if there are any distinct concerns, can get GI involved as well.    Will hold on alpha-blocker increase at this time, as he is emptying with a PVR of 6 cc, and focused on conservative recommendations for urgency and frequency which were provided.      Total time spent in/on encounter today, including face to face time with patient, counseling, medical record review, interpretation of tests/results, , and treatment plan coordination: 90 minutes    *Visit today included increased complexity associated with the current or anticipated ongoing medical longitudinal care and management related to this patient's serious and/or complex managed problem(s) as described above.

## 2024-03-12 ENCOUNTER — OFFICE VISIT (OUTPATIENT)
Dept: UROLOGY | Facility: CLINIC | Age: 74
End: 2024-03-12
Payer: MEDICARE

## 2024-03-12 VITALS
DIASTOLIC BLOOD PRESSURE: 73 MMHG | WEIGHT: 237 LBS | SYSTOLIC BLOOD PRESSURE: 147 MMHG | HEART RATE: 61 BPM | BODY MASS INDEX: 30.42 KG/M2 | HEIGHT: 74 IN

## 2024-03-12 DIAGNOSIS — N30.00 ACUTE CYSTITIS WITHOUT HEMATURIA: ICD-10-CM

## 2024-03-12 DIAGNOSIS — N40.0 BPH WITHOUT OBSTRUCTION/LOWER URINARY TRACT SYMPTOMS: Primary | ICD-10-CM

## 2024-03-12 DIAGNOSIS — R39.89 PNEUMATURIA: Primary | ICD-10-CM

## 2024-03-12 DIAGNOSIS — N40.0 BPH WITHOUT OBSTRUCTION/LOWER URINARY TRACT SYMPTOMS: ICD-10-CM

## 2024-03-12 LAB
BILIRUBIN, UA POC OHS: NEGATIVE
BLOOD, UA POC OHS: ABNORMAL
CLARITY, UA POC OHS: ABNORMAL
COLOR, UA POC OHS: YELLOW
GLUCOSE, UA POC OHS: >=1000
KETONES, UA POC OHS: NEGATIVE
LEUKOCYTES, UA POC OHS: ABNORMAL
NITRITE, UA POC OHS: POSITIVE
PH, UA POC OHS: 6
POC RESIDUAL URINE VOLUME: 6 ML (ref 0–100)
PROTEIN, UA POC OHS: 30
SPECIFIC GRAVITY, UA POC OHS: 1.01
UROBILINOGEN, UA POC OHS: 0.2

## 2024-03-12 PROCEDURE — 87086 URINE CULTURE/COLONY COUNT: CPT | Performed by: UROLOGY

## 2024-03-12 PROCEDURE — 1159F MED LIST DOCD IN RCRD: CPT | Mod: CPTII,S$GLB,, | Performed by: UROLOGY

## 2024-03-12 PROCEDURE — 99205 OFFICE O/P NEW HI 60 MIN: CPT | Mod: 25,S$GLB,, | Performed by: UROLOGY

## 2024-03-12 PROCEDURE — 3077F SYST BP >= 140 MM HG: CPT | Mod: CPTII,S$GLB,, | Performed by: UROLOGY

## 2024-03-12 PROCEDURE — 87186 SC STD MICRODIL/AGAR DIL: CPT | Performed by: UROLOGY

## 2024-03-12 PROCEDURE — 3078F DIAST BP <80 MM HG: CPT | Mod: CPTII,S$GLB,, | Performed by: UROLOGY

## 2024-03-12 PROCEDURE — 87088 URINE BACTERIA CULTURE: CPT | Performed by: UROLOGY

## 2024-03-12 PROCEDURE — 1101F PT FALLS ASSESS-DOCD LE1/YR: CPT | Mod: CPTII,S$GLB,, | Performed by: UROLOGY

## 2024-03-12 PROCEDURE — 87077 CULTURE AEROBIC IDENTIFY: CPT | Performed by: UROLOGY

## 2024-03-12 PROCEDURE — 1126F AMNT PAIN NOTED NONE PRSNT: CPT | Mod: CPTII,S$GLB,, | Performed by: UROLOGY

## 2024-03-12 PROCEDURE — 3288F FALL RISK ASSESSMENT DOCD: CPT | Mod: CPTII,S$GLB,, | Performed by: UROLOGY

## 2024-03-12 PROCEDURE — 4010F ACE/ARB THERAPY RXD/TAKEN: CPT | Mod: CPTII,S$GLB,, | Performed by: UROLOGY

## 2024-03-12 PROCEDURE — 3008F BODY MASS INDEX DOCD: CPT | Mod: CPTII,S$GLB,, | Performed by: UROLOGY

## 2024-03-12 PROCEDURE — 51798 US URINE CAPACITY MEASURE: CPT | Mod: S$GLB,,, | Performed by: UROLOGY

## 2024-03-12 PROCEDURE — 99417 PROLNG OP E/M EACH 15 MIN: CPT | Mod: S$GLB,,, | Performed by: UROLOGY

## 2024-03-12 PROCEDURE — 81003 URINALYSIS AUTO W/O SCOPE: CPT | Mod: QW,S$GLB,, | Performed by: UROLOGY

## 2024-03-12 PROCEDURE — 99999 PR PBB SHADOW E&M-EST. PATIENT-LVL IV: CPT | Mod: PBBFAC,,, | Performed by: UROLOGY

## 2024-03-12 RX ORDER — REPAGLINIDE 2 MG/1
TABLET ORAL
COMMUNITY
Start: 2024-01-24

## 2024-03-12 RX ORDER — SULFAMETHOXAZOLE AND TRIMETHOPRIM 400; 80 MG/1; MG/1
1 TABLET ORAL DAILY
Qty: 30 TABLET | Refills: 0 | Status: ON HOLD | OUTPATIENT
Start: 2024-03-22 | End: 2024-04-09

## 2024-03-12 RX ORDER — SULFAMETHOXAZOLE AND TRIMETHOPRIM 800; 160 MG/1; MG/1
1 TABLET ORAL 2 TIMES DAILY
Qty: 20 TABLET | Refills: 0 | Status: SHIPPED | OUTPATIENT
Start: 2024-03-12 | End: 2024-03-22

## 2024-03-12 RX ORDER — SULFAMETHOXAZOLE AND TRIMETHOPRIM 800; 160 MG/1; MG/1
1 TABLET ORAL 2 TIMES DAILY
Qty: 14 TABLET | Refills: 0 | Status: SHIPPED | OUTPATIENT
Start: 2024-03-12 | End: 2024-03-12

## 2024-03-12 NOTE — PROGRESS NOTES
Procedure Order to Urology [[id number]]    Electronically signed by: Alfonso Rivas MD on 03/12/24 0959 Status: Active   Ordering user: Alfonso Rivas MD 03/12/24 0959 Authorized by: Alfonso Rivas MD   Ordering mode: Standard   Frequency:  03/12/24 -     Diagnoses  Acute cystitis without hematuria [N30.00]  BPH without obstruction/lower urinary tract symptoms [N40.0]   Questionnaire    Question Answer   Procedure Cystoscopy Comment - 4/9  TRUS/Trans Rectal Ultrasound   Facility Name: [facility], Please order Urine POCT without micro . Local sedation/trus

## 2024-03-12 NOTE — PATIENT INSTRUCTIONS
Discussed conservative measures to control urgency and frequency including   1. Avoiding/minimizing bladder irritants (see below), especially in afternoon and evening hours    Discussed bladder irritants include coffe (even decaf), tea, alcohol, soda, spicy foods, acidic juices (orange, tomato), vinegar, and artificial sweeteners/sugary beverages.    2. timed voiding - empty on a schedule (approx ~2-3 hours) in spite of need to urinate, to get ahead of urge    3. dont postponing voiding - dont hold it on purpose     4. bowel regimen as distended bowel has extrinsic compressive effect on bladder.   - any or all of the following in any combination, titrate to soft daily bowel movement without pushing or straining  - colace/stool softener capsule - once to twice daily  - miralax - 1 capful daily to start, can increase to 2x daily (or decrease to 1/2 cap daily)  - increase dietary fibery  - fiber supplements, such as metamucil  - prunes, prune juice    5. INCREASE water intake    6. Stop fluids 2 hours before bed, and urinate just before bed    7. Today start Bactrim 800 twice daily for 10 days.  On day 11 start 400 daily    8. Cystoscopy (camera and bladder, and prostate ultrasound, at the [facility] in the afternoon after lunch time on Tuesday 4/9/24.  Will get call Friday prior with afternoon arrival time.

## 2024-03-14 LAB — BACTERIA UR CULT: ABNORMAL

## 2024-03-28 ENCOUNTER — HOSPITAL ENCOUNTER (OUTPATIENT)
Dept: RADIOLOGY | Facility: HOSPITAL | Age: 74
Discharge: HOME | End: 2024-03-28
Attending: UROLOGY
Payer: MEDICARE

## 2024-03-28 DIAGNOSIS — N40.0 BPH WITHOUT OBSTRUCTION/LOWER URINARY TRACT SYMPTOMS: ICD-10-CM

## 2024-03-28 DIAGNOSIS — R39.89 PNEUMATURIA: ICD-10-CM

## 2024-03-28 PROCEDURE — 74178 CT ABD&PLV WO CNTR FLWD CNTR: CPT | Mod: 26,,, | Performed by: RADIOLOGY

## 2024-03-28 PROCEDURE — 25500020 PHARM REV CODE 255

## 2024-03-28 PROCEDURE — 74178 CT ABD&PLV WO CNTR FLWD CNTR: CPT | Mod: TC

## 2024-03-28 RX ADMIN — IOHEXOL 125 ML: 350 INJECTION, SOLUTION INTRAVENOUS at 03:03

## 2024-04-08 NOTE — DISCHARGE INSTRUCTIONS
Transrectal Ultrasound   A transrectal ultrasound is an imaging test. It uses sound waves to create pictures of a mans prostate gland to determine its size.Your prostate gland is in front of your rectum and if too large may become inflamed and impede the flow of urine. For this test, a special probe (transducer) is placed directly into your rectum.     Before leaving, you may need to wait for a short time while the images are reviewed. In most cases, you can go back to your normal routine after the test.            After the procedure    Drink plenty of fluids.  You may have burning or light bleeding when you urinate--this is normal.  Medications may be prescribed to ease any discomfort or prevent infection. Take these as directed.  Call your doctor if you have heavy bleeding or blood clots, burning that lasts more than a day, a fever over 100°F  (38° C), or trouble urinating.    After Surgery:  Always be aware that any surgery can cause these symptoms:    Pain- Medication can be prescribed for pain to decrease your pain but may not completely take your pain away.  Over the Counter pain medicine my be enough and you can always use Ice and rest to help ease pain.    Bleeding- a little bleeding after a surgery is usually within normal.  If there is a lot of blood you need to notify your MD.  Emergency treatments of bleeding are cold application, elevation of the bleeding site and compression.    Infection- Infection after surgery is NOT a normal occurrence.  Signs of infection are fever, swelling, hot to touch the incision.  If this occurs notify your MD immediately.    Nausea- this can be common after a surgery especially if you have had anesthesia medicine or are taking pain medicine.  Staying on clear liquids, bland foods, gingerale, or over the counter anti nausea medicines can help.  If you vomit more than once, notify your MD.  Anti Nausea medicines can be prescribed.

## 2024-04-09 ENCOUNTER — HOSPITAL ENCOUNTER (OUTPATIENT)
Facility: HOSPITAL | Age: 74
Discharge: HOME | End: 2024-04-09
Attending: UROLOGY | Admitting: UROLOGY
Payer: MEDICARE

## 2024-04-09 DIAGNOSIS — N40.0 BPH WITHOUT OBSTRUCTION/LOWER URINARY TRACT SYMPTOMS: ICD-10-CM

## 2024-04-09 DIAGNOSIS — N40.0 BPH WITHOUT OBSTRUCTION/LOWER URINARY TRACT SYMPTOMS: Primary | ICD-10-CM

## 2024-04-09 LAB
BILIRUBIN, UA POC OHS: NEGATIVE
BLOOD, UA POC OHS: NEGATIVE
CLARITY, UA POC OHS: CLEAR
COLOR, UA POC OHS: YELLOW
GLUCOSE, UA POC OHS: 500
KETONES, UA POC OHS: NEGATIVE
LEUKOCYTES, UA POC OHS: NEGATIVE
NITRITE, UA POC OHS: NEGATIVE
PH, UA POC OHS: 6
PROTEIN, UA POC OHS: NEGATIVE
SPECIFIC GRAVITY, UA POC OHS: >=1.03
UROBILINOGEN, UA POC OHS: 0.2

## 2024-04-09 PROCEDURE — 52000 CYSTOURETHROSCOPY: CPT | Mod: ,,, | Performed by: UROLOGY

## 2024-04-09 PROCEDURE — A4217 STERILE WATER/SALINE, 500 ML: HCPCS | Performed by: UROLOGY

## 2024-04-09 PROCEDURE — 76872 US TRANSRECTAL: CPT | Performed by: UROLOGY

## 2024-04-09 PROCEDURE — 76872 US TRANSRECTAL: CPT | Mod: 26,,, | Performed by: UROLOGY

## 2024-04-09 PROCEDURE — 52000 CYSTOURETHROSCOPY: CPT | Performed by: UROLOGY

## 2024-04-09 PROCEDURE — 25000003 PHARM REV CODE 250: Performed by: UROLOGY

## 2024-04-09 RX ORDER — WATER 1 ML/ML
IRRIGANT IRRIGATION
Status: DISCONTINUED | OUTPATIENT
Start: 2024-04-09 | End: 2024-04-09 | Stop reason: HOSPADM

## 2024-04-09 RX ORDER — LIDOCAINE HYDROCHLORIDE 20 MG/ML
JELLY TOPICAL
Status: DISCONTINUED | OUTPATIENT
Start: 2024-04-09 | End: 2024-04-09 | Stop reason: HOSPADM

## 2024-04-09 RX ORDER — SULFAMETHOXAZOLE AND TRIMETHOPRIM 400; 80 MG/1; MG/1
1 TABLET ORAL DAILY
Qty: 30 TABLET | Refills: 1 | Status: SHIPPED | OUTPATIENT
Start: 2024-04-09 | End: 2024-06-06

## 2024-04-09 RX ORDER — CEFAZOLIN SODIUM 2 G/50ML
2 SOLUTION INTRAVENOUS
Status: CANCELLED | OUTPATIENT
Start: 2024-04-09

## 2024-04-09 NOTE — PROGRESS NOTES
Procedure Order to Urology [[id number]]    Electronically signed by: Alfonso Rivas MD on 04/09/24 1505 Status: Active   Ordering user: Alfonso Rivas MD 04/09/24 1505 Ordering provider: Alfonso Rivas MD   Authorized by: Alfonso Rivas MD Ordering mode: Standard   Frequency:  04/09/24 -     Acknowledged: Erin Kirkpatrick RN 04/09/24 1513 for Placing Order   Diagnoses  BPH without obstruction/lower urinary tract symptoms [N40.0]   Questionnaire    Question Answer   Procedure Urolift Comment - 5/9   Facility Name: [facility], please order, CBC, BMP, PT/ INR ,U/A and culture ,EKG if over 40  IV start, NPO, general anesthesia, Ancef 2 gram ( alternative for pcn allergy is Cipro 400mg IV ) [id number] and [id number]

## 2024-04-09 NOTE — INTERVAL H&P NOTE
The patient has been examined and the H&P has been reviewed:    Ucx >100k klebsiella  Can confirm UTI was still present. Likely reason for his symptoms  So complete full dose bactrim 800 2x/day, then start 400 daily suppression next day and follow up as scheduled for repeat imaging, urine, procedures etc.    Can advise repeat urine negative  Ct with no concerning upper tract findings  Calcification in prostate/bladder will be evaluated with cysto/prostate us as planned on 4/9  Continue low dose daily abx and f/u as planned for procedures      There are no hospital problems to display for this patient.

## 2024-04-10 VITALS
BODY MASS INDEX: 30.42 KG/M2 | RESPIRATION RATE: 18 BRPM | HEART RATE: 55 BPM | OXYGEN SATURATION: 99 % | TEMPERATURE: 98 F | DIASTOLIC BLOOD PRESSURE: 76 MMHG | WEIGHT: 237 LBS | SYSTOLIC BLOOD PRESSURE: 165 MMHG | HEIGHT: 74 IN

## 2024-04-10 NOTE — OP NOTE
[facility] Urology Operative/Brief Discharge Note     Date: 4/9/24     Staff Surgeon: Alfonso Rivas MD     Pre-Op Diagnosis:   BPH with LUTS  History of UTI     Post-Op Diagnosis:   BPH with LUTS  History of UTI  Lichen sclerosis urethra  Cystitis cystica     Procedure(s) Performed:   Cystoscopy, flexible  Transrectal ultrasound with volumetric measurement of prostate      Specimen(s): none     Anesthesia: local, 2% xylocaine jelly urojet     Findings:   TRUS volume: 26.96cm3 (W:  38.82 mm, H:  27.03 mm, L:  49.13 mm), no ELLIS, mild PVR  Cysto:     Mild concentric narrowing of anterior urethra and tissue blanching consistent with lichen sclerosis, but able to pass through concentric rings without any resistance, 1 more prominent but wide caliber mid bulbar urethra, and 1 a bit more narrow appearing near level of sphincter, but easily passable with cystoscope without resistance.     Prostatic lateral lobe obstruction with mild elevation of bladder neck, and more anterolateral obstruction proximally.    Bladder diffusely trabeculated, with punctate cystitis cystica lesions diffusely across bladder mucosa, and on impression of prostate at bladder base on retroflexion, with no median lobe or intravesical extension.      Estimated Blood Loss: none     Drains: none     Complications: none     Indications for procedure:  75 yo M with long hx BPH/LUTS, recently   Referred for concerns of pneumaturia, but on further evaluation and discussion, suspected untreated urinary tract infection, especially given his history of urosepsis admission 2 years ago, and urine culture did confirm Klebsiella urinary tract infection for which he treated culture specific antibiotics, and has been on low-dose daily suppressive Bactrim pending further evaluation, with repeat negative culture and CT urogram without concerns.  Symptoms, including no hematuria, did resolve.  He is remained on Flomax or alpha-blocker, which he was on prior to the most  recent infection as well    Procedure in detail:  After informed consent, the patient was placed in left lateral decubitus position. Transrectal ultrasound probe was passed into the rectum and the prostate was visualized on the screen.  Three-dimensional measurements taken as above with reported prostate volume as documented.  Pertinent ultrasound findings noted above, with absence of median lobe, and No ultrasonic abnormalities of prostate were visualized. Transverse and saggital image captured.  The ultrasound probe was removed     He was then placed in supine position and prepped and drapped in standard cystoscopic fashion and 2% xylocaine jelly was instilled into the urethra.  A flexible cystoscope was passed into the bladder via the urethra.      Anterior urethra  with abnormal findings as noted above, but scope able to pass without resistance. The prostatic urethra demonstrated significant obstruction as described above in findings, with lateral lobe obstruction present with the absence of median lobe, as confirmed on retroflexion      Bladder otherwise systematically inspected and no mucosal lesions or tumors seen  despite classic cystitis cystica.  Bladder was also assessed for signs of chronic obstruction such as trabeculations, cellules, diverticulum, of which pertinent findings are noted above with extensive signs of chronic obstruction.  Bilateral ureteral orifices seen in orthotopic position on trigone bilaterally with clear efflux.       Patient tolerated the procedure well. No complications     Disposition:  He does have severe obstructive lower urinary tract symptoms ( AUA symptom score 20/4 including 4-5 emptying, intermittency, urgency weak stream) despite alpha blockers,   has had recurrent urinary tract infection despite alpha blockers, including a history of urosepsis.  We did discuss that in the setting of obstruction and infection, there has an indication to relieve the obstruction.  Certainly  if this was his 1st infection, and he had never been on alpha blockers, and cleared it was felt to be doing well, would be reasonable to follow and only intervene in further infection, however given his history, recent presentation, and cystoscopic findings, now with recurrent urinary tract infections and complications of them in the past with visualize obstruction, recommended relief of obstruction. Discussed options for intervention such as urolift, rezum, tuip, turp. He is interested in minimally invasive intervention with minimal destruction of prostate tissue to minimize side effects   Of treatment.  As well, in discussing any transurethral procedure, including TURIP given small size of gland,  we did review [place] size of scopes, noting that significant [place]an size of resectoscope places increase risk to urethra given the lichen sclerosis and narrowed segments unlikely early urethral stricture.  UroLift scope is also the narrowest and smallest, with shortness procedure time unless manipulation to decrease risk to his urethra of further urethral stricture scarring going forward.  When balancing all of the risks to him, especially given his urethral findings, he elected to proceed with UroLift  for relief of obstruction.     Procedure in detail discussed. Discussed risks including but not limited to hematuria, postop retention, pain, infection, injury to bladder or urethra, and worsening urgency, latent pelvic pain, no guarantee of symptomatic relief, prostate regrowth, need for future procedures. We did discuss the non-incidence of ejaculatory dysfunction, as well as the 13% recurrence rate at 5 years. Also discussed about 20% of people may need a catheter after (due to retention or hematuria) but not required if voiding postop, though tend to leave mortensen prophylactically overnight, which he is agreeable to.  Perhaps longer if any formal stricture dilation is needed  - Certainly we did review that in the  setting of his lichen sclerosis and concentric urethral narrowings, as long as the 20 [place] UroLift scope was able to pass without resistance or formal manipulation to the urethra, routine postop protocols as below, however we discussed the possibility of urethral dilation, in which case [place] catheter would need to be left indwelling a bit longer.     Discussed that symptomatic relief may take longer to be fully appreciated, in the setting of longer standing obstruction, and period of symptomatic adjustment postop. Also discussed transient increases in urgency frequency which may yield transient UUI in postop period.  -   As well recommended, given the noted urgency frequency increases after relief of longstanding outlet obstruction, his cystitis cystica and bladder irritation can be suppressed a bit more before proceeding as this may help facilitate recovery.  He has started a suppressive course of antibiotic therapy already and has been on it for a few weeks and typically we recommend approximately 90 day course to suppress cystitis cystica, and therefore recommended intervention around the shelter point so there is more time of suppressive antibiotic therapy, and he can continue it in the postoperative period.  -  if these lesions are still present in the bladder at time of BPH intervention, even though classic appearance of cystitis cystica, can biopsy for confirmation     All questions patient had answered and appropriate informed consent obtained.  Urolift (possible dilation, poss bladder bx) in OR 5/9/24  - continue low dose daily abx and flomax pending this       Discharge home today status post uncomplicated procedure as above  Diet - resume home diet  Follow up: urolift 5/9/24  Instructions:  Drink plenty of water, may see blood in urine, complete prophylactic antibiotics.  Hold asa/fish oil/bloodthinners 1 week prior to urolift.  Meds:     Medication List        CONTINUE taking these medications   "    ALLEGRA ALLERGY 180 MG tablet  Generic drug: fexofenadine     amLODIPine 5 MG tablet  Commonly known as: NORVASC  TAKE 1 TABLET DAILY     atorvastatin 40 MG tablet  Commonly known as: LIPITOR  TAKE 1 TABLET AT BEDTIME     blood sugar diagnostic Strp  Commonly known as: ONETOUCH VERIO TEST STRIPS  USE 1 STRIP VIA METER TWICE A DAY AS DIRECTED     gabapentin 300 MG capsule  Commonly known as: NEURONTIN  TAKE 1 CAPSULE IN THE MORNING AND 2 CAPSULES AT BEDTIME     GLYXAMBI 25-5 mg Tab  Generic drug: empagliflozin-linagliptin  TAKE 1 TABLET EVERY MORNING     insulin degludec 200 unit/mL (3 mL) insulin pen  Commonly known as: TRESIBA FLEXTOUCH U-200  Inject 40 units subcutaneously every night     irbesartan 150 MG tablet  Commonly known as: AVAPRO  TAKE 1 TABLET DAILY     * levothyroxine 137 MCG Tab tablet  Commonly known as: SYNTHROID  TAKE 2 TABLETS EVERY SUNDAY ON AN EMPTY STOMACH     * levothyroxine 50 MCG tablet  Commonly known as: SYNTHROID  TAKE 1 TABLET EVERY MORNING ONE HOUR BEFORE A MEAL AND PRIOR TO OTHER MEDICATIONS     loratadine 10 mg tablet  Commonly known as: CLARITIN     metFORMIN 500 MG ER 24hr tablet  Commonly known as: GLUCOPHAGE-XR  TAKE 3 TABLETS EVERY EVENING     MULTIPLE VITAMINS DAILY ORAL     pantoprazole 40 MG tablet  Commonly known as: PROTONIX  TAKE 1 TABLET DAILY     pen needle, diabetic 31 gauge x 5/16" Ndle  Commonly known as: BD ULTRA-FINE SHORT PEN NEEDLE  Use to administer Tresiba every night     * repaglinide 1 MG tablet  Commonly known as: PRANDIN  Take 1 tablet (1 mg total) by mouth 3 (three) times daily before meals.     * repaglinide 2 MG tablet  Commonly known as: PRANDIN     sulfamethoxazole-trimethoprim 400-80mg 400-80 mg per tablet  Commonly known as: BACTRIM,SEPTRA  Take 1 tablet by mouth once daily.     tamsulosin 0.4 mg Cap  Commonly known as: FLOMAX  TAKE 1 CAPSULE AT BEDTIME           * This list has 4 medication(s) that are the same as other medications prescribed for " you. Read the directions carefully, and ask your doctor or other care provider to review them with you.                   Where to Get Your Medications        These medications were sent to [facility]/pharmacy #[id number] - [place], [place] - [address] AT [facility]  [address], [place] [address]      Phone: [phone]   sulfamethoxazole-trimethoprim 400-80mg 400-80 mg per tablet

## 2024-04-30 ENCOUNTER — HOSPITAL ENCOUNTER (OUTPATIENT)
Dept: PREADMISSION TESTING | Facility: HOSPITAL | Age: 74
Discharge: HOME | End: 2024-04-30
Attending: UROLOGY
Payer: MEDICARE

## 2024-04-30 DIAGNOSIS — N40.0 BPH WITHOUT OBSTRUCTION/LOWER URINARY TRACT SYMPTOMS: ICD-10-CM

## 2024-04-30 DIAGNOSIS — Z01.810 PREOP CARDIOVASCULAR EXAM: ICD-10-CM

## 2024-04-30 DIAGNOSIS — Z01.818 PREOP TESTING: Primary | ICD-10-CM

## 2024-04-30 LAB
OHS QRS DURATION: 110 MS
OHS QTC CALCULATION: 449 MS

## 2024-04-30 PROCEDURE — 93010 ELECTROCARDIOGRAM REPORT: CPT | Mod: ,,, | Performed by: GENERAL PRACTICE

## 2024-04-30 PROCEDURE — 93005 ELECTROCARDIOGRAM TRACING: CPT

## 2024-04-30 NOTE — DISCHARGE INSTRUCTIONS
To confirm, Your doctor has instructed you that surgery is scheduled for: 5/9/24    Please report to [facility], Registration the morning of surgery. You must check-in and receive a wristband before going to your procedure.  [address] [place] [address] [place] [address]    Pre-Op will call the afternoon prior to surgery between 1:00 and 6:00 PM with the final arrival time.  Phone number: [phone]    PLEASE NOTE:  The surgery schedule has many variables which may affect the time of your surgery case.  Family members should be available if your surgery time changes.  Plan to be here the day of your procedure between 4-6 hours.    MEDICATIONS:  TAKE ONLY THESE MEDICATIONS WITH A SMALL SIP OF WATER THE MORNING OF YOUR PROCEDURE:    SEE MED LIST          DO NOT TAKE THESE MEDICATIONS 5-7 DAYS PRIOR to your procedure or per your surgeon's request:   ASPIRIN, ALEVE, ADVIL, IBUPROFEN, FISH OIL VITAMIN E, HERBALS  (May take Tylenol)    ONLY if you are prescribed any types of blood thinners such as:  Aspirin, Coumadin, Plavix, Pradaxa, Xarelto, Aggrenox, Effient, Eliquis, Savasya, Brilinta, or any other, ask your surgeon whether you should stop taking them and how long before surgery you should stop.  You may also need to verify with the prescribing physician if it is ok to stop your medication.      INSTRUCTIONS IMPORTANT!!  Do not eat or drink anything between midnight and the time of your procedure- this includes gum, mints, and candy.  Do not smoke or drink alcoholic beverages 24 hours prior to your procedure.  Shower the night before AND the morning of your procedure with a Chlorhexidine wash such as Hibiclens or Dial antibacterial soap from the neck down.  Do not get it on your face or in your eyes.  You may use your own shampoo and face wash. This helps your skin to be as bacteria free as possible.    If you wear contact lenses, dentures, hearing aids or glasses, bring a container to put them in  during surgery and give to a family member for safe keeping.  Please leave all jewelry, piercing's and valuables at home. You must remove your false eyelashes prior to surgery.    DO NOT remove hair from the surgery site.  Do not shave the incision site unless you are given specific instructions to do so.    ONLY if you have been diagnosed with sleep apnea please bring your C-PAP machine.  ONLY if you wear home oxygen please bring your portable oxygen tank the day of your procedure.  ONLY if you have a history of OPEN HEART SURGERY you will need a clearance from your Cardiologist per Anesthesia.      ONLY for patients requiring bowel prep, written instructions will be given by your doctor's office.  ONLY if you have a neuro stimulator, please bring the controller with you the morning of surgery  ONLY if a type and screen test is needed before surgery, please return:  If your doctor has scheduled you for an overnight stay, bring a small overnight bag with any personal items you need.  Make arrangements in advance for transportation home by a responsible adult. You can not go home in an uber or a cab per hospital policy.  It is not safe to drive a vehicle during the 24 hours after anesthesia.          All  facilities and properties are tobacco free.  Smoking is NOT allowed.   If you have any questions about these instructions, call Pre-Op Admit  Nursing at [phone] or the Pre-Op Day Surgery Unit at [phone].

## 2024-05-08 ENCOUNTER — ANESTHESIA EVENT (OUTPATIENT)
Dept: SURGERY | Facility: HOSPITAL | Age: 74
End: 2024-05-08
Payer: MEDICARE

## 2024-05-08 RX ORDER — SODIUM CHLORIDE, SODIUM LACTATE, POTASSIUM CHLORIDE, CALCIUM CHLORIDE 600; 310; 30; 20 MG/100ML; MG/100ML; MG/100ML; MG/100ML
INJECTION, SOLUTION INTRAVENOUS CONTINUOUS
Status: CANCELLED | OUTPATIENT
Start: 2024-05-08

## 2024-05-09 ENCOUNTER — ANESTHESIA (OUTPATIENT)
Dept: SURGERY | Facility: HOSPITAL | Age: 74
End: 2024-05-09
Payer: MEDICARE

## 2024-05-09 ENCOUNTER — HOSPITAL ENCOUNTER (OUTPATIENT)
Facility: HOSPITAL | Age: 74
Discharge: HOME | End: 2024-05-09
Attending: UROLOGY | Admitting: UROLOGY
Payer: MEDICARE

## 2024-05-09 DIAGNOSIS — N40.0 BPH WITHOUT OBSTRUCTION/LOWER URINARY TRACT SYMPTOMS: ICD-10-CM

## 2024-05-09 DIAGNOSIS — Z01.818 PREOP TESTING: ICD-10-CM

## 2024-05-09 LAB — POCT GLUCOSE: 131 MG/DL (ref 70–110)

## 2024-05-09 PROCEDURE — D9220A PRA ANESTHESIA: Mod: CRNA,,, | Performed by: NURSE ANESTHETIST, CERTIFIED REGISTERED

## 2024-05-09 PROCEDURE — 36000706: Performed by: UROLOGY

## 2024-05-09 PROCEDURE — D9220A PRA ANESTHESIA: Mod: ANES,,, | Performed by: ANESTHESIOLOGY

## 2024-05-09 PROCEDURE — 37000009 HC ANESTHESIA EA ADD 15 MINS: Performed by: UROLOGY

## 2024-05-09 PROCEDURE — 71000033 HC RECOVERY, INTIAL HOUR: Performed by: UROLOGY

## 2024-05-09 PROCEDURE — L8699 PROSTHETIC IMPLANT NOS: HCPCS | Performed by: UROLOGY

## 2024-05-09 PROCEDURE — 37000008 HC ANESTHESIA 1ST 15 MINUTES: Performed by: UROLOGY

## 2024-05-09 PROCEDURE — 63600175 PHARM REV CODE 636 W HCPCS: Performed by: NURSE ANESTHETIST, CERTIFIED REGISTERED

## 2024-05-09 PROCEDURE — 25000003 PHARM REV CODE 250: Performed by: UROLOGY

## 2024-05-09 PROCEDURE — 52442 CYSTO INS TRNSPRSTC IMPLT EA: CPT | Mod: ,,, | Performed by: UROLOGY

## 2024-05-09 PROCEDURE — 25000003 PHARM REV CODE 250: Performed by: NURSE ANESTHETIST, CERTIFIED REGISTERED

## 2024-05-09 PROCEDURE — 71000015 HC POSTOP RECOV 1ST HR: Performed by: UROLOGY

## 2024-05-09 PROCEDURE — 52441 CYSTO INSJ TRNSPRSTC 1 IMPLT: CPT | Mod: ,,, | Performed by: UROLOGY

## 2024-05-09 PROCEDURE — 27200651 HC AIRWAY, LMA: Performed by: ANESTHESIOLOGY

## 2024-05-09 PROCEDURE — 94799 UNLISTED PULMONARY SVC/PX: CPT

## 2024-05-09 PROCEDURE — C1769 GUIDE WIRE: HCPCS | Performed by: UROLOGY

## 2024-05-09 PROCEDURE — C1729 CATH, DRAINAGE: HCPCS | Performed by: UROLOGY

## 2024-05-09 PROCEDURE — 25000003 PHARM REV CODE 250: Performed by: ANESTHESIOLOGY

## 2024-05-09 PROCEDURE — 71000039 HC RECOVERY, EACH ADD'L HOUR: Performed by: UROLOGY

## 2024-05-09 PROCEDURE — 27201423 OPTIME MED/SURG SUP & DEVICES STERILE SUPPLY: Performed by: UROLOGY

## 2024-05-09 PROCEDURE — 63600175 PHARM REV CODE 636 W HCPCS: Performed by: UROLOGY

## 2024-05-09 PROCEDURE — 36000707: Performed by: UROLOGY

## 2024-05-09 PROCEDURE — 99900031 HC PATIENT EDUCATION (STAT)

## 2024-05-09 DEVICE — CARTRIDGE UROLIFT 2 IMPLANT: Type: IMPLANTABLE DEVICE | Site: PROSTATE | Status: FUNCTIONAL

## 2024-05-09 RX ORDER — PHENYLEPHRINE HYDROCHLORIDE 10 MG/ML
INJECTION INTRAVENOUS
Status: DISCONTINUED | OUTPATIENT
Start: 2024-05-09 | End: 2024-05-09

## 2024-05-09 RX ORDER — MIDAZOLAM HYDROCHLORIDE 1 MG/ML
INJECTION INTRAMUSCULAR; INTRAVENOUS
Status: DISCONTINUED | OUTPATIENT
Start: 2024-05-09 | End: 2024-05-09

## 2024-05-09 RX ORDER — ONDANSETRON HYDROCHLORIDE 2 MG/ML
4 INJECTION, SOLUTION INTRAVENOUS ONCE
Status: ACTIVE | OUTPATIENT
Start: 2024-05-09

## 2024-05-09 RX ORDER — PROCHLORPERAZINE EDISYLATE 5 MG/ML
5 INJECTION INTRAMUSCULAR; INTRAVENOUS EVERY 4 HOURS PRN
Status: ACTIVE | OUTPATIENT
Start: 2024-05-09

## 2024-05-09 RX ORDER — FENTANYL CITRATE 50 UG/ML
25 INJECTION, SOLUTION INTRAMUSCULAR; INTRAVENOUS EVERY 5 MIN PRN
Status: ACTIVE | OUTPATIENT
Start: 2024-05-09

## 2024-05-09 RX ORDER — PHENAZOPYRIDINE HYDROCHLORIDE 200 MG/1
200 TABLET, FILM COATED ORAL 3 TIMES DAILY PRN
Qty: 15 TABLET | Refills: 0 | Status: SHIPPED | OUTPATIENT
Start: 2024-05-09 | End: 2024-06-06

## 2024-05-09 RX ORDER — LIDOCAINE HYDROCHLORIDE 10 MG/ML
0.5 INJECTION, SOLUTION EPIDURAL; INFILTRATION; INTRACAUDAL; PERINEURAL ONCE
Status: ACTIVE | OUTPATIENT
Start: 2024-05-09

## 2024-05-09 RX ORDER — DEXAMETHASONE SODIUM PHOSPHATE 4 MG/ML
INJECTION, SOLUTION INTRA-ARTICULAR; INTRALESIONAL; INTRAMUSCULAR; INTRAVENOUS; SOFT TISSUE
Status: DISCONTINUED | OUTPATIENT
Start: 2024-05-09 | End: 2024-05-09

## 2024-05-09 RX ORDER — ACETAMINOPHEN 10 MG/ML
INJECTION, SOLUTION INTRAVENOUS
Status: DISCONTINUED | OUTPATIENT
Start: 2024-05-09 | End: 2024-05-09

## 2024-05-09 RX ORDER — LIDOCAINE HYDROCHLORIDE 20 MG/ML
INJECTION INTRAVENOUS
Status: DISCONTINUED | OUTPATIENT
Start: 2024-05-09 | End: 2024-05-09

## 2024-05-09 RX ORDER — PROPOFOL 10 MG/ML
VIAL (ML) INTRAVENOUS
Status: DISCONTINUED | OUTPATIENT
Start: 2024-05-09 | End: 2024-05-09

## 2024-05-09 RX ORDER — HYDROMORPHONE HYDROCHLORIDE 2 MG/ML
0.2 INJECTION, SOLUTION INTRAMUSCULAR; INTRAVENOUS; SUBCUTANEOUS EVERY 5 MIN PRN
Status: ACTIVE | OUTPATIENT
Start: 2024-05-09

## 2024-05-09 RX ORDER — FENTANYL CITRATE 50 UG/ML
INJECTION, SOLUTION INTRAMUSCULAR; INTRAVENOUS
Status: DISCONTINUED | OUTPATIENT
Start: 2024-05-09 | End: 2024-05-09

## 2024-05-09 RX ORDER — DIPHENHYDRAMINE HYDROCHLORIDE 50 MG/ML
25 INJECTION INTRAMUSCULAR; INTRAVENOUS EVERY 6 HOURS PRN
Status: ACTIVE | OUTPATIENT
Start: 2024-05-09

## 2024-05-09 RX ORDER — MEPERIDINE HYDROCHLORIDE 50 MG/ML
12.5 INJECTION INTRAMUSCULAR; INTRAVENOUS; SUBCUTANEOUS ONCE
Status: ACTIVE | OUTPATIENT
Start: 2024-05-09 | End: 2024-05-10

## 2024-05-09 RX ORDER — ONDANSETRON HYDROCHLORIDE 2 MG/ML
INJECTION, SOLUTION INTRAVENOUS
Status: DISCONTINUED | OUTPATIENT
Start: 2024-05-09 | End: 2024-05-09

## 2024-05-09 RX ORDER — OXYCODONE HYDROCHLORIDE 5 MG/1
5 TABLET ORAL
Status: ACTIVE | OUTPATIENT
Start: 2024-05-09

## 2024-05-09 RX ADMIN — PHENYLEPHRINE HYDROCHLORIDE 100 MCG: 10 INJECTION INTRAVENOUS at 11:05

## 2024-05-09 RX ADMIN — LIDOCAINE HYDROCHLORIDE 75 MG: 20 INJECTION, SOLUTION INTRAVENOUS at 10:05

## 2024-05-09 RX ADMIN — SODIUM CHLORIDE, SODIUM GLUCONATE, SODIUM ACETATE, POTASSIUM CHLORIDE AND MAGNESIUM CHLORIDE: 526; 502; 368; 37; 30 INJECTION, SOLUTION INTRAVENOUS at 12:05

## 2024-05-09 RX ADMIN — CEFAZOLIN 2 G: 2 INJECTION, POWDER, FOR SOLUTION INTRAMUSCULAR; INTRAVENOUS at 11:05

## 2024-05-09 RX ADMIN — ACETAMINOPHEN 1000 MG: 10 INJECTION, SOLUTION INTRAVENOUS at 11:05

## 2024-05-09 RX ADMIN — PROPOFOL 160 MG: 10 INJECTION, EMULSION INTRAVENOUS at 10:05

## 2024-05-09 RX ADMIN — SODIUM CHLORIDE, SODIUM GLUCONATE, SODIUM ACETATE, POTASSIUM CHLORIDE AND MAGNESIUM CHLORIDE: 526; 502; 368; 37; 30 INJECTION, SOLUTION INTRAVENOUS at 10:05

## 2024-05-09 RX ADMIN — FENTANYL CITRATE 25 MCG: 50 INJECTION, SOLUTION INTRAMUSCULAR; INTRAVENOUS at 11:05

## 2024-05-09 RX ADMIN — MIDAZOLAM HYDROCHLORIDE 2 MG: 1 INJECTION INTRAMUSCULAR; INTRAVENOUS at 10:05

## 2024-05-09 RX ADMIN — FENTANYL CITRATE 50 MCG: 50 INJECTION, SOLUTION INTRAMUSCULAR; INTRAVENOUS at 10:05

## 2024-05-09 RX ADMIN — DEXAMETHASONE SODIUM PHOSPHATE 4 MG: 4 INJECTION, SOLUTION INTRA-ARTICULAR; INTRALESIONAL; INTRAMUSCULAR; INTRAVENOUS; SOFT TISSUE at 11:05

## 2024-05-09 RX ADMIN — GLYCOPYRROLATE 0.2 MG: 0.2 INJECTION, SOLUTION INTRAMUSCULAR; INTRAVITREAL at 11:05

## 2024-05-09 RX ADMIN — ONDANSETRON 8 MG: 2 INJECTION INTRAMUSCULAR; INTRAVENOUS at 11:05

## 2024-05-09 NOTE — ANESTHESIA PROCEDURE NOTES
Intubation    Date/Time: 5/9/2024 11:00 AM    Performed by: Yumiko Gotti CRNA  Authorized by: Hugo Robin MD    Intubation:     Induction:  Intravenous    Intubated:  Postinduction    Mask Ventilation:  Not attempted    Attempts:  1    Attempted By:  CRNA    Difficult Airway Encountered?: No      Complications:  None    Airway Device:  Supraglottic airway/LMA    Airway Device Size:  4.0    Style/Cuff Inflation:  Cuffed (inflated to minimal occlusive pressure)    Secured at:  The lips    Placement Verified By:  Capnometry    Complicating Factors:  Obesity    Findings Post-Intubation:  Atraumatic/condition of teeth unchanged and BS equal bilateral

## 2024-05-09 NOTE — ANESTHESIA POSTPROCEDURE EVALUATION
Anesthesia Post Evaluation    Patient: John Martini    Procedure(s) Performed: Procedure(s) (LRB):  CYSTOSCOPY, WITH INSERTION OF UROLIFT IMPLANT (N/A)  CYSTOSCOPY, WITH URETHRAL DILATION (N/A)    Final Anesthesia Type: general      Patient location during evaluation: PACU  Patient participation: Yes- Able to Participate  Level of consciousness: awake and alert  Post-procedure vital signs: reviewed and stable  Pain management: adequate  Airway patency: patent    PONV status at discharge: No PONV  Anesthetic complications: no      Cardiovascular status: hemodynamically stable  Respiratory status: unassisted and room air  Hydration status: euvolemic  Follow-up not needed.              Vitals Value Taken Time   /76 05/09/24 1318   Temp 36.5 °C (97.7 °F) 05/09/24 1208   Pulse 60 05/09/24 1318   Resp 11 05/09/24 1318   SpO2 95 % 05/09/24 1318   Vitals shown include unfiled device data.      No case tracking events are documented in the log.      Pain/Beck Score: Beck Score: 10 (5/9/2024  1:10 PM)

## 2024-05-09 NOTE — PLAN OF CARE
Pre-op complete. Wife at bedside. Text notifications initiated. Pt denies need for safe. Belongings in postop.

## 2024-05-09 NOTE — PLAN OF CARE
Discharge instructions given to pt and family/friend, verbalized understanding and questions answered. Handouts provided. Urolift card given to pt Instructed on leg bag and mortensen bag for nighttime use  . Pt prefers to wear mortensen bag home Belongings given back to pt. IV removed- catheter intact. Discharge via wheelchair.

## 2024-05-09 NOTE — H&P
[facility] Urology New Patient/H&P:      John Martini is a 73 y.o. male who presents for evaluation of pneumaturia at referral of Dr Lawson     02/29/24 saw Dr Lawson: Presents today with complaints of passing gas through his penis for the last 3 days. Patient has never had this happen before. There is no history of diverticulosis coli prostate disease colitis etc. In fact on further questioning he mentioned that he had a colonoscopy in the last 2 years by Dr. Vides and he was told that he would never have to have another one done. There has been no passing of stool through the penis as of now. referred to gastroenterology and urology.  Regarding his glycemic control is markedly improved as before and is down to 26 units subcutaneously of the T[place]iba now. Has not been experiencing the hypoglycemic symptomatology that he was noticing before when we first placed the constant glucose monitor on. quit smoking about 28 years ago after 40 yrs of smoking  Also has PMHx HTN, hypothyroid, DM  1/30/24 HbA1C is 8.3 (down from 8.5 in 10/23)     He presents today noting  Has had sensation where at the end of urinating, feels like there is some air blowing out.  This was happening consistently for a few days prior to primary care evaluation as above.  Now it is about once a day.  Seems to be worse after he is active working outside or getting off of the lawnmower.  Felt like had bladder infection when it first started. Had burning with urination. Next day air started.   Reports has had UTIs before, never saw urologist, no known cultures or treatments. Though did have episode when was hospitalized for dehydration about 2 yrs ago and thinks was then was infected and may or may not have seen urologist     On chart review 5/13/22 to [place] [facility] by ems with weak.dizzy near LOC Labs obtained emergency part include a glucose 197, benign chemistry, lactic acid 3 with a repeat of 1.4, negative cardiac profiles, WBCs 13.1, hemoglobin  12.6, hematocrit 39.3, UA reveals a glucose of greater than 1000 mg/dL with positive nitrates and 10-15 WBCs and many bacteria with no epithelial cells seen. Rapid COVID test negative. Portable chest x-ray reveals no acute cardiopulmonary findings. Patient was treated emergency department 1 g Maxipime and [facility]  - admitted and treated for acute cystitis, essential hypertension, type 2 diabetes mellitus, lactic acidosis, GERD, and primary hypothyroidism. Patient was treated with IV fluids, IV antibiotics, and his home medications. Labs have improved. Vital signs are stable. No fever last 24 hours. Pt reports feeling well overall and is improved clinically. Chart reviewed. John Martini is being discharged home today 5/15/2022. Instructions provided for patient to continue home medication regimen with the addition of Augmentin.   - Ua nit+ cloudy 10-15 wbc, many >20 bacteria  >100k cfu klebsiella pneumoniae pansensitive  Then had admit to [facility] 5/20/22 and UA on arrival protein, 4+ gluc, 2+ ketones, 2+ blood - micro with 28rbc, no sig pyuria or bacteria and did not reflex to culture  DCed 5/15 from [place] with generalized weakness before admission, but presented to [facility] with continued with physical deconditioning afterwards, discharged on augmentin, He now has diarrhea by report, T 100.7, nausea  5/21/22 CT on that admit NONcon: Kidneys are normal size. There is bilateral nonspecific perinephric fat stranding. There is no hydronephrosis or nephrolithiasis. Hypoattenuating structure at the inferior pole of the left kidney measuring approximately 2.2 cm in diameter noted. The ureters are normal caliber without obstructions. The bladder is fluid-filled with no focal wall abnormalities. Coarse calcifications of the central prostate gland noted. No free fluid in the pelvis.   - fu MRI w/wo contrast noted Multiple simple left renal cysts, with no enhancing solid renal mass.      Drinks  nothing but grape crystal light sugar free all day - 1 gallon - doesn't like plain water  When he found out that his A1c was still a 8.3 earlier in the year, has been utilizing glucose monitor and changing diet, and feels that has been helping and now based on his average fasting blood sugars the monitor as telling him his A1c is closer to 6.2  Does have some issues with memory  Has had some obstructive lower urinary tract symptoms for a few years, and has been on Flomax 0.4 mg nightly for what he believes to be at least 3 years  No constipation  AUA symptom score:  20/4 (4: Emptying, intermittency, urgency, weak stream; 3: Frequency; 1: Sleeping) Flomax helps 6/10.  Urinalysis dipstick today has greater than a 1000 glucose, trace blood, 30 protein, positive nitrites, trace leukocytes.  PVR is 6 cc by bladder scan  Has had kidney stones before. Many many years. 1995. Quit drinking tea  PSA 0.25 in 2018, and 0.19 in 2021 on chart review  11/29/22 Cr 1, eGFR >60     Personal review of CT scan from 2022 has a 4.8 x 3.6 cm prostate with mild intravesical extension and minimal effacement of the base of the bladder with coarse calcifications as noted in report.          Past Medical History:   Diagnosis Date    Allergy      Diabetes mellitus, type 2      GERD (gastroesophageal reflux disease)      Hyperlipidemia      Hypertension      Hyperthyroidism      Personal history of colonic polyps                 Past Surgical History:   Procedure Laterality Date    COLONOSCOPY        GASTRIC BYPASS N/A                 Family History   Problem Relation Age of Onset    Hyperlipidemia Mother      Stroke Mother      Heart disease Father           Social History           Socioeconomic History    Marital status:    Tobacco Use    Smoking status: Former    Smokeless tobacco: Never              Review of patient's allergies indicates:   Allergen Reactions    Glipizide Hives         Medications Reviewed: see MAR     Focused  "Physical Exam         Vitals:     03/12/24 0916   BP: (!) 147/73   Pulse: 61      Body mass index is 30.43 kg/m². Weight: 107.5 kg (237 lb) Height: 6' 2" (188 cm)         Abdomen: Soft, non-tender, nondistended, no CVA tenderness  Rrr  Ctab    10 pt ros neg except as noted     LABS:     Recent Results         Recent Results (from the past 336 hour(s))   POCT Bladder Scan     Collection Time: 03/12/24  9:18 AM   Result Value Ref Range     POC Residual Urine Volume 6 0 - 100 mL   POCT Urinalysis(Instrument)     Collection Time: 03/12/24  9:25 AM   Result Value Ref Range     Color, POC UA Yellow Yellow, Straw, Colorless     Clarity, POC UA Cloudy (A) Clear     Glucose, POC UA >=1000 (A) Negative     Bilirubin, POC UA Negative Negative     Ketones, POC UA Negative Negative     Spec Grav POC UA 1.015 1.005 - 1.030     Blood, POC UA Trace-intact (A) Negative     pH, POC UA 6.0 5.0 - 8.0     Protein, POC UA 30 (A) Negative     Urobilinogen, POC UA 0.2 <=1.0     Nitrite, POC UA Positive (A) Negative     WBC, POC UA Trace (A) Negative               Assessment/Diagnosis:     1. Pneumaturia  POCT Bladder Scan     POCT Urinalysis(Instrument)     Creatinine, Serum     CT Urogram Abd Pelvis W WO       2. Acute cystitis without hematuria  Urine culture     Urinalysis     Urine Culture High Risk     Procedure Order to Urology       3. BPH without obstruction/lower urinary tract symptoms  Procedure Order to Urology     Creatinine, Serum     CT Urogram Abd Pelvis W WO             Plans:  Extensive review of referral provided by primary care in regards to concern for new hematuria, as well as extensive chart and medical record review of prior labs imaging and hospitalizations including for weakness dehydration urinary tract infection in 2022 as summarized above.  The patient does report feeling symptomatic with burning with urination before this symptom complex started, and his urinalysis today appears grossly infected and nitrite " positive.  No urine was checked at initial primary care evaluation prior to referral for this concern, and while we did discuss that concern for air in voiding can be suspicious for fistula which may have been the suspicion of PCP in regards to discussion of evaluation by primary care and GI, he has not seen GI and did have colonoscopy in the last 2 years which was negative.  We did review that urinary tract infections can have gas forming bacteria, and there can be emphysematous cystitis, and this may be responsible for the sensation that he is experiencing especially with a history of urinary tract infection and urine that looks actively infected at this time.     He does have severe obstructive lower urinary tract symptoms which have been poorly managed with alpha-blocker, Flomax 0.4 mg nightly for the last few years, and we did discuss that prostate obstruction with infection is an indication to relieve obstruction, and recurrent urinary tract infection is also an indication for lower urinary tract workup as well as upper urinary tract workup as is micro hematuria, which was still present on his 2nd hospitalization after treatment for UTI, and today is difficult to assess in the setting of active infection with nitrite positive urine.     After extensive review of all of the above natural history of BPH with obstruction, review of CT scan and prior urine cultures with pansensitive Klebsiella, and urinalysis dipstick today consistent with infection, I recommended a 10 day treatment course for complicated UTI, likely chronic for the last few weeks since symptoms started, to clear his acute infection.  Will send a urine culture to verify species and sensitivity.  He will need lower tract evaluation with cystoscopy and prostate ultrasound to help guide further recommendations, with CT urogram prior.  Would want to wait on upper tract imaging until clearance of infection.  As well, as chronic inflammation from  infection can yield cystoscopic findings in the bladder, would want to place on a low-dose suppressive antibiotic from clearing his infection until time of cystoscopy and delay this by a few weeks to allow any inflammation from this infectious episode to clear.     Certainly any fistula or other source of air or perceived aortic gas in the urine is not ruled out, however CT urogram and cystoscopy will also help further assess this.  Certainly if there are any distinct concerns, can get GI involved as well.     Will hold on alpha-blocker increase at this time, as he is emptying with a PVR of 6 cc, and focused on conservative recommendations for urgency and frequency which were provided.    Ucx >100k klebsiella  Can confirm UTI was still present. Likely reason for his symptoms  So complete full dose bactrim 800 2x/day, then start 400 daily suppression next day and follow up as scheduled for repeat imaging, urine, procedures etc.     Can advise repeat urine negative  Ct with no concerning upper tract findings  Calcification in prostate/bladder will be evaluated with cysto/prostate us as planned on 4/9  Continue low dose daily abx and f/u as planned for procedures    Findings:   TRUS volume: 26.96cm3 (W:  38.82 mm, H:  27.03 mm, L:  49.13 mm), no ELLIS, mild PVR  Cysto:     Mild concentric narrowing of anterior urethra and tissue blanching consistent with lichen sclerosis, but able to pass through concentric rings without any resistance, 1 more prominent but wide caliber mid bulbar urethra, and 1 a bit more narrow appearing near level of sphincter, but easily passable with cystoscope without resistance.     Prostatic lateral lobe obstruction with mild elevation of bladder neck, and more anterolateral obstruction proximally.    Bladder diffusely trabeculated, with punctate cystitis cystica lesions diffusely across bladder mucosa, and on impression of prostate at bladder base on retroflexion, with no median lobe or  intravesical extension.    Disposition:  He does have severe obstructive lower urinary tract symptoms ( AUA symptom score 20/4 including 4-5 emptying, intermittency, urgency weak stream) despite alpha blockers,   has had recurrent urinary tract infection despite alpha blockers, including a history of urosepsis.  We did discuss that in the setting of obstruction and infection, there has an indication to relieve the obstruction.  Certainly if this was his 1st infection, and he had never been on alpha blockers, and cleared it was felt to be doing well, would be reasonable to follow and only intervene in further infection, however given his history, recent presentation, and cystoscopic findings, now with recurrent urinary tract infections and complications of them in the past with visualize obstruction, recommended relief of obstruction. Discussed options for intervention such as urolift, rezum, tuip, turp. He is interested in minimally invasive intervention with minimal destruction of prostate tissue to minimize side effects   Of treatment.  As well, in discussing any transurethral procedure, including TURIP given small size of gland,  we did review Marshallese size of scopes, noting that significant Marshallese size of resectoscope places increase risk to urethra given the lichen sclerosis and narrowed segments unlikely early urethral stricture.  UroLift scope is also the narrowest and smallest, with shortness procedure time unless manipulation to decrease risk to his urethra of further urethral stricture scarring going forward.  When balancing all of the risks to him, especially given his urethral findings, he elected to proceed with UroLift  for relief of obstruction.     Procedure in detail discussed. Discussed risks including but not limited to hematuria, postop retention, pain, infection, injury to bladder or urethra, and worsening urgency, latent pelvic pain, no guarantee of symptomatic relief, prostate regrowth, need for  future procedures. We did discuss the non-incidence of ejaculatory dysfunction, as well as the 13% recurrence rate at 5 years. Also discussed about 20% of people may need a catheter after (due to retention or hematuria) but not required if voiding postop, though tend to leave mortensen prophylactically overnight, which he is agreeable to.  Perhaps longer if any formal stricture dilation is needed  - Certainly we did review that in the setting of his lichen sclerosis and concentric urethral narrowings, as long as the 20 [place] UroLift scope was able to pass without resistance or formal manipulation to the urethra, routine postop protocols as below, however we discussed the possibility of urethral dilation, in which case Mortensen catheter would need to be left indwelling a bit longer.     Discussed that symptomatic relief may take longer to be fully appreciated, in the setting of longer standing obstruction, and period of symptomatic adjustment postop. Also discussed transient increases in urgency frequency which may yield transient UUI in postop period.  -   As well recommended, given the noted urgency frequency increases after relief of longstanding outlet obstruction, his cystitis cystica and bladder irritation can be suppressed a bit more before proceeding as this may help facilitate recovery.  He has started a suppressive course of antibiotic therapy already and has been on it for a few weeks and typically we recommend approximately 90 day course to suppress cystitis cystica, and therefore recommended intervention around the shelter point so there is more time of suppressive antibiotic therapy, and he can continue it in the postoperative period.  -  if these lesions are still present in the bladder at time of BPH intervention, even though classic appearance of cystitis cystica, can biopsy for confirmation     All questions patient had answered and appropriate informed consent obtained.  Urolift (possible dilation, poss  bladder bx) in OR 5/9/24  - continue low dose daily abx and flomax pending this    Ucx 3/28 and 4/30 neg

## 2024-05-09 NOTE — TRANSFER OF CARE
"Anesthesia Transfer of Care Note    Patient: John Martini    Procedure(s) Performed: Procedure(s) (LRB):  CYSTOSCOPY, WITH INSERTION OF UROLIFT IMPLANT (N/A)  CYSTOSCOPY, WITH URETHRAL DILATION (N/A)    Patient location: PACU    Anesthesia Type: general    Transport from OR: Transported from OR on 2-3 L/min O2 by NC with adequate spontaneous ventilation    Post pain: adequate analgesia    Post assessment: no apparent anesthetic complications    Post vital signs: stable    Level of consciousness: sedated    Nausea/Vomiting: no nausea/vomiting    Complications: none    Transfer of care protocol was followed      Last vitals: Visit Vitals  /69 (BP Location: Right arm, Patient Position: Lying)   Pulse (!) 58   Temp 36.6 °C (97.9 °F) (Skin)   Resp 18   Ht 6' 2" (1.88 m)   Wt 107 kg (236 lb)   SpO2 97%   BMI 30.30 kg/m²     "

## 2024-05-09 NOTE — OP NOTE
[facility] Urology Operative/Brief Discharge Note    Date: 05/09/2024    Staff Surgeon: Alfonso Rivas MD    Pre-Op Diagnosis:   BPH with obstruction/LUTS   Urethral strictures/lichen sclerosis urethra                  Post-Op Diagnosis: same     Procedure(s) Performed:   Urolift: cystoscopy with prostatic urethral lift/transprostatic tissue retraction (39716) - modifier 22 - with placement of 5 total implants (52442 x4), as well as serial dilation of urethral strictures      Specimen(s):  none     Anesthesia: General LMA anesthesia     Findings:    Anterolateral lobe ingrowth of prostate with obstruction and elevation of bladder neck.  Stacked anterior standard 4 implants, 2 per side with some continued moderate right anterior proximal obstruction for which a 5th implant was placed.  - though before able to do UroLift, his previously appreciated lichen sclerosis with concentric urethral narrowings was found more distinctly to be a mid to proximal bulbous urethral stricture, passable with UroLift scope with mild resistance and some tissue shearing of this area, but up to a point of impassable urethral stricture just distal to sphincter with verumontanum in view through it - given proximity to sphincter, serial dilation over DVIU was chosen, and guidewire utilized with serial urethral dilation of strictures from 16-22 [place] over guidewire before passing UroLift scope and performing UroLift, thus increasing operative time and complexity justifying modifier 22  - Minimal anterior bleeding fulgurated      Estimated Blood Loss: minimal     Drains: 22 fr  [place] mortensen       Complications: none     Indications for procedure:   73 yo M with long hx BPH/LUTS, recently   Referred for concerns of pneumaturia, but on further evaluation and discussion, suspected untreated urinary tract infection, especially given his history of urosepsis admission 2 years ago, and urine culture did confirm Klebsiella urinary tract infection for  which he treated culture specific antibiotics, and has been on low-dose daily suppressive Bactrim pending further evaluation, with repeat negative culture and CT urogram without concerns.  Symptoms, including pneumaturia, did resolve, and he remained on Flomax which he was on prior to this episode, with lower tract findings revealing a 27 g prostate with anterolateral obstruction proximally and elevation of bladder neck and diffusely trabeculated bladder as well as mild concentric narrowing of the anterior urethra and tissue blanching consistent with lichen sclerosis but able to pass flexible cystoscope through concentric rings without any resistance and there was 1 more prominent but wide caliber mid bulbous urethra and 1 more narrow appearing area at the level of the sphincter able to pass with the flexible cystoscope.  After discussion of management of his obstruction in the setting of past infection which he was able to clear, he elected to proceed with UroLift, especially given minimally invasive nature to minimize treatment side effects, as well as smallest aperture scope given his stricture disease, understanding that it may be necessary to perform urethral dilation or DVIU of urethral stricture in order to pass scope and appropriately performed BPH procedure, of which he was informed this would yield length of catheterization beyond standard. All risks and benefits discussed and appropriate informed consent obtained.     Procedure in detail:  After appropriate informed consent was obtained, the patient was taken to the cystoscopy suite in the operating room.  Preoperative antibiotics were given and a WHO proved timeout was performed.      20fr UroLift scope was passed per urethra and confirmed previous cystoscopic findings noted above.  Able to traverse through the more prominent initially encountered stricture with some mild tissue shearing and minimal resistance, however could not pass the more proximal  stricture at the level of the sphincter as above, and so the UroLift scope was exchanged for a 21 [place]ian rigid cystoscope which, prior to the initial stricture, a superstiff guidewire was passed until it was confirmed within the bladder, as the scope was able to passively dilate and go over this guidewire a bit.  However given the stricture significance, and initial inability to pass the more proximal stricture, the guidewire was left indwelling, the scope was off-loaded, and serial dilation of his multiple urethral strictures was performed from 16-22 Ethiopian initially using the blue S curve urethral dilators, however at 18 and 20 started to spin within the urethra given multiple points of narrowing, so we exchanged for [place] coude tip followers from 18-22 Ethiopian, after which alongside the guidewire, the UroLift scope was able to pass.  The guidewire was removed.     Cystoscopy bridge was then replaced with a urolift delivery device.  The first treatment site was at level of verumontanum in anterior channel given short prostatic urethra The distal tip of the delivery device was then angled laterally, approximately 10° at this position, to compress the lateral lobe. The trigger was pulled to deploy a needle containing the implant through the capsule of the prostate, and additional 10°. The needle was then retracted allowing the implants to be delivered to the capsular surface of the prostate which was then tensioned to a short capsular tensioning and removal of the slack monofilament.  The device was then angled back towards midline and slowly advanced proximally about 3-4 mm until cystoscopic verification that the monofilament was centered in the delivery bay of the device.  Excess filament was then severed with suture cut maneuver of device. The delivery device was then readvanced into the bladder, and leaving the cystoscopic sheath in place, and implant cartridge removed and replaced with a second Urolift  implant cartrdige, for which contralateral implant placed in similar fashion.       After the initial 2 implants were placed, seen to have good retraction left distal and right proximal as implants were slightly different levels, and so an additional 2 implants per side were placed in a stacked fashion, a bit more anterior and proximal on the right, as well as on the left.   The view with visual obturator after the standard 4 implants placed in his stacked fashion yielded some continued distal obstruction on right for which a 5th implant was placed and did relieve this.  Bladder neck then remained open with flow of irrigation off. There was mild anterior prostatic urethral bleeding and near bladder neck.     The Urolift scope was then exchanged for a rigid cystoscope for which Bugbee monopolar cautery was also used then spot prostatic urethral fulguration of the anterior prostatic urethra bleeding.  Superstiff guidewire was replaced through the scope sheath when hemostatic, and over it a 22 [place] tip Mortensen catheter with 30cc in 10 cc balloon was placed and irrigated with 60 cc catheter tip syringe noting urine to be clear, and then placed to traction on thigh with mastasol and silk tape.  He tolerated procedure well without complication and was awakened and taken to PACU without incident.       Disposition:   He will be observed in PACU to ensure urine remains reasonably clear off traction with ballon back at 10cc and does not require irrigation as he hydrates and meets pacu criteria, then will be discharged home. Prophylactic postoperative antibiotics, and urinary analgesic/anti-spasmodic to help control any postoperative irritative or inflammatory symptoms.  Will remove mortensen at one week betty given concurrent stricture management and will return for nurse visit on 5/17/24 for Mortensen removal , and He will return in 1 month for reeval with NP with repeat symptom assessment and measurement of postvoid  residual.    Discharge home today status post uncomplicated procedure as above  Diet - resume home diet  Follow up: 5/17 mortensen removal  1 month NP  Instructions:  Avoid strenuous activity until 3-5 days AFTER mortensen removed  No riding mowers, bicycles, motorcycles, tractors, or sexual activity for 2-3 weeks  No fish oil/aspirin/Miranda or supplements x 5 days  Continue flomax for 1 week after mortensen removed then STOP  OTC meds for discomfort such as ibuprofen, tylenol OK  Pink/clear red (koolaid) urine ok as long as clear/translucent without dark blood or clots, and urinating well/draining well without difficulty - drink lots of water.  WATER WATER WATER!!  May see blood drip around mortensen, is ok and normal within first few days  Avoid constipation, pushing/straining with BM. Use stool softener if needed  Will have increased urgency and frequency after removing catheter so avoid/minimize bladder irritants (coffee, soda, tea, alcohol)  *MUST increase water  As well see all recs for urgency ferquency below and especially follow timed voiding during day to facilitate emptying  Use pyridium/phenazopyridine once mortensen removed if burning persists after mortensen removal (will turn urine orange)  Complete antibiotics   ** TAKE two pills in am AND 2 pills in pm x5 days before resuming once daily until Rx runs out  (Has at home bactriim 400 tabs - so will take 800 twice daily x5 days -- then resume daily)  While catheter is in, use leg bag when ambulatory and large bag at night.   Lubricate tip of penis around catheter as needed with plain KY or plain vaseline to avoid sticking  Catheter will move with you so may appear to be going in/out but it is not and the clip on the leg helps keep it from pulling tension.  Ok to shower with catheter in place. All catheter and bags can get wet. Towel dry. Use soap and water gently at end of penis and around catheter to keep clean. No soaks/baths until mortensen removed.   Urolift implants are mri  "safe - will get safety card - likewise will not set off any metal detectors, including airport  Meds:     Medication List        START taking these medications      phenazopyridine 200 MG tablet  Commonly known as: PYRIDIUM  Take 1 tablet (200 mg total) by mouth 3 (three) times daily as needed (dysuria).            CONTINUE taking these medications      ALLEGRA ALLERGY 180 MG tablet  Generic drug: fexofenadine     amLODIPine 5 MG tablet  Commonly known as: NORVASC  TAKE 1 TABLET DAILY     atorvastatin 40 MG tablet  Commonly known as: LIPITOR  TAKE 1 TABLET AT BEDTIME     blood sugar diagnostic Strp  Commonly known as: ONETOUCH VERIO TEST STRIPS  USE 1 STRIP VIA METER TWICE A DAY AS DIRECTED     gabapentin 300 MG capsule  Commonly known as: NEURONTIN  TAKE 1 CAPSULE IN THE MORNING AND 2 CAPSULES AT BEDTIME     GLYXAMBI 25-5 mg Tab  Generic drug: empagliflozin-linagliptin  TAKE 1 TABLET EVERY MORNING     insulin degludec 200 unit/mL (3 mL) insulin pen  Commonly known as: TRESIBA FLEXTOUCH U-200  Inject 40 units subcutaneously every night     irbesartan 150 MG tablet  Commonly known as: AVAPRO  TAKE 1 TABLET DAILY     * levothyroxine 137 MCG Tab tablet  Commonly known as: SYNTHROID  TAKE 2 TABLETS EVERY SUNDAY ON AN EMPTY STOMACH     loratadine 10 mg tablet  Commonly known as: CLARITIN     metFORMIN 500 MG ER 24hr tablet  Commonly known as: GLUCOPHAGE-XR  TAKE 3 TABLETS EVERY EVENING     MULTIPLE VITAMINS DAILY ORAL     pantoprazole 40 MG tablet  Commonly known as: PROTONIX  TAKE 1 TABLET DAILY     pen needle, diabetic 31 gauge x 5/16" Ndle  Commonly known as: BD ULTRA-FINE SHORT PEN NEEDLE  Use to administer Tresiba every night     * repaglinide 1 MG tablet  Commonly known as: PRANDIN  Take 1 tablet (1 mg total) by mouth 3 (three) times daily before meals.     * repaglinide 2 MG tablet  Commonly known as: PRANDIN     sulfamethoxazole-trimethoprim 400-80mg 400-80 mg per tablet  Commonly known as: BACTRIM,SEPTRA  Take 1 " tablet by mouth once daily.     tamsulosin 0.4 mg Cap  Commonly known as: FLOMAX  TAKE 1 CAPSULE AT BEDTIME           * This list has 3 medication(s) that are the same as other medications prescribed for you. Read the directions carefully, and ask your doctor or other care provider to review them with you.                ASK your doctor about these medications      * levothyroxine 50 MCG tablet  Commonly known as: SYNTHROID  TAKE 1 TABLET EVERY MORNING ONE HOUR BEFORE A MEAL AND PRIOR TO OTHER MEDICATIONS           * This list has 1 medication(s) that are the same as other medications prescribed for you. Read the directions carefully, and ask your doctor or other care provider to review them with you.                   Where to Get Your Medications        These medications were sent to [facility]/pharmacy #[id number] - [place], [place] - [address] AT [facility]  [address], [place] [address]      Phone: [phone]   phenazopyridine 200 MG tablet

## 2024-05-09 NOTE — ANESTHESIA PREPROCEDURE EVALUATION
05/09/2024  John Martini is a 74 y.o., male.      Pre-op Assessment    I have reviewed the Patient Summary Reports.     I have reviewed the Nursing Notes. I have reviewed the NPO Status.   I have reviewed the Medications.     Review of Systems  Anesthesia Hx:  No problems with previous Anesthesia                Social:  Former Smoker       Cardiovascular:     Hypertension           hyperlipidemia                       Hypertension         Pulmonary:        Sleep Apnea     Obstructive Sleep Apnea (DENISA).           Renal/:  Chronic Renal Disease        Kidney Function/Disease             Hepatic/GI:     GERD      Gerd          Endocrine:  Diabetes Hypothyroidism Hyperthyroidism  Diabetes                Hyperthyroidism   Hypothyroidism        Obesity / BMI > 30      Physical Exam  General: Well nourished, Cooperative, Alert and Oriented    Airway:  Mallampati: II   Mouth Opening: Normal  TM Distance: Normal  Tongue: Normal    Dental:  Dentures    Chest/Lungs:  Normal Respiratory Rate    Heart:  Rate: Normal        Anesthesia Plan  Type of Anesthesia, risks & benefits discussed:    Anesthesia Type: Gen Supraglottic Airway  Intra-op Monitoring Plan: Standard ASA Monitors  Post Op Pain Control Plan: IV/PO Opioids PRN and multimodal analgesia  Induction:  IV  Informed Consent: Informed consent signed with the Patient and all parties understand the risks and agree with anesthesia plan.  All questions answered.   ASA Score: 3    Ready For Surgery From Anesthesia Perspective.     .

## 2024-05-10 VITALS
OXYGEN SATURATION: 98 % | RESPIRATION RATE: 16 BRPM | HEIGHT: 74 IN | WEIGHT: 236 LBS | SYSTOLIC BLOOD PRESSURE: 156 MMHG | DIASTOLIC BLOOD PRESSURE: 77 MMHG | HEART RATE: 65 BPM | TEMPERATURE: 98 F | BODY MASS INDEX: 30.29 KG/M2

## 2024-05-13 ENCOUNTER — NURSE TRIAGE (OUTPATIENT)
Dept: ADMINISTRATIVE | Facility: CLINIC | Age: 74
End: 2024-05-13
Payer: MEDICARE

## 2024-05-13 NOTE — TELEPHONE ENCOUNTER
Spoke w pt regarding recent urolift on 5/9  Per pt having burning with urination/passed tissue   Urine clear/had some blood this am but cleared   Pt declines blood clots/declines constipation   Pt agreed that he has been hydrating   Informed pt of the below   Pt vu   Reminded of appt on Friday    ______________________________________________________            Discharge instructions     Discharge home today status post uncomplicated procedure as above  Diet - resume home diet  Follow up: 5/17 mortensen removal  1 month NP  Instructions:  Avoid strenuous activity until 3-5 days AFTER mortensne removed  No riding mowers, bicycles, motorcycles, tractors, or sexual activity for 2-3 weeks  No fish oil/aspirin/Miranda or supplements x 5 days  Continue flomax for 1 week after mortensen removed then STOP  OTC meds for discomfort such as ibuprofen, tylenol OK  Pink/clear red (koolaid) urine ok as long as clear/translucent without dark blood or clots, and urinating well/draining well without difficulty - drink lots of water.  WATER WATER WATER!!  May see blood drip around mortensen, is ok and normal within first few days  Avoid constipation, pushing/straining with BM. Use stool softener if needed  Will have increased urgency and frequency after removing catheter so avoid/minimize bladder irritants (coffee, soda, tea, alcohol)  *MUST increase water  As well see all recs for urgency ferquency below and especially follow timed voiding during day to facilitate emptying  Use pyridium/phenazopyridine once mortensen removed if burning persists after mortensen removal (will turn urine orange)  Complete antibiotics   ** TAKE two pills in am AND 2 pills in pm x5 days before resuming once daily until Rx runs out  (Has at home bactriim 400 tabs - so will take 800 twice daily x5 days -- then resume daily)  While catheter is in, use leg bag when ambulatory and large bag at night.   Lubricate tip of penis around catheter as needed with plain KY or plain vaseline to  avoid sticking  Catheter will move with you so may appear to be going in/out but it is not and the clip on the leg helps keep it from pulling tension.  Ok to shower with catheter in place. All catheter and bags can get wet. Towel dry. Use soap and water gently at end of penis and around catheter to keep clean. No soaks/baths until mortensen removed.   Urolift implants are mri safe - will get safety card - likewise will not set off any metal detectors, including a

## 2024-05-13 NOTE — TELEPHONE ENCOUNTER
Pt calling with c/o having burning and pt still has the catheter that was placed after surgery. Pt said that he also looks like he passed a piece of FLESH. Or tissue. Pt triaged and care advice to go to office now and told him I can't make an appt and unsure if MD is in office or not today but if he doesn't hear from them in an hour or so to go to the . Pt said that he can wait till tomorrow but told that the care advice id to be seen in office today. Pt is awaiting a call from the office as he doesn't want to go to the                       Reason for Disposition   Bleeding around catheter (e.g., from penis or female urethra)    Additional Information   Negative: Shock suspected (e.g., cold/pale/clammy skin, too weak to stand, low BP, rapid pulse)   Negative: Sounds like a life-threatening emergency to the triager   Negative: SEVERE abdominal pain   Negative: Catheter was accidentally pulled-out and bright red continuous bleeding   Negative: Fever > 100.4 F (38.0 C)   Negative: Drinking very little and dehydration suspected (e.g., no urine > 12 hours, very dry mouth, very lightheaded)   Negative: Patient sounds very sick or weak to the triager   Negative: Catheter was accidentally pulled-out   Negative: Catheter is broken and is not working (does not function normally)    Protocols used: Urinary Catheter (e.g., [place]) Symptoms and [id number]

## 2024-05-17 ENCOUNTER — CLINICAL SUPPORT (OUTPATIENT)
Dept: UROLOGY | Facility: CLINIC | Age: 74
End: 2024-05-17
Payer: MEDICARE

## 2024-05-17 DIAGNOSIS — N40.0 BPH WITHOUT OBSTRUCTION/LOWER URINARY TRACT SYMPTOMS: Primary | ICD-10-CM

## 2024-05-17 PROCEDURE — 99499 UNLISTED E&M SERVICE: CPT | Mod: S$GLB,,, | Performed by: UROLOGY

## 2024-05-17 NOTE — PROGRESS NOTES
Patient here today to have his catheter removed?  2 patient identifiers verified  10?ml saline removed from catheter balloon.   Catheter removed.   Catheter bag contains?75ml?yellow clear?urine.  ??  Patient left the office in satisfactory condition.

## 2024-06-06 ENCOUNTER — OFFICE VISIT (OUTPATIENT)
Dept: UROLOGY | Facility: CLINIC | Age: 74
End: 2024-06-06
Payer: MEDICARE

## 2024-06-06 DIAGNOSIS — N40.0 BPH WITHOUT OBSTRUCTION/LOWER URINARY TRACT SYMPTOMS: Primary | ICD-10-CM

## 2024-06-06 LAB — POC RESIDUAL URINE VOLUME: 36 ML (ref 0–100)

## 2024-06-06 PROCEDURE — 99999 PR PBB SHADOW E&M-EST. PATIENT-LVL IV: CPT | Mod: PBBFAC,,, | Performed by: NURSE PRACTITIONER

## 2024-06-06 PROCEDURE — 1159F MED LIST DOCD IN RCRD: CPT | Mod: CPTII,S$GLB,, | Performed by: NURSE PRACTITIONER

## 2024-06-06 PROCEDURE — 99214 OFFICE O/P EST MOD 30 MIN: CPT | Mod: S$GLB,,, | Performed by: NURSE PRACTITIONER

## 2024-06-06 PROCEDURE — 1160F RVW MEDS BY RX/DR IN RCRD: CPT | Mod: CPTII,S$GLB,, | Performed by: NURSE PRACTITIONER

## 2024-06-06 PROCEDURE — 4010F ACE/ARB THERAPY RXD/TAKEN: CPT | Mod: CPTII,S$GLB,, | Performed by: NURSE PRACTITIONER

## 2024-06-06 PROCEDURE — 51798 US URINE CAPACITY MEASURE: CPT | Mod: S$GLB,,, | Performed by: NURSE PRACTITIONER

## 2024-06-06 PROCEDURE — 3288F FALL RISK ASSESSMENT DOCD: CPT | Mod: CPTII,S$GLB,, | Performed by: NURSE PRACTITIONER

## 2024-06-06 PROCEDURE — 1101F PT FALLS ASSESS-DOCD LE1/YR: CPT | Mod: CPTII,S$GLB,, | Performed by: NURSE PRACTITIONER

## 2024-06-06 RX ORDER — OXYBUTYNIN CHLORIDE 5 MG/1
5 TABLET, EXTENDED RELEASE ORAL DAILY
Qty: 30 TABLET | Refills: 11 | Status: SHIPPED | OUTPATIENT
Start: 2024-06-06 | End: 2025-06-06

## 2024-06-06 NOTE — PROGRESS NOTES
[facility] Note  Staff: JOSÉ MANUEL Salamanca-ERICKA    PCP: MD Renny  Urologist:  MD Evelyn    Chief Complaint: BPH s/p Urolift procedure    Subjective:        HPI: John Martini is a 74 y.o. male presents today for BPH s/p Urolift f/up.     HX:  75 yo M with long hx BPH/LUTS, recently   Referred for concerns of pneumaturia, but on further evaluation and discussion, suspected untreated urinary tract infection, especially given his history of urosepsis admission 2 years ago, and urine culture did confirm Klebsiella urinary tract infection for which he treated culture specific antibiotics, and has been on low-dose daily suppressive Bactrim pending further evaluation, with repeat negative culture and CT urogram without concerns.  Symptoms, including pneumaturia, did resolve, and he remained on Flomax which he was on prior to this episode, with lower tract findings revealing a 27 g prostate with anterolateral obstruction proximally and elevation of bladder neck and diffusely trabeculated bladder as well as mild concentric narrowing of the anterior urethra and tissue blanching consistent with lichen sclerosis but able to pass flexible cystoscope through concentric rings without any resistance and there was 1 more prominent but wide caliber mid bulbous urethra and 1 more narrow appearing area at the level of the sphincter able to pass with the flexible cystoscope.  After discussion of management of his obstruction in the setting of past infection which he was able to clear, he elected to proceed with UroLif     Procedure(s) Performed by Dr. Rivas on 5/9/24:  Urolift: cystoscopy with prostatic urethral lift/transprostatic tissue retraction (90898) - modifier 22 - with placement of 5 total implants (52442 x4), as well as serial dilation of urethral strictures      Specimen(s):  none     Anesthesia: General LMA anesthesia     Findings:    Anterolateral lobe ingrowth of prostate with obstruction  and elevation of bladder neck.  Stacked anterior standard 4 implants, 2 per side with some continued moderate right anterior proximal obstruction for which a 5th implant was placed.  - though before able to do UroLift, his previously appreciated lichen sclerosis with concentric urethral narrowings was found more distinctly to be a mid to proximal bulbous urethral stricture, passable with UroLift scope with mild resistance and some tissue shearing of this area, but up to a point of impassable urethral stricture just distal to sphincter with verumontanum in view through it - given proximity to sphincter, serial dilation over DVIU was chosen, and guidewire utilized with serial urethral dilation of strictures from 16-22 Urdu over guidewire before passing UroLift scope and performing UroLift, thus increasing operative time and complexity justifying modifier 22  - Minimal anterior bleeding fulgurated    OV 24:  UA deferred by pt-Pt urinated downstairs upon arrival.  Denies gross hematuria and dysuria today.  +C/O Increased urinary urgency and frequency with urination.  PVR by bladder scan is 36 mL  Overall pt states doing well since Urolift procedure now 3-4 weeks out.  Just minimal OAB symptoms.  He is now off all BPH meds since procedure.    AUA SS Today:  15/3  Feeling of ICBE:  2  Frequency:4  Intermittency:1  Urgency:4  Weak urine stream:1  Strainin  Nocturia:3    REVIEW OF SYSTEMS:  A comprehensive 10 system review was performed and is negative except as noted above in HPI    PMHx:  Past Medical History:   Diagnosis Date    Allergy     Diabetes mellitus, type 2     GERD (gastroesophageal reflux disease)     Hyperlipidemia     Hypertension     Hyperthyroidism     Personal history of colonic polyps     Renal disorder     KIDNEY STONES     PSHx:  Past Surgical History:   Procedure Laterality Date    COLONOSCOPY      CYSTOSCOPY N/A 2024    Procedure: CYSTOSCOPY;  Surgeon: Alfonso Rivas MD;  Location:  [facility] OR;  Service: Urology;  Laterality: N/A;    CYSTOSCOPY WITH INSERTION OF MINIMALLY INVASIVE IMPLANT TO ENLARGE PROSTATIC URETHRA N/A 5/9/2024    Procedure: CYSTOSCOPY, WITH INSERTION OF UROLIFT IMPLANT;  Surgeon: Alfonso Rivas MD;  Location: [facility] OR;  Service: Urology;  Laterality: N/A;    CYSTOSCOPY WITH URETHRAL DILATION N/A 5/9/2024    Procedure: CYSTOSCOPY, WITH URETHRAL DILATION;  Surgeon: Alfonso Rivas MD;  Location: [facility] OR;  Service: Urology;  Laterality: N/A;    GASTRIC BYPASS N/A     TRANSRECTAL ULTRASOUND EXAMINATION N/A 4/9/2024    Procedure: ULTRASOUND, RECTAL APPROACH;  Surgeon: Alfonso Rivas MD;  Location: [facility] OR;  Service: Urology;  Laterality: N/A;       Allergies:  Glipizide    Medications: reviewed     Objective:   There were no vitals filed for this visit.    General:WDWN in NAD  Eyes: PERRLA, normal conjunctiva  Respiratory: no increased work on breathing, clear to auscultation  Cardiovascular: regular rate and rhythm. No obvious extremity edema.  GI: palpation of masses. No tenderness. No hepatosplenomegaly to palpation.  Musculoskeletal: normal range of motion of bilateral upper extremities. Normal muscle strength and tone.  Skin: no obvious rashes or lesions. No tightening of skin noted.  Neurologic: CN grossly normal. Normal sensation.   Psychiatric: awake, alert and oriented x 3. Mood and affect normal. Cooperative.      Assessment:       1. BPH without obstruction/lower urinary tract symptoms          Plan:     Overall pt has done well from a postop standpoint since procedure.  No s/s of infection, inflammation, trouble urinating at this time.  However will prescribe temporary OAB meds for his current LUTS.  Oxybutynin XL 5 mg one tablet daily prescribed.  The med prescribed to pt today as trial to see if med improves pt's current LUTS.  Benefits, risks and side affects were thoroughly explained to pt today in office with all questions answered.    F/u in  3-4 mos for recheck with UA, PVR, and AUA SS at that time.  Pt verbalized understanding.      Mikayla Law, MARLONC

## 2024-10-06 NOTE — PROGRESS NOTES
[facility] Progress Note    John Martini is a 74 y.o. male who presents for BPH follow up    PMHx HTN, hypothyroid, DM [1/30/24 HbA1C is 8.3 (down from 8.5 in 10/23)], diverticulosis  5/2022 Klebsiella UTI sepsis admit. has been on Flomax 0.4 mg nightly for what he believes to be at least 3 years. No constipation  AUA symptom score:  20/4 (4: Emptying, intermittency, urgency, weak stream; 3: Frequency; 1: Sleeping) Flomax helps 6/10.  Urinalysis dipstick today has greater than a 1000 glucose, trace blood, 30 protein, positive nitrites, trace leukocytes.  PVR is 6 cc by bladder scan  Has had kidney stones before. Many many years. 1995. Quit drinking tea, PSA 0.25 in 2018, and 0.19 in 2021 on chart review, 11/29/22 Cr 1, eGFR >60  Personal review of CT scan from 2022 has a 4.8 x 3.6 cm prostate with mild intravesical extension and minimal effacement of the base of the bladder with coarse calcifications as noted in report.    Long hx BPH/LUTS, recently   Referred for concerns of pneumaturia, but on further evaluation and discussion, suspected untreated urinary tract infection, especially given his history of urosepsis admission 2 years ago, and urine culture did confirm Klebsiella urinary tract infection for which he treated culture specific antibiotics, and has been on low-dose daily suppressive Bactrim pending further evaluation, with repeat negative culture and CT urogram without concerns.     Cysto/TRUS 4/9/24  TRUS volume: 26.96cm3 (W:  38.82 mm, H:  27.03 mm, L:  49.13 mm), no ELLIS, mild PVR  Cysto:   Mild concentric narrowing of anterior urethra and tissue blanching consistent with lichen sclerosis, but able to pass through concentric rings without any resistance, 1 more prominent but wide caliber mid bulbar urethra, and 1 a bit more narrow appearing near level of sphincter, but easily passable with cystoscope without resistance.     Prostatic lateral lobe obstruction with mild elevation of bladder  neck, and more anterolateral obstruction proximally.    Bladder diffusely trabeculated, with punctate cystitis cystica lesions diffusely across bladder mucosa, and on impression of prostate at bladder base on retroflexion, with no median lobe or intravesical extension.    24 Urolift with urethral stricture dilation  Anterolateral lobe ingrowth of prostate with obstruction and elevation of bladder neck.  Stacked anterior standard 4 implants, 2 per side with some continued moderate right anterior proximal obstruction for which a 5th implant was placed.  - though before able to do UroLift, his previously appreciated lichen sclerosis with concentric urethral narrowings was found more distinctly to be a mid to proximal bulbous urethral stricture, passable with UroLift scope with mild resistance and some tissue shearing of this area, but up to a point of impassable urethral stricture just distal to sphincter with verumontanum in view through it - given proximity to sphincter, serial dilation over DVIU was chosen, and guidewire utilized with serial urethral dilation of strictures from 16-22 Colombian over guidewire before passing UroLift scope and performing UroLift    24 mortensen removed    24 NP f/u:   Denies gross hematuria and dysuria today. +C/O Increased urinary urgency and frequency with urination. PVR by bladder scan is 36 mL  Overall pt states doing well since Urolift procedure now 3-4 weeks out. Just minimal OAB symptoms. He is now off all BPH meds since procedure.  AUA SS Today:  15/3 (emptyin; Frequency:4; Intermittency:1; Urgency:4; Weak urine stream:1; Strainin; Nocturia:3)  - will prescribe temporary OAB meds for his current LUTS. Oxybutynin XL 5 mg one tablet daily prescribed. F/u in 3-4 mos for recheck with UA, PVR, and AUA SS at that time.    He returns today noting  AUA symptom score:  5/1, pleased (1: Emptying, frequency, intermittency, weak stream, sleeping)  Urinalysis dipstick greater  "than a 1000 glucose, small leukocytes.  PVR 26 cc.  Has been on oxybutynin since last visit.  No constipation or significant dry eyes dry mouth but has had some trouble with memory which was prior to oxybutynin use  Stream and flow good.   No hematurita dysuria pneumaturia      Focused Physical Exam:    Body mass index is 30.29 kg/m². Weight: 107 kg (235 lb 14.3 oz) Height: 6' 2" (188 cm)     Abdomen: Soft, non-tender, nondistended, no CVA tenderness    Recent Results (from the past 2 weeks)   POCT Urinalysis(Instrument)    Collection Time: 10/07/24 10:32 AM   Result Value Ref Range    Color, POC UA Yellow Yellow, Straw, Colorless    Clarity, POC UA Clear Clear    Glucose, POC UA >=1000 (A) Negative    Bilirubin, POC UA Negative Negative    Ketones, POC UA Negative Negative    Spec Grav POC UA 1.015 1.005 - 1.030    Blood, POC UA Negative Negative    pH, POC UA 6.0 5.0 - 8.0    Protein, POC UA 30 (A) Negative    Urobilinogen, POC UA 0.2 <=1.0    Nitrite, POC UA Negative Negative    WBC, POC UA Small (A) Negative   POCT Bladder Scan    Collection Time: 10/07/24 10:32 AM   Result Value Ref Range    POC Residual Urine Volume 26 0 - 100 mL       ASSESSMENT   1. BPH without obstruction/lower urinary tract symptoms  POCT Urinalysis(Instrument)    POCT Bladder Scan      2. Other stricture of bulbous urethra in male            Plan    Overall doing well 4 months status post UroLift with concurrent serial urethral stricture dilation.  He did experience some increases in urgency and frequency around the one-month postop timeline, and again discuss this is expected in the face of relief of longstanding obstruction, however was started on overactive bladder medication with anticholinergic.  He does report resolution of his urgency and frequency with this.  However also positive that more time away from the procedure would allow this to decrease his bladder adjust.  Given baseline trouble with memory anticholinergic at his age " is not a safer choice as a beta agonist, but at this time rather than change medical management I would prefer that he stopped the Ditropan and follow conservative recommendations for urgency and frequency as otherwise he is unobstructed with no obstructive symptoms to make sure that he was good in the absence of medical therapy.  If there are significant increases in urgency and frequency again in the absence of it, should then let us know and we could consider a beta agonist in the interim.  Otherwise will set close follow-up in 6 months with nurse practitioner just to re-evaluate LUTS and emptying.  Certainly while there is a recurrence risk with minimally invasive BPH intervention, should he experience recurrent symptoms or worsening symptoms in the future this may also be due to urethral stricture disease and could assess with uroflow for strictured pattern etcetera cystoscopic evaluation.  Stopped Ditropan.  Conservative recommendations for urgency frequency provided in writing on after visit summary.  RTC 6 months.  Notify us in a few weeks if urgency and frequency significantly worsened in the absence of medical management to prescribe new medicine  Udip with wbc but no red/nits, no signs/sxs infetcion  Will follow urines at visits    Level of Medical Decision Making  [ _ ]  Low: 2 minor/1 stable chronic/1 acute uncomplicated --- review record/result/order test x2  [ X ]  Mod: 1 chronic exac/2stable chronic/1 acute comp --- review record/result/order test x3 OR independent interp of outside test OR discuss mgmt of outside ordered test --- start drug therapy/plan minor surgery   [ _ ]  High: chronic with exacerbation/progression or trtmnt side effect vs acute/chronic w/ life/body threat ---  (2/3) review record/result/order test x3 OR independent interp of ouutside test OR discuss mgmt of outside ordered test --- drug with monitoring for toxicity, plan major surgery, hospitalize, deescalate/withdraw care bc  of prognosis    *Visit today included increased complexity associated with the current or anticipated ongoing medical longitudinal care and management related to this patient's serious and/or complex managed problem(s) as described above.

## 2024-10-07 ENCOUNTER — OFFICE VISIT (OUTPATIENT)
Dept: UROLOGY | Facility: CLINIC | Age: 74
End: 2024-10-07
Payer: MEDICARE

## 2024-10-07 VITALS — WEIGHT: 235.88 LBS | HEIGHT: 74 IN | BODY MASS INDEX: 30.27 KG/M2

## 2024-10-07 DIAGNOSIS — N35.812 OTHER STRICTURE OF BULBOUS URETHRA IN MALE: ICD-10-CM

## 2024-10-07 DIAGNOSIS — N40.0 BPH WITHOUT OBSTRUCTION/LOWER URINARY TRACT SYMPTOMS: Primary | ICD-10-CM

## 2024-10-07 LAB
BILIRUBIN, UA POC OHS: NEGATIVE
BLOOD, UA POC OHS: NEGATIVE
CLARITY, UA POC OHS: CLEAR
COLOR, UA POC OHS: YELLOW
GLUCOSE, UA POC OHS: >=1000
KETONES, UA POC OHS: NEGATIVE
LEUKOCYTES, UA POC OHS: ABNORMAL
NITRITE, UA POC OHS: NEGATIVE
PH, UA POC OHS: 6
POC RESIDUAL URINE VOLUME: 26 ML (ref 0–100)
PROTEIN, UA POC OHS: 30
SPECIFIC GRAVITY, UA POC OHS: 1.01
UROBILINOGEN, UA POC OHS: 0.2

## 2024-10-07 PROCEDURE — 99214 OFFICE O/P EST MOD 30 MIN: CPT | Mod: 25,S$GLB,, | Performed by: UROLOGY

## 2024-10-07 PROCEDURE — 1101F PT FALLS ASSESS-DOCD LE1/YR: CPT | Mod: CPTII,S$GLB,, | Performed by: UROLOGY

## 2024-10-07 PROCEDURE — 3288F FALL RISK ASSESSMENT DOCD: CPT | Mod: CPTII,S$GLB,, | Performed by: UROLOGY

## 2024-10-07 PROCEDURE — 51798 US URINE CAPACITY MEASURE: CPT | Mod: S$GLB,,, | Performed by: UROLOGY

## 2024-10-07 PROCEDURE — 3008F BODY MASS INDEX DOCD: CPT | Mod: CPTII,S$GLB,, | Performed by: UROLOGY

## 2024-10-07 PROCEDURE — 81003 URINALYSIS AUTO W/O SCOPE: CPT | Mod: QW,S$GLB,, | Performed by: UROLOGY

## 2024-10-07 PROCEDURE — 99999 PR PBB SHADOW E&M-EST. PATIENT-LVL III: CPT | Mod: PBBFAC,,, | Performed by: UROLOGY

## 2024-10-07 PROCEDURE — 1126F AMNT PAIN NOTED NONE PRSNT: CPT | Mod: CPTII,S$GLB,, | Performed by: UROLOGY

## 2024-10-07 PROCEDURE — 1159F MED LIST DOCD IN RCRD: CPT | Mod: CPTII,S$GLB,, | Performed by: UROLOGY

## 2024-10-07 PROCEDURE — 4010F ACE/ARB THERAPY RXD/TAKEN: CPT | Mod: CPTII,S$GLB,, | Performed by: UROLOGY

## 2025-04-03 ENCOUNTER — OFFICE VISIT (OUTPATIENT)
Dept: URGENT CARE | Facility: CLINIC | Age: 75
End: 2025-04-03
Payer: MEDICARE

## 2025-04-03 VITALS
OXYGEN SATURATION: 96 % | WEIGHT: 235 LBS | TEMPERATURE: 98 F | HEART RATE: 53 BPM | DIASTOLIC BLOOD PRESSURE: 82 MMHG | SYSTOLIC BLOOD PRESSURE: 152 MMHG | HEIGHT: 74 IN | BODY MASS INDEX: 30.16 KG/M2 | RESPIRATION RATE: 19 BRPM

## 2025-04-03 DIAGNOSIS — M67.431 GANGLION CYST OF DORSUM OF RIGHT WRIST: Primary | ICD-10-CM

## 2025-04-03 NOTE — PROGRESS NOTES
"Subjective:      Patient ID: John Martini is a 74 y.o. male.    Vitals:  height is 6' 2" (1.88 m) and weight is 106.6 kg (235 lb). His temperature is 97.9 °F (36.6 °C). His blood pressure is 152/82 (abnormal) and his pulse is 53 (abnormal). His respiration is 19 and oxygen saturation is 96%.     Chief Complaint: Cyst    Patient is a 73 yo male who presents to clinic today for evaluation of knot to wrist for about a week. Pt states it is starting to get painful.        Cyst  This is a new problem. The current episode started 1 to 4 weeks ago. The problem occurs constantly. The problem has been unchanged. Associated symptoms include joint swelling (right wrist). Pertinent negatives include no abdominal pain, chest pain, chills, congestion, coughing, diaphoresis, fatigue, fever, headaches, myalgias, nausea, rash, sore throat or vomiting.       Constitution: Negative for chills, sweating, fatigue and fever.   HENT:  Negative for ear pain, congestion and sore throat.    Neck: neck negative.   Cardiovascular: Negative.  Negative for chest pain and palpitations.   Eyes: Negative.    Respiratory: Negative.  Negative for chest tightness, cough, shortness of breath and wheezing.    Gastrointestinal: Negative.  Negative for abdominal pain, nausea, vomiting and diarrhea.   Endocrine: negative.   Genitourinary: Negative.    Musculoskeletal:  Positive for joint swelling (right wrist). Negative for muscle ache.   Skin: Negative.  Negative for color change, pale, rash and erythema.   Allergic/Immunologic: Negative.    Neurological: Negative.  Negative for dizziness, light-headedness, passing out, headaches, disorientation and altered mental status.   Hematologic/Lymphatic: Negative.    Psychiatric/Behavioral: Negative.  Negative for altered mental status, disorientation and confusion.       Objective:     Physical Exam   Constitutional: He is oriented to person, place, and time. He appears well-developed. He is cooperative. "  Non-toxic appearance. He does not appear ill. No distress.   HENT:   Head: Normocephalic and atraumatic.   Ears:   Right Ear: Hearing, tympanic membrane, external ear and ear canal normal.   Left Ear: Hearing, tympanic membrane, external ear and ear canal normal.   Nose: Nose normal. No mucosal edema, rhinorrhea, nasal deformity or congestion. No epistaxis. Right sinus exhibits no maxillary sinus tenderness and no frontal sinus tenderness. Left sinus exhibits no maxillary sinus tenderness and no frontal sinus tenderness.   Mouth/Throat: Uvula is midline, oropharynx is clear and moist and mucous membranes are normal. Mucous membranes are moist. No trismus in the jaw. Normal dentition. No uvula swelling. No oropharyngeal exudate or posterior oropharyngeal erythema. Oropharynx is clear.   Eyes: Conjunctivae and lids are normal. Pupils are equal, round, and reactive to light. Right eye exhibits no discharge. Left eye exhibits no discharge. No scleral icterus.   Neck: Trachea normal and phonation normal. Neck supple. No neck rigidity present.   Cardiovascular: Normal rate, regular rhythm, normal heart sounds and normal pulses.   Pulmonary/Chest: Effort normal and breath sounds normal. No respiratory distress. He has no wheezes. He has no rhonchi. He has no rales.   Abdominal: Normal appearance and bowel sounds are normal. He exhibits no distension. Soft. There is no abdominal tenderness.   Musculoskeletal: Normal range of motion.         General: Normal range of motion.      Right wrist: He exhibits deformity (ganglion cyst).      Left wrist: Normal.      Cervical back: He exhibits no tenderness.   Lymphadenopathy:     He has no cervical adenopathy.   Neurological: He is alert and oriented to person, place, and time. He exhibits normal muscle tone.   Skin: Skin is warm, dry, intact, not diaphoretic, not pale and no rash. Capillary refill takes less than 2 seconds. No erythema   Psychiatric: His speech is normal and  behavior is normal. Judgment and thought content normal.   Nursing note and vitals reviewed.      Assessment:     1. Ganglion cyst of dorsum of right wrist        Plan:       Ganglion cyst of dorsum of right wrist  -     Ambulatory referral/consult to General Surgery                  Referral to general surgery   RTC PRN

## 2025-04-07 ENCOUNTER — OFFICE VISIT (OUTPATIENT)
Dept: UROLOGY | Facility: CLINIC | Age: 75
End: 2025-04-07
Payer: MEDICARE

## 2025-04-07 VITALS — HEIGHT: 73 IN | BODY MASS INDEX: 30.48 KG/M2 | WEIGHT: 230 LBS

## 2025-04-07 DIAGNOSIS — N40.0 BPH WITHOUT OBSTRUCTION/LOWER URINARY TRACT SYMPTOMS: Primary | ICD-10-CM

## 2025-04-07 LAB
BILIRUBIN, UA POC OHS: NEGATIVE
BLOOD, UA POC OHS: ABNORMAL
CLARITY, UA POC OHS: CLEAR
COLOR, UA POC OHS: YELLOW
GLUCOSE, UA POC OHS: >=1000
KETONES, UA POC OHS: NEGATIVE
LEUKOCYTES, UA POC OHS: ABNORMAL
NITRITE, UA POC OHS: POSITIVE
PH, UA POC OHS: 6
POC RESIDUAL URINE VOLUME: 4 ML (ref 0–100)
PROTEIN, UA POC OHS: 30
SPECIFIC GRAVITY, UA POC OHS: 1.01
UROBILINOGEN, UA POC OHS: 0.2

## 2025-04-07 PROCEDURE — 99999 PR PBB SHADOW E&M-EST. PATIENT-LVL IV: CPT | Mod: PBBFAC,,, | Performed by: NURSE PRACTITIONER

## 2025-04-07 PROCEDURE — 1101F PT FALLS ASSESS-DOCD LE1/YR: CPT | Mod: CPTII,S$GLB,, | Performed by: NURSE PRACTITIONER

## 2025-04-07 PROCEDURE — 51798 US URINE CAPACITY MEASURE: CPT | Mod: S$GLB,,, | Performed by: NURSE PRACTITIONER

## 2025-04-07 PROCEDURE — 1159F MED LIST DOCD IN RCRD: CPT | Mod: CPTII,S$GLB,, | Performed by: NURSE PRACTITIONER

## 2025-04-07 PROCEDURE — 87186 SC STD MICRODIL/AGAR DIL: CPT | Performed by: NURSE PRACTITIONER

## 2025-04-07 PROCEDURE — 1160F RVW MEDS BY RX/DR IN RCRD: CPT | Mod: CPTII,S$GLB,, | Performed by: NURSE PRACTITIONER

## 2025-04-07 PROCEDURE — 81003 URINALYSIS AUTO W/O SCOPE: CPT | Mod: QW,S$GLB,, | Performed by: NURSE PRACTITIONER

## 2025-04-07 PROCEDURE — 4010F ACE/ARB THERAPY RXD/TAKEN: CPT | Mod: CPTII,S$GLB,, | Performed by: NURSE PRACTITIONER

## 2025-04-07 PROCEDURE — 3288F FALL RISK ASSESSMENT DOCD: CPT | Mod: CPTII,S$GLB,, | Performed by: NURSE PRACTITIONER

## 2025-04-07 PROCEDURE — 3008F BODY MASS INDEX DOCD: CPT | Mod: CPTII,S$GLB,, | Performed by: NURSE PRACTITIONER

## 2025-04-07 PROCEDURE — 99214 OFFICE O/P EST MOD 30 MIN: CPT | Mod: 25,S$GLB,, | Performed by: NURSE PRACTITIONER

## 2025-04-07 NOTE — PROGRESS NOTES
[facility] Note  Staff: VIDA Salamanca    PCP: ALINE Freeman    Chief Complaint: Follow-up--BPH    Subjective:        HPI: John Martini is a 74 y.o. male who presents for BPH follow up  Pt is a former pt of Dr. Alfonso Rivas; Last OV on 10/7/2024.     HX:  PMHx HTN, hypothyroid, DM [1/30/24 HbA1C is 8.3 (down from 8.5 in 10/23)], diverticulosis  5/2022 Klebsiella UTI sepsis admit. has been on Flomax 0.4 mg nightly for what he believes to be at least 3 years. No constipation  AUA symptom score:  20/4 (4: Emptying, intermittency, urgency, weak stream; 3: Frequency; 1: Sleeping) Flomax helps 6/10.  Urinalysis dipstick today has greater than a 1000 glucose, trace blood, 30 protein, positive nitrites, trace leukocytes.  PVR is 6 cc by bladder scan  Has had kidney stones before. Many many years. 1995. Quit drinking tea, PSA 0.25 in 2018, and 0.19 in 2021 on chart review, 11/29/22 Cr 1, eGFR >60  Personal review of CT scan from 2022 has a 4.8 x 3.6 cm prostate with mild intravesical extension and minimal effacement of the base of the bladder with coarse calcifications as noted in report.     Long hx BPH/LUTS, recently   Referred for concerns of pneumaturia, but on further evaluation and discussion, suspected untreated urinary tract infection, especially given his history of urosepsis admission 2 years ago, and urine culture did confirm Klebsiella urinary tract infection for which he treated culture specific antibiotics, and has been on low-dose daily suppressive Bactrim pending further evaluation, with repeat negative culture and CT urogram without concerns.      Cysto/TRUS 4/9/24  TRUS volume: 26.96cm3 (W:  38.82 mm, H:  27.03 mm, L:  49.13 mm), no ELLIS, mild PVR  Cysto:   Mild concentric narrowing of anterior urethra and tissue blanching consistent with lichen sclerosis, but able to pass through concentric rings without any resistance, 1 more prominent but wide caliber mid  bulbar urethra, and 1 a bit more narrow appearing near level of sphincter, but easily passable with cystoscope without resistance.     Prostatic lateral lobe obstruction with mild elevation of bladder neck, and more anterolateral obstruction proximally.    Bladder diffusely trabeculated, with punctate cystitis cystica lesions diffusely across bladder mucosa, and on impression of prostate at bladder base on retroflexion, with no median lobe or intravesical extension.     24 Urolift with urethral stricture dilation  Anterolateral lobe ingrowth of prostate with obstruction and elevation of bladder neck.  Stacked anterior standard 4 implants, 2 per side with some continued moderate right anterior proximal obstruction for which a 5th implant was placed.  - though before able to do UroLift, his previously appreciated lichen sclerosis with concentric urethral narrowings was found more distinctly to be a mid to proximal bulbous urethral stricture, passable with UroLift scope with mild resistance and some tissue shearing of this area, but up to a point of impassable urethral stricture just distal to sphincter with verumontanum in view through it - given proximity to sphincter, serial dilation over DVIU was chosen, and guidewire utilized with serial urethral dilation of strictures from 16-22 Upper sorbian over guidewire before passing UroLift scope and performing UroLift     24 mortensen removed     24 NP f/u:   Denies gross hematuria and dysuria today. +C/O Increased urinary urgency and frequency with urination. PVR by bladder scan is 36 mL  Overall pt states doing well since Urolift procedure now 3-4 weeks out. Just minimal OAB symptoms. He is now off all BPH meds since procedure.  AUA SS Today:  15/3 (emptyin; Frequency:4; Intermittency:1; Urgency:4; Weak urine stream:1; Strainin; Nocturia:3)  - will prescribe temporary OAB meds for his current LUTS. Oxybutynin XL 5 mg one tablet daily prescribed. F/u in 3-4 mos  for recheck with UA, PVR, and AUA SS at that time.    10/7/2024:  He returns today noting  AUA symptom score:  5/1, pleased (1: Emptying, frequency, intermittency, weak stream, sleeping)  Urinalysis dipstick greater than a 1000 glucose, small leukocytes.  PVR 26 cc.  Has been on oxybutynin since last visit.  No constipation or significant dry eyes dry mouth but has had some trouble with memory which was prior to oxybutynin use  Stream and flow good.   No hematurita dysuria pneumaturia    4/7/25:  Pt arrives today for f/up.  BPH s/p Urolift (5/9/24)  UA showed Trace Leuks, +Nitrates, trace-intact blood, >=1000 Glucose.  PVR is 4 mL  AUA SS:  12/2   (5-Frequency;4-intermittency; 2-weak stream; 1-Nocturia)  No dysuria, No gross hematuria noted by pt today.  Overall pt states no new  issues/symptoms at this time.  He is currently not on any  meds.    REVIEW OF SYSTEMS:  A comprehensive 10 system review was performed and is negative except as noted above in HPI    PMHx:  Past Medical History:   Diagnosis Date    Allergy     Diabetes mellitus, type 2     GERD (gastroesophageal reflux disease)     Hyperlipidemia     Hypertension     Hyperthyroidism     Personal history of colonic polyps     Renal disorder     KIDNEY STONES     PSHx:  Past Surgical History:   Procedure Laterality Date    COLONOSCOPY      CYSTOSCOPY N/A 4/9/2024    Procedure: CYSTOSCOPY;  Surgeon: Alfonso Rivas MD;  Location: [facility] OR;  Service: Urology;  Laterality: N/A;    CYSTOSCOPY WITH INSERTION OF MINIMALLY INVASIVE IMPLANT TO ENLARGE PROSTATIC URETHRA N/A 5/9/2024    Procedure: CYSTOSCOPY, WITH INSERTION OF UROLIFT IMPLANT;  Surgeon: Alfonso Rivas MD;  Location: [facility] OR;  Service: Urology;  Laterality: N/A;    CYSTOSCOPY WITH URETHRAL DILATION N/A 5/9/2024    Procedure: CYSTOSCOPY, WITH URETHRAL DILATION;  Surgeon: Alfonso Rivas MD;  Location: [facility] OR;  Service: Urology;  Laterality: N/A;    GASTRIC BYPASS N/A     TRANSRECTAL  ULTRASOUND EXAMINATION N/A 4/9/2024    Procedure: ULTRASOUND, RECTAL APPROACH;  Surgeon: Alfonso Rivas MD;  Location: [facility] OR;  Service: Urology;  Laterality: N/A;       Allergies:  Glipizide    Medications: reviewed     Objective:   There were no vitals filed for this visit.    General:WDWN in NAD  Eyes: PERRLA, normal conjunctiva  Respiratory: no increased work on breathing, clear to auscultation  Cardiovascular: regular rate and rhythm. No obvious extremity edema.  GI: palpation of masses. No tenderness. No hepatosplenomegaly to palpation.  Musculoskeletal: normal range of motion of bilateral upper extremities. Normal muscle strength and tone.  Skin: no obvious rashes or lesions. No tightening of skin noted.  Neurologic: CN grossly normal. Normal sensation.   Psychiatric: awake, alert and oriented x 3. Mood and affect normal. Cooperative.  Assessment:       1. BPH without obstruction/lower urinary tract symptoms          Plan:     Conservative recommendations for urgency frequency reviewed to pt upon conclusion of OV today.  Offered to restart Oxybutynin, pt deferred at this time.   RTC 6 months.     JOSÉ MANUEL Coats-ERICKA  Visit today is associated with current or anticipated ongoing medical care related to this patient's single serious condition/complex condition.

## 2025-04-08 ENCOUNTER — PATIENT MESSAGE (OUTPATIENT)
Dept: SURGERY | Facility: CLINIC | Age: 75
End: 2025-04-08
Payer: MEDICARE

## 2025-04-09 ENCOUNTER — RESULTS FOLLOW-UP (OUTPATIENT)
Dept: UROLOGY | Facility: CLINIC | Age: 75
End: 2025-04-09
Payer: MEDICARE

## 2025-04-09 DIAGNOSIS — M67.40 GANGLION CYST: Primary | ICD-10-CM

## 2025-04-09 LAB — BACTERIA UR CULT: ABNORMAL

## 2025-04-09 RX ORDER — CIPROFLOXACIN 500 MG/1
500 TABLET ORAL 2 TIMES DAILY
Qty: 20 TABLET | Refills: 0 | Status: SHIPPED | OUTPATIENT
Start: 2025-04-09 | End: 2025-04-19

## 2025-04-22 DIAGNOSIS — R22.31 MASS OF RIGHT WRIST: Primary | ICD-10-CM

## 2025-04-25 ENCOUNTER — OFFICE VISIT (OUTPATIENT)
Dept: ORTHOPEDICS | Facility: CLINIC | Age: 75
End: 2025-04-25
Payer: MEDICARE

## 2025-04-25 ENCOUNTER — HOSPITAL ENCOUNTER (OUTPATIENT)
Dept: RADIOLOGY | Facility: HOSPITAL | Age: 75
Discharge: HOME | End: 2025-04-25
Attending: ORTHOPAEDIC SURGERY
Payer: MEDICARE

## 2025-04-25 VITALS — WEIGHT: 230 LBS | HEIGHT: 73 IN | BODY MASS INDEX: 30.48 KG/M2

## 2025-04-25 DIAGNOSIS — M67.431 GANGLION CYST OF DORSUM OF RIGHT WRIST: Primary | ICD-10-CM

## 2025-04-25 DIAGNOSIS — R22.31 MASS OF RIGHT WRIST: ICD-10-CM

## 2025-04-25 PROCEDURE — 73110 X-RAY EXAM OF WRIST: CPT | Mod: 26,RT,, | Performed by: RADIOLOGY

## 2025-04-25 PROCEDURE — 73110 X-RAY EXAM OF WRIST: CPT | Mod: TC,RT

## 2025-04-25 PROCEDURE — 99999 PR PBB SHADOW E&M-EST. PATIENT-LVL III: CPT | Mod: PBBFAC,,, | Performed by: ORTHOPAEDIC SURGERY

## 2025-04-25 RX ORDER — LIDOCAINE HYDROCHLORIDE 10 MG/ML
1 INJECTION, SOLUTION INFILTRATION; PERINEURAL
Status: DISCONTINUED | OUTPATIENT
Start: 2025-04-25 | End: 2025-04-25 | Stop reason: HOSPADM

## 2025-04-25 RX ADMIN — LIDOCAINE HYDROCHLORIDE 1 ML: 10 INJECTION, SOLUTION INFILTRATION; PERINEURAL at 09:04

## 2025-04-25 NOTE — PROGRESS NOTES
Subjective:      Patient ID: John Martini is a 75 y.o. male.    Chief Complaint: Pain of the Right Wrist (Cyst)    HPI  75-year-old male presents with a 1-2 month history of swelling in the dorsum in his right wrist.  Was seen by his PCP diagnosed with a cyst referred for further evaluation.  He is complaining of some pain with pressure on that wrist.  Denies any trauma.  She has had no specific treatment for this.  ROS      Objective:    Ortho Exam       Constitutional:   Patient is alert  and oriented in no acute distress  HEENT:  normocephalic atraumatic; PERRL EOMI  Neck:  Supple without adenopathy  Cardiovascular:  Normal rate and rhythm  Pulmonary:  Normal respiratory effort normal chest wall expansion  Abdominal:  Nonprotuberant nondistended  Musculoskeletal:  Patient has a 1-2 cm dorsal cystic swelling  Nonpulsatile minimal tenderness no erythema  Neurological:  No focal defect; cranial nerves 2-12 grossly intact  Psychiatric/behavioral:  Mood and behavior normal    X-Ray Wrist Complete Right  Narrative: EXAMINATION:  XR WRIST COMPLETE 3 VIEWS RIGHT    CLINICAL HISTORY:  Localized swelling, mass and lump, right upper limb    TECHNIQUE:  PA, lateral, and oblique views of the right wrist were performed.    COMPARISON:  None    FINDINGS:  There is no evidence fracture or malalignment.  There is soft tissue swelling over the dorsal wrist.  Impression: There is soft tissue swelling of the dorsum of the wrist.    Electronically signed by: Alison Mtz MD  Date:    04/25/2025  Time:    09:51       My Radiographs Findings:    Radiographs of the wrist without acute osseous abnormalities.  Assessment:       Encounter Diagnosis   Name Primary?    Ganglion cyst of dorsum of right wrist Yes         Plan:       I have discussed medical condition treatment options him at length.  After a verbal consent and sterile prep and a local anesthetic I aspirated 2 cc of gelatinous cystic fluid.  Placed a compressive  wrap.  I have discussed ice elevation compressive wrapping we have discussed the risk of recurrence and possibility of surgical excision in the future if he so chooses electively.  He may slowly advance activities as tolerated follow up can be as needed.        Past Medical History:   Diagnosis Date    Allergy     Diabetes mellitus, type 2     GERD (gastroesophageal reflux disease)     Hyperlipidemia     Hypertension     Hyperthyroidism     Personal history of colonic polyps     Renal disorder     KIDNEY STONES     Past Surgical History:   Procedure Laterality Date    COLONOSCOPY      CYSTOSCOPY N/A 4/9/2024    Procedure: CYSTOSCOPY;  Surgeon: Alfonso Rivas MD;  Location: [facility] OR;  Service: Urology;  Laterality: N/A;    CYSTOSCOPY WITH INSERTION OF MINIMALLY INVASIVE IMPLANT TO ENLARGE PROSTATIC URETHRA N/A 5/9/2024    Procedure: CYSTOSCOPY, WITH INSERTION OF UROLIFT IMPLANT;  Surgeon: Alfonso Rivas MD;  Location: [facility] OR;  Service: Urology;  Laterality: N/A;    CYSTOSCOPY WITH URETHRAL DILATION N/A 5/9/2024    Procedure: CYSTOSCOPY, WITH URETHRAL DILATION;  Surgeon: Alfonso Rivas MD;  Location: [facility] OR;  Service: Urology;  Laterality: N/A;    GASTRIC BYPASS N/A     TRANSRECTAL ULTRASOUND EXAMINATION N/A 4/9/2024    Procedure: ULTRASOUND, RECTAL APPROACH;  Surgeon: Alfonso Rivas MD;  Location: [facility] OR;  Service: Urology;  Laterality: N/A;       Current Medications[1]    Review of patient's allergies indicates:   Allergen Reactions    Glipizide Hives       Family History   Problem Relation Name Age of Onset    Hyperlipidemia Mother      Stroke Mother      Heart disease Father       Social History     Occupational History    Not on file   Tobacco Use    Smoking status: Former     Types: Cigarettes    Smokeless tobacco: Never   Substance and Sexual Activity    Alcohol use: Never    Drug use: Never    Sexual activity: Not Currently            [1]   Current Outpatient Medications:      "amLODIPine (NORVASC) 5 MG tablet, TAKE 1 TABLET DAILY, Disp: 90 tablet, Rfl: 3    atorvastatin (LIPITOR) 40 MG tablet, TAKE 1 TABLET AT BEDTIME, Disp: 90 tablet, Rfl: 3    blood sugar diagnostic (ONETOUCH VERIO TEST STRIPS) Strp, USE 1 STRIP VIA METER TWICE A DAY AS DIRECTED, Disp: 100 strip, Rfl: 3    fexofenadine (ALLEGRA ALLERGY) 180 MG tablet, Take 1 tablet by mouth once daily., Disp: , Rfl:     gabapentin (NEURONTIN) 300 MG capsule, TAKE 1 CAPSULE IN THE MORNING AND 2 CAPSULES AT BEDTIME, Disp: 270 capsule, Rfl: 3    GLYXAMBI 25-5 mg Tab, TAKE 1 TABLET EVERY MORNING, Disp: 90 tablet, Rfl: 3    insulin degludec (TRESIBA FLEXTOUCH U-200) 200 unit/mL (3 mL) insulin pen, Inject 40 units subcutaneously every night, Disp: 6 pen, Rfl: 6    irbesartan (AVAPRO) 150 MG tablet, TAKE 1 TABLET DAILY, Disp: 90 tablet, Rfl: 3    levothyroxine (SYNTHROID) 137 MCG Tab tablet, TAKE 2 TABLETS EVERY SUNDAY ON AN EMPTY STOMACH, Disp: 24 tablet, Rfl: 3    levothyroxine (SYNTHROID) 50 MCG tablet, TAKE 1 TABLET EVERY MORNING ONE HOUR BEFORE A MEAL AND PRIOR TO OTHER MEDICATIONS, Disp: 90 tablet, Rfl: 3    loratadine (CLARITIN) 10 mg tablet, Take 10 mg by mouth once daily., Disp: , Rfl:     metFORMIN (GLUCOPHAGE-XR) 500 MG ER 24hr tablet, TAKE 3 TABLETS EVERY EVENING, Disp: 270 tablet, Rfl: 3    multivitamin (MULTIPLE VITAMINS DAILY ORAL), Take 1 tablet by mouth once daily., Disp: , Rfl:     pantoprazole (PROTONIX) 40 MG tablet, TAKE 1 TABLET DAILY, Disp: 90 tablet, Rfl: 3    pen needle, diabetic (BD ULTRA-FINE SHORT PEN NEEDLE) 31 gauge x 5/16" Ndle, Use to administer Tresiba every night, Disp: 100 each, Rfl: 3    repaglinide (PRANDIN) 2 MG tablet, Take by mouth., Disp: , Rfl:   No current facility-administered medications for this visit.    Facility-Administered Medications Ordered in Other Visits:     diphenhydrAMINE injection 25 mg, 25 mg, Intravenous, Q6H PRN, Kevin Miller, MD    electrolyte-S (ISOLYTE), , Intravenous, " Continuous, Kevin Miller MD, Stopped at 05/09/24 1415    fentaNYL 50 mcg/mL injection 25 mcg, 25 mcg, Intravenous, Q5 Min PRN, Kevin Miller MD    HYDROmorphone (PF) injection 0.2 mg, 0.2 mg, Intravenous, Q5 Min PRN, Kevin Miller MD    LIDOcaine (PF) 10 mg/ml (1%) injection 5 mg, 0.5 mL, Intradermal, Once, Kevin Miller MD    ondansetron injection 4 mg, 4 mg, Intravenous, Once, Kevin Miller MD    oxyCODONE immediate release tablet 5 mg, 5 mg, Oral, Q3H PRN, Kevin Miller MD    prochlorperazine injection Soln 5 mg, 5 mg, Intravenous, Q4H PRN, Kevin Miller MD

## 2025-04-25 NOTE — PROCEDURES
Ganglion Cyst    Date/Time: 4/25/2025 9:45 AM    Performed by: Jose Newman MD  Authorized by: Jose Newman MD    Location:  Wrist  Needle size:  18 G  Approach:  Dorsal  Medications:  1 mL LIDOcaine HCL 10 mg/ml (1%) 10 mg/mL (1 %)  Aspirate amount (ml):  2  Aspirate:  Clear

## (undated) DEVICE — SOL IRR WATER STRL 3000 ML

## (undated) DEVICE — SYR 50ML CATH TIP

## (undated) DEVICE — Device

## (undated) DEVICE — GLOVE SENSICARE PI ALOE 7

## (undated) DEVICE — BAG DRAIN ANTI REFLUX 2000ML

## (undated) DEVICE — SET CYSTO IRR DRP CHMBR 84IN

## (undated) DEVICE — GUIDE BIOPSY BIPLANAR 18G

## (undated) DEVICE — SYR 10CC LUER LOCK

## (undated) DEVICE — SPONGE COTTON TRAY 4X4IN

## (undated) DEVICE — GOWN POLY REINF BRTH SLV LG

## (undated) DEVICE — KIT UROLIFT 2 CARTRIDGE HANDLE
Type: IMPLANTABLE DEVICE | Site: PROSTATE | Status: NON-FUNCTIONAL
Removed: 2024-05-09

## (undated) DEVICE — SOL NACL IRR 1000ML BTL

## (undated) DEVICE — LEGGING CLEAR POLY 2/PACK

## (undated) DEVICE — SOL NACL IRR 3000ML

## (undated) DEVICE — CATH FOLLOWER HEYMAN 22FR

## (undated) DEVICE — COVER TABLE 44X90 STERILE

## (undated) DEVICE — SPONGE BULKEE II ABSRB 6X6.75

## (undated) DEVICE — SET IRR URLGY 2LINE UNIV SPIKE

## (undated) DEVICE — SCRUB DYNA-HEX LIQ 4% CHG 4OZ

## (undated) DEVICE — GLOVE SENSICARE PI ALOE 6.5

## (undated) DEVICE — GOWN POLY REINF BRTH SLV XL

## (undated) DEVICE — BOWL STERILE LARGE 32OZ

## (undated) DEVICE — SLEEVE SCD EXPRESS KNEE MEDIUM

## (undated) DEVICE — SET DILATOR URTH 8-20FR 37CM

## (undated) DEVICE — DEVICE ANC SW STAT FOLEY 6-24

## (undated) DEVICE — COVER TRANSDUCER LATEX N/STERI

## (undated) DEVICE — LUBRICANT SURGILUBE 2 OZ

## (undated) DEVICE — SOL POVIDONE PREP IODINE 4 OZ

## (undated) DEVICE — DRAPE CYSTOSCOPY LARGE

## (undated) DEVICE — GUIDE WIRE MOTION .035 X 150CM

## (undated) DEVICE — JELLY SURGILUBE LUBE TUBE 2OZ

## (undated) DEVICE — DRAPE UINDERBUT GRAD PCH

## (undated) DEVICE — BAG LINGEMAN DRAIN UROLOGY

## (undated) DEVICE — WIRE GUIDE TFLN CT SPR STFF ST